# Patient Record
Sex: MALE | Employment: FULL TIME | URBAN - METROPOLITAN AREA
[De-identification: names, ages, dates, MRNs, and addresses within clinical notes are randomized per-mention and may not be internally consistent; named-entity substitution may affect disease eponyms.]

---

## 2017-01-12 ENCOUNTER — HOSPITAL ENCOUNTER (OUTPATIENT)
Dept: LAB | Age: 52
Discharge: HOME | End: 2017-01-12
Payer: COMMERCIAL

## 2017-01-12 ENCOUNTER — OFFICE VISIT (OUTPATIENT)
Dept: FAMILY MEDICINE CLINIC | Age: 52
End: 2017-01-12

## 2017-01-12 VITALS
SYSTOLIC BLOOD PRESSURE: 110 MMHG | HEIGHT: 67 IN | RESPIRATION RATE: 16 BRPM | DIASTOLIC BLOOD PRESSURE: 70 MMHG | OXYGEN SATURATION: 95 % | TEMPERATURE: 98.2 F | WEIGHT: 202.4 LBS | BODY MASS INDEX: 31.77 KG/M2 | HEART RATE: 90 BPM

## 2017-01-12 DIAGNOSIS — E11.9 CONTROLLED TYPE 2 DIABETES MELLITUS WITHOUT COMPLICATION, WITHOUT LONG-TERM CURRENT USE OF INSULIN (HCC): Primary | ICD-10-CM

## 2017-01-12 DIAGNOSIS — Z11.59 NEED FOR HEPATITIS C SCREENING TEST: ICD-10-CM

## 2017-01-12 DIAGNOSIS — I10 ESSENTIAL HYPERTENSION WITH GOAL BLOOD PRESSURE LESS THAN 130/80: ICD-10-CM

## 2017-01-12 DIAGNOSIS — Z13.0 SCREENING, ANEMIA, DEFICIENCY, IRON: ICD-10-CM

## 2017-01-12 DIAGNOSIS — Z13.21 ENCOUNTER FOR VITAMIN DEFICIENCY SCREENING: ICD-10-CM

## 2017-01-12 DIAGNOSIS — E55.9 VITAMIN D DEFICIENCY: ICD-10-CM

## 2017-01-12 DIAGNOSIS — Z23 ENCOUNTER FOR IMMUNIZATION: ICD-10-CM

## 2017-01-12 DIAGNOSIS — Z13.29 SCREENING FOR THYROID DISORDER: ICD-10-CM

## 2017-01-12 DIAGNOSIS — E78.00 HYPERCHOLESTEREMIA: ICD-10-CM

## 2017-01-12 DIAGNOSIS — E11.9 CONTROLLED TYPE 2 DIABETES MELLITUS WITHOUT COMPLICATION, WITHOUT LONG-TERM CURRENT USE OF INSULIN (HCC): ICD-10-CM

## 2017-01-12 DIAGNOSIS — E66.9 OBESITY (BMI 30.0-34.9): ICD-10-CM

## 2017-01-12 LAB
ALT SERPL-CCNC: 50 U/L (ref 16–61)
ANION GAP BLD CALC-SCNC: 9 MMOL/L (ref 3–18)
AST SERPL W P-5'-P-CCNC: 18 U/L (ref 15–37)
BUN SERPL-MCNC: 15 MG/DL (ref 7–18)
BUN/CREAT SERPL: 14 (ref 12–20)
CALCIUM SERPL-MCNC: 9.1 MG/DL (ref 8.5–10.1)
CHLORIDE SERPL-SCNC: 101 MMOL/L (ref 100–108)
CHOLEST SERPL-MCNC: 166 MG/DL
CO2 SERPL-SCNC: 28 MMOL/L (ref 21–32)
CREAT SERPL-MCNC: 1.07 MG/DL (ref 0.6–1.3)
EST. AVERAGE GLUCOSE BLD GHB EST-MCNC: 140 MG/DL
GLUCOSE SERPL-MCNC: 119 MG/DL (ref 74–99)
HBA1C MFR BLD: 6.5 % (ref 4.2–5.6)
HDLC SERPL-MCNC: 40 MG/DL (ref 40–60)
HDLC SERPL: 4.2 {RATIO} (ref 0–5)
LDLC SERPL CALC-MCNC: 96.8 MG/DL (ref 0–100)
LIPID PROFILE,FLP: NORMAL
POTASSIUM SERPL-SCNC: 4.1 MMOL/L (ref 3.5–5.5)
SODIUM SERPL-SCNC: 138 MMOL/L (ref 136–145)
TRIGL SERPL-MCNC: 146 MG/DL (ref ?–150)
VLDLC SERPL CALC-MCNC: 29.2 MG/DL

## 2017-01-12 PROCEDURE — 80061 LIPID PANEL: CPT | Performed by: FAMILY MEDICINE

## 2017-01-12 PROCEDURE — 84450 TRANSFERASE (AST) (SGOT): CPT | Performed by: FAMILY MEDICINE

## 2017-01-12 PROCEDURE — 84460 ALANINE AMINO (ALT) (SGPT): CPT | Performed by: FAMILY MEDICINE

## 2017-01-12 PROCEDURE — 83036 HEMOGLOBIN GLYCOSYLATED A1C: CPT | Performed by: FAMILY MEDICINE

## 2017-01-12 PROCEDURE — 80048 BASIC METABOLIC PNL TOTAL CA: CPT | Performed by: FAMILY MEDICINE

## 2017-01-12 PROCEDURE — 36415 COLL VENOUS BLD VENIPUNCTURE: CPT | Performed by: FAMILY MEDICINE

## 2017-01-12 PROCEDURE — 82306 VITAMIN D 25 HYDROXY: CPT | Performed by: FAMILY MEDICINE

## 2017-01-12 PROCEDURE — 86803 HEPATITIS C AB TEST: CPT | Performed by: FAMILY MEDICINE

## 2017-01-12 RX ORDER — SIMVASTATIN 40 MG/1
TABLET, FILM COATED ORAL
Qty: 90 TAB | Refills: 1 | Status: SHIPPED | OUTPATIENT
Start: 2017-01-12 | End: 2017-07-27 | Stop reason: ALTCHOICE

## 2017-01-12 RX ORDER — METFORMIN HYDROCHLORIDE 500 MG/1
TABLET ORAL
Qty: 90 TAB | Refills: 1 | Status: SHIPPED | OUTPATIENT
Start: 2017-01-12 | End: 2018-07-01 | Stop reason: SDUPTHER

## 2017-01-12 NOTE — PROGRESS NOTES
Nkechi Steele is a 45 y.o. male here for follow up      1. Have you been to the ER, urgent care clinic or hospitalized since your last visit? NO.     2. Have you seen or consulted any other health care providers outside of the [facility] since your last visit (Include any pap smears or colon screening)? NO      Do you have an Advanced Directive? NO    Would you like information on Advanced Directives?  NO

## 2017-01-12 NOTE — PATIENT INSTRUCTIONS
Continue current medications  Further disposition pending lab results if indicated. Return for annual PE and follow up in 6 months, sooner with any problems. Return for fasting lab a few days before the appointment.

## 2017-01-12 NOTE — PROGRESS NOTES
HISTORY OF PRESENT ILLNESS  Kai Kamara is a 45 y.o. male. Diabetes   The history is provided by the patient and medical records. This is a chronic problem. Pertinent negatives include no chest pain, no abdominal pain, no headaches and no shortness of breath. Patient Active Problem List   Diagnosis Code    Sleep apnea G47.30    Obesity (BMI 30.0-34. 9) E66.9    Family history of colon cancer Z80.0    Hypercholesteremia E78.00    Essential hypertension with goal blood pressure less than 130/80 I10    Vitamin D deficiency E55.9    Controlled type 2 diabetes mellitus without complication, without long-term current use of insulin (HCC) E11.9       Current Outpatient Prescriptions:     metFORMIN (GLUCOPHAGE) 500 mg tablet, TAKE 1 TABLET BY MOUTH EVERY DAY WITH SUPPER, Disp: 30 Tab, Rfl: 0    simvastatin (ZOCOR) 40 mg tablet, TAKE 1 TABLET BY MOUTH EVERY NIGHT AT BEDTIME, Disp: 30 Tab, Rfl: 0    ergocalciferol (VITAMIN D2) 50,000 unit capsule, Take 1 Cap by mouth every seven (7) days. , Disp: 12 Cap, Rfl: 3    losartan-hydrochlorothiazide (HYZAAR) 100-12.5 mg per tablet, Take 1 Tab by mouth daily. , Disp: 90 Tab, Rfl: 3    clobetasol 0.05 % lotion, Apply  to affected area two (2) times a day., Disp: , Rfl:     nystatin-triamcinolone (MYCOLOG II) topical cream, Apply  to affected area two (2) times a day., Disp: , Rfl:     Review of Systems   Constitutional: Negative for chills, fever and weight loss. Respiratory: Negative for shortness of breath. Cardiovascular: Negative for chest pain and palpitations. Gastrointestinal: Negative for abdominal pain, diarrhea, nausea and vomiting. Genitourinary: Negative for frequency. Musculoskeletal: Positive for back pain (chronic , Hx herniated disc). Skin: Negative for rash. Neurological: Negative for dizziness, tingling and headaches. Endo/Heme/Allergies: Negative for polydipsia.      Visit Vitals    /70 (BP 1 Location: Left arm, BP Patient Position: Sitting)    Pulse 90    Temp 98.2 °F (36.8 °C) (Oral)    Resp 16    Ht 5' 7\" (1.702 m)    Wt 202 lb 6.4 oz (91.8 kg)    SpO2 95%    BMI 31.7 kg/m2     Physical Exam   Constitutional: He is oriented to person, place, and time. He appears well-developed and well-nourished. HENT:   Head: Normocephalic. Eyes: EOM are normal.   Neck: Neck supple. Cardiovascular: Normal rate, regular rhythm and normal heart sounds. Pulmonary/Chest: Effort normal and breath sounds normal.   Musculoskeletal: He exhibits no edema. Neurological: He is alert and oriented to person, place, and time. Skin: Skin is warm and dry. Psychiatric: He has a normal mood and affect. His behavior is normal.   Nursing note and vitals reviewed. ASSESSMENT and PLAN    ICD-10-CM ICD-9-CM    1. Controlled type 2 diabetes mellitus without complication, without long-term current use of insulin (HCC) E11.9 250.00 HEMOGLOBIN A1C WITH EAG      HEMOGLOBIN A1C WITH EAG      MICROALBUMIN, UR, RAND W/ MICROALBUMIN/CREA RATIO   2. Essential hypertension with goal blood pressure less than 130/80 R97 325.1 METABOLIC PANEL, BASIC      METABOLIC PANEL, COMPREHENSIVE      URINALYSIS W/ RFLX MICROSCOPIC   3. Hypercholesteremia E78.00 272.0 LIPID PANEL      ALT      AST      LIPID PANEL   4. Vitamin D deficiency E55.9 268.9 VITAMIN D, 25 HYDROXY      VITAMIN D, 25 HYDROXY   5. Obesity (BMI 30.0-34. 9) E66.9 278.00    6. Need for hepatitis C screening test Z11.59 V73.89 HEPATITIS C AB   7. Encounter for immunization Z23 V03.89 INFLUENZA VIRUS VAC QUAD,SPLIT,PRESV FREE SYRINGE 3/> YRS IM   8. Screening, anemia, deficiency, iron Z13.0 V78.0 CBC WITH AUTOMATED DIFF   9. Screening for thyroid disorder Z13.29 V77.0 TSH 3RD GENERATION   10. Encounter for vitamin deficiency screening Z13.21 V77.99    Continue current medications  Further disposition pending lab results if indicated.   Return for annual PE and follow up in 6 months, sooner with any problems. Return for fasting lab a few days before the appointment.

## 2017-01-12 NOTE — MR AVS SNAPSHOT
Visit Information Date & Time Provider Department Dept. Phone Encounter #  
 1/12/2017  8:30 AM Memphis Shabbir, 3 [facility] [phone] [id number] Upcoming Health Maintenance Date Due Hepatitis C Screening 1965 Pneumococcal 19-64 Medium Risk (1 of 1 - PPSV23) 10/30/1983 DTaP/Tdap/Td series (1 - Tdap) 10/30/1985 INFLUENZA AGE 9 TO ADULT 8/1/2016 HEMOGLOBIN A1C Q6M 11/17/2016 FOOT EXAM Q1 5/17/2017 MICROALBUMIN Q1 5/17/2017 LIPID PANEL Q1 5/17/2017 EYE EXAM RETINAL OR DILATED Q1 6/28/2017 COLONOSCOPY 3/23/2022 Allergies as of 1/12/2017  Review Complete On: 1/12/2017 By: Di Shea MD  
  
 Severity Noted Reaction Type Reactions Penicillins    Unable to Obtain Current Immunizations  Never Reviewed Name Date Influenza Vaccine (Quad) PF  Incomplete Not reviewed this visit You Were Diagnosed With   
  
 Codes Comments Controlled type 2 diabetes mellitus without complication, without long-term current use of insulin ([facility]    -  Primary ICD-10-CM: E11.9 ICD-9-CM: 250.00 Essential hypertension with goal blood pressure less than 130/80     ICD-10-CM: I10 
ICD-9-CM: 401.9 Hypercholesteremia     ICD-10-CM: E78.00 ICD-9-CM: 272.0 Vitamin D deficiency     ICD-10-CM: E55.9 ICD-9-CM: 268.9 Obesity (BMI 30.0-34.9)     ICD-10-CM: Y74.7 ICD-9-CM: 278.00 Need for hepatitis C screening test     ICD-10-CM: Z11.59 
ICD-9-CM: V73.89 Encounter for immunization     ICD-10-CM: F08 ICD-9-CM: V03.89 Vitals BP Pulse Temp Resp Height(growth percentile) Weight(growth percentile) 110/70 (BP 1 Location: Left arm, BP Patient Position: Sitting) 90 98.2 °F (36.8 °C) (Oral) 16 5' 7\" (1.702 m) 202 lb 6.4 oz (91.8 kg) SpO2 BMI Smoking Status 95% 31.7 kg/m2 Former Smoker BMI and BSA Data Body Mass Index Body Surface Area 31.7 kg/m 2 2.08 m 2 Preferred Pharmacy Pharmacy Name Phone [address] [place] [address], [address] [place] [address] Ave  [facility] [phone] Your Updated Medication List  
  
   
This list is accurate as of: 1/12/17  9:10 AM.  Always use your most recent med list.  
  
  
  
  
 clobetasol 0.05 % lotion Commonly known as:  Michaelene Raiford Apply  to affected area two (2) times a day. ergocalciferol 50,000 unit capsule Commonly known as:  VITAMIN D2 Take 1 Cap by mouth every seven (7) days. losartan-hydroCHLOROthiazide 100-12.5 mg per tablet Commonly known as:  HYZAAR Take 1 Tab by mouth daily. metFORMIN 500 mg tablet Commonly known as:  GLUCOPHAGE  
TAKE 1 TABLET BY MOUTH EVERY DAY WITH SUPPER  
  
 nystatin-triamcinolone topical cream  
Commonly known as:  MYCOLOG II Apply  to affected area two (2) times a day. simvastatin 40 mg tablet Commonly known as:  ZOCOR  
TAKE 1 TABLET BY MOUTH EVERY NIGHT AT BEDTIME Prescriptions Sent to Pharmacy Refills  
 metFORMIN (GLUCOPHAGE) 500 mg tablet 1 Sig: TAKE 1 TABLET BY MOUTH EVERY DAY WITH SUPPER Class: Normal  
 Pharmacy: [facility] [address] [facility] [address] AT [address] & [facility] Ph #: [phone]  
 simvastatin (ZOCOR) 40 mg tablet 1 Sig: TAKE 1 TABLET BY MOUTH EVERY NIGHT AT BEDTIME Class: Normal  
 Pharmacy: [facility] Delia Said, [address] [place] [address] [address] Ph #: [phone] We Performed the Following INFLUENZA VIRUS VAC QUAD,SPLIT,PRESV FREE SYRINGE 3/> YRS IM [id number] CPT(R)] To-Do List   
 01/12/2017 Lab:  ALT   
  
 01/12/2017 Lab:  AST   
  
 01/12/2017 Lab:  HEMOGLOBIN A1C WITH EAG   
  
 01/12/2017 Lab:  HEPATITIS C AB   
  
 01/12/2017 Lab:  LIPID PANEL   
  
 01/12/2017 Lab:  METABOLIC PANEL, BASIC   
  
 01/12/2017 Lab: VITAMIN D, 25 HYDROXY Patient Instructions Continue current medications Further disposition pending lab results if indicated. Return for annual PE and follow up in 6 months, sooner with any problems. Return for fasting lab a few days before the appointment. Introducing hospitals & [facility] SERVICES! Saul Hidalgo introduces [employer] patient portal. Now you can access parts of your medical record, email your doctor's office, and request medication refills online. 1. In your internet browser, go to [email or web address] 2. Click on the First Time User? Click Here link in the Sign In box. You will see the New Member Sign Up page. 3. Enter your [employer] Access Code exactly as it appears below. You will not need to use this code after youve completed the sign-up process. If you do not sign up before the expiration date, you must request a new code. · [employer] Access Code: [email or web address] Expires: 4/12/2017  8:59 AM 
 
4. Enter the last four digits of your Social Security Number (xxxx) and Date of Birth (mm/dd/yyyy) as indicated and click Submit. You will be taken to the next sign-up page. 5. Create a [employer] ID. This will be your [employer] login ID and cannot be changed, so think of one that is secure and easy to remember. 6. Create a [employer] password. You can change your password at any time. 7. Enter your Password Reset Question and Answer. This can be used at a later time if you forget your password. 8. Enter your e-mail address. You will receive e-mail notification when new information is available in [address] 9. Click Sign Up. You can now view and download portions of your medical record. 10. Click the Download Summary menu link to download a portable copy of your medical information. If you have questions, please visit the Frequently Asked Questions section of the [employer] website.  Remember, [employer] is NOT to be used for urgent needs. For medical emergencies, dial 911. Now available from your iPhone and Android! Please provide this summary of care documentation to your next provider. Your primary care clinician is listed as Yoselin Riley. If you have any questions after today's visit, please call [phone].

## 2017-01-13 LAB
25(OH)D3 SERPL-MCNC: 9.2 NG/ML (ref 30–100)
HCV AB SER IA-ACNC: <0.02 INDEX
HCV AB SERPL QL IA: NEGATIVE
HCV COMMENT,HCGAC: NORMAL

## 2017-01-16 DIAGNOSIS — E55.9 VITAMIN D DEFICIENCY: Primary | ICD-10-CM

## 2017-01-16 RX ORDER — ERGOCALCIFEROL 1.25 MG/1
50000 CAPSULE ORAL
Qty: 12 CAP | Refills: 3 | Status: SHIPPED | OUTPATIENT
Start: 2017-01-16 | End: 2017-07-27 | Stop reason: SDUPTHER

## 2017-01-16 NOTE — PROGRESS NOTES
[facility] Otolaryngol[facility] Head/nck Ulysses Loera      Doctor · Scott Mijares    Pt's wife aware of results and recommendations

## 2017-01-16 NOTE — PROGRESS NOTES
Please advise Mr. Aparicio Standing that lab results show diabetes in good control with a HgbA1c of 6.5%. Lipid profile shows the total cholesterol is 166 with normal <200. LDLc (bad cholesterol) is 96.8 with normal <70 for patients with diabetes. HDLc (good cholesterol) is 40 with normal>40. Serum triglycerides level is 146 with normal <150. It would be good to get the LDL cholesterol lower by avoiding dietary starch and sugar and following a program of regular aerobic exercise. If it is not better in 6 months we should consider changing the cholesterol lowering medication (simvastatin). the Vitamin D level was quite low. I recommend he resume Vitamin D2, 50,000 international units weekly, to be continued indefinitely pending future Vitamin D levels. The prescription(s) has been sent to the patient's pharmacy. I also recommend starting OTC Vitamin D3, 2,000 international units daily.

## 2017-07-16 DIAGNOSIS — I10 ESSENTIAL HYPERTENSION WITH GOAL BLOOD PRESSURE LESS THAN 130/80: ICD-10-CM

## 2017-07-17 RX ORDER — LOSARTAN POTASSIUM AND HYDROCHLOROTHIAZIDE 12.5; 1 MG/1; MG/1
TABLET ORAL
Qty: 90 TAB | Refills: 0 | Status: SHIPPED | OUTPATIENT
Start: 2017-07-17 | End: 2018-07-01 | Stop reason: SDUPTHER

## 2017-07-20 ENCOUNTER — HOSPITAL ENCOUNTER (OUTPATIENT)
Dept: LAB | Age: 52
Discharge: HOME | End: 2017-07-20
Payer: COMMERCIAL

## 2017-07-20 DIAGNOSIS — E78.00 HYPERCHOLESTEREMIA: ICD-10-CM

## 2017-07-20 DIAGNOSIS — I10 ESSENTIAL HYPERTENSION WITH GOAL BLOOD PRESSURE LESS THAN 130/80: ICD-10-CM

## 2017-07-20 DIAGNOSIS — Z13.0 SCREENING, ANEMIA, DEFICIENCY, IRON: ICD-10-CM

## 2017-07-20 DIAGNOSIS — Z13.29 SCREENING FOR THYROID DISORDER: ICD-10-CM

## 2017-07-20 DIAGNOSIS — E55.9 VITAMIN D DEFICIENCY: ICD-10-CM

## 2017-07-20 DIAGNOSIS — E11.9 CONTROLLED TYPE 2 DIABETES MELLITUS WITHOUT COMPLICATION, WITHOUT LONG-TERM CURRENT USE OF INSULIN (HCC): ICD-10-CM

## 2017-07-20 LAB
25(OH)D3 SERPL-MCNC: 12.6 NG/ML (ref 30–100)
ALBUMIN SERPL BCP-MCNC: 4.1 G/DL (ref 3.4–5)
ALBUMIN/GLOB SERPL: 1.3 {RATIO} (ref 0.8–1.7)
ALP SERPL-CCNC: 58 U/L (ref 45–117)
ALT SERPL-CCNC: 60 U/L (ref 16–61)
ANION GAP BLD CALC-SCNC: 10 MMOL/L (ref 3–18)
APPEARANCE UR: CLEAR
AST SERPL W P-5'-P-CCNC: 22 U/L (ref 15–37)
BACTERIA URNS QL MICRO: NEGATIVE /HPF
BASOPHILS # BLD AUTO: 0 K/UL (ref 0–0.06)
BASOPHILS # BLD: 1 % (ref 0–2)
BILIRUB SERPL-MCNC: 0.5 MG/DL (ref 0.2–1)
BILIRUB UR QL: NEGATIVE
BUN SERPL-MCNC: 16 MG/DL (ref 7–18)
BUN/CREAT SERPL: 14 (ref 12–20)
CALCIUM SERPL-MCNC: 9.2 MG/DL (ref 8.5–10.1)
CHLORIDE SERPL-SCNC: 103 MMOL/L (ref 100–108)
CHOLEST SERPL-MCNC: 198 MG/DL
CO2 SERPL-SCNC: 27 MMOL/L (ref 21–32)
COLOR UR: YELLOW
CREAT SERPL-MCNC: 1.18 MG/DL (ref 0.6–1.3)
CREAT UR-MCNC: 418 MG/DL (ref 30–125)
DIFFERENTIAL METHOD BLD: ABNORMAL
EOSINOPHIL # BLD: 0.1 K/UL (ref 0–0.4)
EOSINOPHIL NFR BLD: 1 % (ref 0–5)
EPITH CASTS URNS QL MICRO: ABNORMAL /LPF (ref 0–5)
ERYTHROCYTE [DISTWIDTH] IN BLOOD BY AUTOMATED COUNT: 14.7 % (ref 11.6–14.5)
EST. AVERAGE GLUCOSE BLD GHB EST-MCNC: 140 MG/DL
GLOBULIN SER CALC-MCNC: 3.2 G/DL (ref 2–4)
GLUCOSE SERPL-MCNC: 126 MG/DL (ref 74–99)
GLUCOSE UR STRIP.AUTO-MCNC: NEGATIVE MG/DL
HBA1C MFR BLD: 6.5 % (ref 4.2–5.6)
HCT VFR BLD AUTO: 45.5 % (ref 36–48)
HDLC SERPL-MCNC: 40 MG/DL (ref 40–60)
HDLC SERPL: 5 {RATIO} (ref 0–5)
HGB BLD-MCNC: 14.4 G/DL (ref 13–16)
HGB UR QL STRIP: NEGATIVE
KETONES UR QL STRIP.AUTO: NEGATIVE MG/DL
LDLC SERPL CALC-MCNC: 123 MG/DL (ref 0–100)
LEUKOCYTE ESTERASE UR QL STRIP.AUTO: NEGATIVE
LIPID PROFILE,FLP: ABNORMAL
LYMPHOCYTES # BLD AUTO: 46 % (ref 21–52)
LYMPHOCYTES # BLD: 2.8 K/UL (ref 0.9–3.6)
MCH RBC QN AUTO: 27.1 PG (ref 24–34)
MCHC RBC AUTO-ENTMCNC: 31.6 G/DL (ref 31–37)
MCV RBC AUTO: 85.5 FL (ref 74–97)
MICROALBUMIN UR-MCNC: 2.3 MG/DL (ref 0–3)
MICROALBUMIN/CREAT UR-RTO: 6 MG/G (ref 0–30)
MONOCYTES # BLD: 0.5 K/UL (ref 0.05–1.2)
MONOCYTES NFR BLD AUTO: 8 % (ref 3–10)
MUCOUS THREADS URNS QL MICRO: ABNORMAL /LPF
NEUTS SEG # BLD: 2.6 K/UL (ref 1.8–8)
NEUTS SEG NFR BLD AUTO: 44 % (ref 40–73)
NITRITE UR QL STRIP.AUTO: NEGATIVE
PH UR STRIP: 5.5 [PH] (ref 5–8)
PLATELET # BLD AUTO: 236 K/UL (ref 135–420)
PMV BLD AUTO: 11.3 FL (ref 9.2–11.8)
POTASSIUM SERPL-SCNC: 4.3 MMOL/L (ref 3.5–5.5)
PROT SERPL-MCNC: 7.3 G/DL (ref 6.4–8.2)
PROT UR STRIP-MCNC: ABNORMAL MG/DL
RBC # BLD AUTO: 5.32 M/UL (ref 4.7–5.5)
RBC #/AREA URNS HPF: 0 /HPF (ref 0–5)
SODIUM SERPL-SCNC: 140 MMOL/L (ref 136–145)
SP GR UR REFRACTOMETRY: >1.03 (ref 1–1.03)
TRIGL SERPL-MCNC: 175 MG/DL (ref ?–150)
TSH SERPL DL<=0.05 MIU/L-ACNC: 0.94 UIU/ML (ref 0.36–3.74)
UROBILINOGEN UR QL STRIP.AUTO: 0.2 EU/DL (ref 0.2–1)
VLDLC SERPL CALC-MCNC: 35 MG/DL
WBC # BLD AUTO: 6 K/UL (ref 4.6–13.2)
WBC URNS QL MICRO: ABNORMAL /HPF (ref 0–4)

## 2017-07-20 PROCEDURE — 82306 VITAMIN D 25 HYDROXY: CPT | Performed by: FAMILY MEDICINE

## 2017-07-20 PROCEDURE — 80053 COMPREHEN METABOLIC PANEL: CPT | Performed by: FAMILY MEDICINE

## 2017-07-20 PROCEDURE — 80061 LIPID PANEL: CPT | Performed by: FAMILY MEDICINE

## 2017-07-20 PROCEDURE — 36415 COLL VENOUS BLD VENIPUNCTURE: CPT | Performed by: FAMILY MEDICINE

## 2017-07-20 PROCEDURE — 83036 HEMOGLOBIN GLYCOSYLATED A1C: CPT | Performed by: FAMILY MEDICINE

## 2017-07-20 PROCEDURE — 82043 UR ALBUMIN QUANTITATIVE: CPT | Performed by: FAMILY MEDICINE

## 2017-07-20 PROCEDURE — 81001 URINALYSIS AUTO W/SCOPE: CPT | Performed by: FAMILY MEDICINE

## 2017-07-20 PROCEDURE — 84443 ASSAY THYROID STIM HORMONE: CPT | Performed by: FAMILY MEDICINE

## 2017-07-20 PROCEDURE — 85025 COMPLETE CBC W/AUTO DIFF WBC: CPT | Performed by: FAMILY MEDICINE

## 2017-07-27 ENCOUNTER — OFFICE VISIT (OUTPATIENT)
Dept: FAMILY MEDICINE CLINIC | Age: 52
End: 2017-07-27

## 2017-07-27 VITALS
TEMPERATURE: 97.8 F | RESPIRATION RATE: 16 BRPM | HEART RATE: 96 BPM | BODY MASS INDEX: 30.71 KG/M2 | OXYGEN SATURATION: 97 % | HEIGHT: 68 IN | WEIGHT: 202.6 LBS | DIASTOLIC BLOOD PRESSURE: 88 MMHG | SYSTOLIC BLOOD PRESSURE: 124 MMHG

## 2017-07-27 DIAGNOSIS — E66.9 OBESITY (BMI 30.0-34.9): ICD-10-CM

## 2017-07-27 DIAGNOSIS — Z00.00 ROUTINE GENERAL MEDICAL EXAMINATION AT A HEALTH CARE FACILITY: Primary | ICD-10-CM

## 2017-07-27 DIAGNOSIS — E11.9 CONTROLLED TYPE 2 DIABETES MELLITUS WITHOUT COMPLICATION, WITHOUT LONG-TERM CURRENT USE OF INSULIN (HCC): ICD-10-CM

## 2017-07-27 DIAGNOSIS — I10 ESSENTIAL HYPERTENSION: ICD-10-CM

## 2017-07-27 DIAGNOSIS — E78.2 MIXED HYPERLIPIDEMIA: ICD-10-CM

## 2017-07-27 DIAGNOSIS — E55.9 VITAMIN D DEFICIENCY: ICD-10-CM

## 2017-07-27 PROBLEM — D12.6 ADENOMATOUS COLON POLYP: Status: ACTIVE | Noted: 2017-07-27

## 2017-07-27 RX ORDER — ERGOCALCIFEROL 1.25 MG/1
50000 CAPSULE ORAL
Qty: 12 CAP | Refills: 3 | Status: SHIPPED | OUTPATIENT
Start: 2017-07-27 | End: 2019-07-25 | Stop reason: SDUPTHER

## 2017-07-27 RX ORDER — ATORVASTATIN CALCIUM 40 MG/1
40 TABLET, FILM COATED ORAL DAILY
Qty: 90 TAB | Refills: 0 | Status: SHIPPED | OUTPATIENT
Start: 2017-07-27 | End: 2017-09-30 | Stop reason: DRUGHIGH

## 2017-07-27 NOTE — PROGRESS NOTES
HISTORY OF PRESENT ILLNESS  Rocio Fernandes is a 45 y.o. male. Physical   The history is provided by the patient and medical records. Pertinent negatives include no chest pain, no abdominal pain, no headaches and no shortness of breath. Hypertension    The history is provided by the patient and medical records. This is a chronic problem. Pertinent negatives include no chest pain, no palpitations, no blurred vision, no headaches, no dizziness, no shortness of breath, no nausea and no vomiting. Diabetes   The history is provided by the patient and medical records. This is a chronic problem. Pertinent negatives include no chest pain, no abdominal pain, no headaches and no shortness of breath. Cholesterol Problem   The history is provided by the patient and medical records. This is a chronic problem. Pertinent negatives include no chest pain, no abdominal pain, no headaches and no shortness of breath.      Past Medical History:   Diagnosis Date    Diabetes ([address] 2006    Hypercholesterolemia     Hypertension 2006    Sleep apnea      Past Surgical History:   Procedure Laterality Date    COLONOSCOPY N/A 3/31/2017    COLONOSCOPY w hot polypectomy performed by Marya Noyola MD at [address] [place] [address] HX HEMORRHOIDECTOMY  2/2014     Family History   Problem Relation Age of Onset    Cancer Sister      rectal    Cancer Maternal Uncle      colon    Diabetes Maternal Uncle     Cancer Maternal Grandfather      colon    Diabetes Maternal Grandfather     Cancer Paternal Grandfather      unknown type     Heart Disease Neg Hx     Hypertension Neg Hx      History   Smoking Status    Former Smoker   Smokeless Tobacco    Former User    Quit date: 3/17/2015     Comment: quit over a year ago      History   Alcohol Use    Yes     Comment: occasionally      Health Maintenance Review:  Immunizations,  Tetanus - 2014  Pneumococcal - 2016  Immunization History   Administered Date(s) Administered    Influenza Vaccine (Quad) PF 01/12/2017   Colonoscopy: 3/31/2017, tubular adenoma  Diabetic eye exam 7/17/2017    Annual CPE/Wellness Encounter Plan:  Anticipatory guidance and recommendations provided verbally and with printed information. Return for routine follow up in 1 year, sooner with any problems. Problems Encounter:  HPI - See PMH, ROS  Hx hypertension without chest pain, palpitations, SOB, headache, dizziness or edema  Type 2 diabetes without polydipsia, polyuria, blurred vision or peripheral neuropathy symptoms  Family Hx colon CA and pesonal Hx tubular adenoma with most recent colonoscopy 3/2017 with tubular adenoma excised     Patient Active Problem List   Diagnosis Code    Sleep apnea G47.30    Obesity (BMI 30.0-34. 9) E66.9    Family history of colon cancer Z80.0    Mixed hyperlipidemia E78.2    Essential hypertension I10    Vitamin D deficiency E55.9    Controlled type 2 diabetes mellitus without complication, without long-term current use of insulin (HCC) E11.9    Adenomatous colon polyp D12.6     Medications as of conclusion of today's encounter:    Current Outpatient Prescriptions:     ergocalciferol (VITAMIN D2) 50,000 unit capsule, Take 1 Cap by mouth every seven (7) days. , Disp: 12 Cap, Rfl: 3    atorvastatin (LIPITOR) 40 mg tablet, Take 1 Tab by mouth daily. , Disp: 90 Tab, Rfl: 0    losartan-hydroCHLOROthiazide (HYZAAR) 100-12.5 mg per tablet, TAKE 1 TABLET BY MOUTH DAILY, Disp: 90 Tab, Rfl: 0    metFORMIN (GLUCOPHAGE) 500 mg tablet, TAKE 1 TABLET BY MOUTH EVERY DAY WITH SUPPER, Disp: 90 Tab, Rfl: 1    clobetasol 0.05 % lotion, Apply  to affected area two (2) times a day., Disp: , Rfl:      Review of Systems   Constitutional: Negative for chills, fever and weight loss. Episodic hot flashes   HENT: Negative for hearing loss. Eyes: Negative for blurred vision and double vision. Wears corrective lenses   Respiratory: Negative for cough, shortness of breath and wheezing.     Cardiovascular: Negative for chest pain, palpitations and leg swelling. Gastrointestinal: Negative for abdominal pain, constipation, diarrhea, heartburn, nausea and vomiting. Genitourinary: Negative for dysuria, frequency and urgency. Musculoskeletal: Positive for back pain (chronic). Negative for joint pain and myalgias. Skin: Negative for itching and rash. Neurological: Negative for dizziness, tingling, sensory change, focal weakness and headaches. Endo/Heme/Allergies: Negative for environmental allergies. Does not bruise/bleed easily. Psychiatric/Behavioral: Negative for depression. The patient is not nervous/anxious and does not have insomnia. Visit Vitals    /88 (BP 1 Location: Left arm, BP Patient Position: Sitting)    Pulse 96    Temp 97.8 °F (36.6 °C) (Oral)    Resp 16    Ht 5' 8\" (1.727 m)    Wt 202 lb 9.6 oz (91.9 kg)    SpO2 97%    BMI 30.81 kg/m2     Physical Exam   Constitutional: He is oriented to person, place, and time. He appears well-developed and well-nourished. HENT:   Head: Normocephalic. Right Ear: Tympanic membrane and ear canal normal.   Left Ear: Tympanic membrane and ear canal normal.   Mouth/Throat: Oropharynx is clear and moist.   Eyes: Conjunctivae and EOM are normal. Pupils are equal, round, and reactive to light. Neck: Neck supple. Cardiovascular: Normal rate, regular rhythm, normal heart sounds and intact distal pulses. Pulmonary/Chest: Effort normal and breath sounds normal.   Abdominal: Soft. Bowel sounds are normal. There is no tenderness. Musculoskeletal: He exhibits no edema. Neurological: He is alert and oriented to person, place, and time. He has normal reflexes. Rhomberg negative, no focal neurological deficits noted   Skin: Skin is warm and dry. Psychiatric: He has a normal mood and affect. His behavior is normal.   Nursing note and vitals reviewed.          Diabetic foot exam performed by Haydee Soria MD     Measurement  Response Nurse Comment Physician Comment   Monofilament  R - 6/6  L - 6/6     Pulse DP R - present  L - present     Pulse TP R - present  L - present     Structural deformity R - None  L - None     Skin Integrity / Deformity R - None  L - None             Results for Mica Patrick (MRN [id number]) as of 7/27/2017 10:15   Ref. Range 2/20/2014 18:58 5/17/2016 10:50 1/12/2017 09:22 3/31/2017 07:59 7/20/2017 08:44   WBC Latest Ref Range: 4.6 - 13.2 K/uL  5.0   6.0   RBC Latest Ref Range: 4.70 - 5.50 M/uL  4.90   5.32   HGB Latest Ref Range: 13.0 - 16.0 g/dL  13.3   14.4   HCT Latest Ref Range: 36.0 - 48.0 %  41.3   45.5   MCV Latest Ref Range: 74.0 - 97.0 FL  84.3   85.5   MCH Latest Ref Range: 24.0 - 34.0 PG  27.1   27.1   MCHC Latest Ref Range: 31.0 - 37.0 g/dL  32.2   31.6   RDW Latest Ref Range: 11.6 - 14.5 %  14.5   14.7 (H)   PLATELET Latest Ref Range: 135 - 420 K/uL  218   236   MPV Latest Ref Range: 9.2 - 11.8 FL  11.4   11.3   NEUTROPHILS Latest Ref Range: 40 - 73 %  44   44   LYMPHOCYTES Latest Ref Range: 21 - 52 %  48   46   MONOCYTES Latest Ref Range: 3 - 10 %  7   8   EOSINOPHILS Latest Ref Range: 0 - 5 %  1   1   BASOPHILS Latest Ref Range: 0 - 2 %  0   1   DF Latest Units:    AUTOMATED   AUTOMATED   ABS. NEUTROPHILS Latest Ref Range: 1.8 - 8.0 K/UL  2.2   2.6   ABS. LYMPHOCYTES Latest Ref Range: 0.9 - 3.6 K/UL  2.4   2.8   ABS. MONOCYTES Latest Ref Range: 0.05 - 1.2 K/UL  0.3   0.5   ABS. EOSINOPHILS Latest Ref Range: 0.0 - 0.4 K/UL  0.1   0.1   ABS.  BASOPHILS Latest Ref Range: 0.0 - 0.06 K/UL  0.0   0.0   Color Latest Units:    YELLOW   YELLOW   Appearance Latest Units:    CLEAR   CLEAR   Specific gravity Latest Ref Range: 1.005 - 1.030   1.027   >1.030 (H)   pH (UA) Latest Ref Range: 5.0 - 8.0    6.0   5.5   Protein Latest Ref Range: NEG mg/dL  TRACE (A)   TRACE (A)   Glucose Latest Ref Range: NEG mg/dL  NEGATIVE   NEGATIVE   Ketone Latest Ref Range: NEG mg/dL  NEGATIVE   NEGATIVE   Blood Latest Ref Range: NEG NEGATIVE   NEGATIVE   Bilirubin Latest Ref Range: NEG    NEGATIVE   NEGATIVE   Urobilinogen Latest Ref Range: 0.2 - 1.0 EU/dL  0.2   0.2   Nitrites Latest Ref Range: NEG    NEGATIVE   NEGATIVE   Leukocyte Esterase Latest Ref Range: NEG    NEGATIVE   NEGATIVE   Epithelial cells Latest Ref Range: 0 - 5 /lpf  FEW   FEW   Mucus Latest Ref Range: NEG /lpf     3+ (A)   WBC Latest Ref Range: 0 - 4 /hpf  0 to 1   0 to 1   RBC Latest Ref Range: 0 - 5 /hpf  0   0   Bacteria Latest Ref Range: NEG /hpf  NEGATIVE   NEGATIVE   Sodium Latest Ref Range: 136 - 145 mmol/L  140 138  140   Potassium Latest Ref Range: 3.5 - 5.5 mmol/L  4.3 4.1  4.3   Chloride Latest Ref Range: 100 - 108 mmol/L  104 101  103   CO2 Latest Ref Range: 21 - 32 mmol/L  29 28  27   Anion gap Latest Ref Range: 3.0 - 18 mmol/L  7 9  10   Glucose Latest Ref Range: 74 - 99 mg/dL  111 (H) 119 (H)  126 (H)   BUN Latest Ref Range: 7.0 - 18 MG/DL  12 15  16   Creatinine Latest Ref Range: 0.6 - 1.3 MG/DL  1.03 1.07  1.18   BUN/Creatinine ratio Latest Ref Range: 12 - 20    12 14  14   Calcium Latest Ref Range: 8.5 - 10.1 MG/DL  9.1 9.1  9.2   GFR est non-AA Latest Ref Range: >60 ml/min/1.73m2  >60 >60  >60   GFR est AA Latest Ref Range: >60 ml/min/1.73m2  >60 >60  >60   Bilirubin, total Latest Ref Range: 0.2 - 1.0 MG/DL  0.5   0.5   Protein, total Latest Ref Range: 6.4 - 8.2 g/dL  7.1   7.3   Albumin Latest Ref Range: 3.4 - 5.0 g/dL  4.2   4.1   Globulin Latest Ref Range: 2.0 - 4.0 g/dL  2.9   3.2   A-G Ratio Latest Ref Range: 0.8 - 1.7    1.4   1.3   ALT (SGPT) Latest Ref Range: 16 - 61 U/L  46 50  60   AST Latest Ref Range: 15 - 37 U/L  23 18  22   Alk.  phosphatase Latest Ref Range: 45 - 117 U/L  62   58   Triglyceride Latest Ref Range: <150 MG/DL  137 146  175 (H)   Cholesterol, total Latest Ref Range: <200 MG/DL  190 166  198   HDL Cholesterol Latest Ref Range: 40 - 60 MG/DL  40 40  40   CHOL/HDL Ratio Latest Ref Range: 0 - 5.0    4.8 4.2  5.0   LDL, calculated Latest Ref Range: 0 - 100 MG/DL  122.6 (H) 96.8  123 (H)   VLDL, calculated Latest Units: MG/DL  27.4 29.2  35   Hemoglobin A1c, (calculated) Latest Ref Range: 4.2 - 5.6 %  6.6 (H) 6.5 (H)  6.5 (H)   Est. average glucose Latest Units: mg/dL  143 140  140   Creatinine, urine Latest Ref Range: 30 - 125 mg/dL  268.75 (H)   418.00 (H)   Microalbumin,urine random Latest Ref Range: 0 - 3.0 MG/DL  1.90   2.30   Microalbumin/Creat. Ratio Latest Ref Range: 0 - 30 mg/g  7   6   TSH Latest Ref Range: 0.36 - 3.74 uIU/mL  0.77   0.94   Hepatitis C virus Ab Latest Ref Range: <0.80 Index   <0.02     Hep C  virus Ab Interp. Latest Ref Range: NEG     NEGATIVE     Hep C  virus Ab comment Latest Units:     Pend       7/20/2017  8:46 PM - Javi, Lab In [employer]       Component Results      Component Value Flag Ref Range Units Status     Vitamin D 25-Hydroxy 12.6 (L) 30 - 100 ng/mL Final         ASSESSMENT and PLAN    ICD-10-CM ICD-9-CM    1. Routine general medical examination at a health care facility Z00.00 V70.0    2. Controlled type 2 diabetes mellitus without complication, without long-term current use of insulin (HCC) E11.9 250.00 HM DIABETES FOOT EXAM      HEMOGLOBIN A1C WITH EAG   3. Essential hypertension A34 653.4 METABOLIC PANEL, BASIC   4. Mixed hyperlipidemia E78.2 272.2 atorvastatin (LIPITOR) 40 mg tablet      ALT      AST      LIPID PANEL      LIPID PANEL      ALT      AST   5. Vitamin D deficiency E55.9 268.9 ergocalciferol (VITAMIN D2) 50,000 unit capsule   6. Obesity (BMI 30.0-34. 9) E66.9 278.00 VITAMIN D, 25 HYDROXY   Hypertension well controlled  Type 2 Diabetes well controlled  Mixed hyperlipidemia levels not at goal on simvastatin 40 mg daily  Persistent Vitamin D deficiency, patient acknowledges non compliance with treatment  Obesity unimproved  Continue current medications except stop simvastatin and begin atorvastatin 40 mg daily  Resume ergocalciferol 50,000 units weekly and OTC Vitamin D3 2,000 units daily  Return for fasting lab only after the first 2 months on atorvastatin  Return for follow up in 6 months, sooner with any problems. Return for fasting lab a few days before the appointment. I have reviewed/discussed the above normal BMI with the patient. I have recommended the following interventions: monitor weight . Avoid dietary starch and sugar and follow a program of regular aerobic exercise.

## 2017-07-27 NOTE — MR AVS SNAPSHOT
Visit Information Date & Time Provider Department Dept. Phone Encounter #  
 7/27/2017  7:30 AM Everlena Ormond, 3 [facility] [phone] [id number] Upcoming Health Maintenance Date Due Pneumococcal 19-64 Medium Risk (1 of 1 - PPSV23) 10/30/1983 DTaP/Tdap/Td series (1 - Tdap) 10/30/1985 FOOT EXAM Q1 5/17/2017 EYE EXAM RETINAL OR DILATED Q1 6/28/2017 INFLUENZA AGE 9 TO ADULT 8/1/2017 HEMOGLOBIN A1C Q6M 1/20/2018 MICROALBUMIN Q1 7/20/2018 LIPID PANEL Q1 7/20/2018 COLONOSCOPY 3/31/2027 Allergies as of 7/27/2017  Review Complete On: 7/27/2017 By: Sulaiman Márquez LPN Severity Noted Reaction Type Reactions Penicillins    Rash Current Immunizations  Never Reviewed Name Date Influenza Vaccine (Quad) PF 1/12/2017 Not reviewed this visit You Were Diagnosed With   
  
 Codes Comments Routine general medical examination at a health care facility    -  Primary ICD-10-CM: Z00.00 ICD-9-CM: V70.0 Controlled type 2 diabetes mellitus without complication, without long-term current use of insulin ([facility] [address]     ICD-10-CM: E11.9 ICD-9-CM: 250.00 Essential hypertension     ICD-10-CM: I10 
ICD-9-CM: 401.9 Mixed hyperlipidemia     ICD-10-CM: E78.2 ICD-9-CM: 272.2 Vitamin D deficiency     ICD-10-CM: E55.9 ICD-9-CM: 268.9 Obesity (BMI 30.0-34.9)     ICD-10-CM: A10.0 ICD-9-CM: 278.00 Vitals BP Pulse Temp Resp Height(growth percentile) Weight(growth percentile) 124/88 (BP 1 Location: Left arm, BP Patient Position: Sitting) 96 97.8 °F (36.6 °C) (Oral) 16 5' 8\" (1.727 m) 202 lb 9.6 oz (91.9 kg) SpO2 BMI Smoking Status 97% 30.81 kg/m2 Former Smoker BMI and BSA Data Body Mass Index Body Surface Area  
 30.81 kg/m 2 2.1 m 2 Preferred Pharmacy Pharmacy Name Phone  [address], [address]  [facility],[address] [place] [address] [phone] Your Updated Medication List  
  
   
This list is accurate as of: 7/27/17  8:05 AM.  Always use your most recent med list.  
  
  
  
  
 atorvastatin 40 mg tablet Commonly known as:  LIPITOR Take 1 Tab by mouth daily. clobetasol 0.05 % lotion Commonly known as:  Franklin Boga Apply  to affected area two (2) times a day. ergocalciferol 50,000 unit capsule Commonly known as:  VITAMIN D2 Take 1 Cap by mouth every seven (7) days. losartan-hydroCHLOROthiazide 100-12.5 mg per tablet Commonly known as:  HYZAAR  
TAKE 1 TABLET BY MOUTH DAILY  
  
 metFORMIN 500 mg tablet Commonly known as:  GLUCOPHAGE  
TAKE 1 TABLET BY MOUTH EVERY DAY WITH SUPPER Prescriptions Sent to Pharmacy Refills  
 ergocalciferol (VITAMIN D2) 50,000 unit capsule 3 Sig: Take 1 Cap by mouth every seven (7) days. Class: Normal  
 Pharmacy: [facility] Luciano Lin [facility], [address] [place] [address] [address] Ph #: [phone] Route: Oral  
 atorvastatin (LIPITOR) 40 mg tablet 0 Sig: Take 1 Tab by mouth daily. Class: Normal  
 Pharmacy: [facility] Luciano Wong, [address] [place] [address] [address] Ph #: [phone] Route: Oral  
  
Patient Instructions Continue current medications except stop simvastatin and begin atorvastatin 40 mg daily Resume ergocalciferol 50,000 units weekly and OTC Vitamin D3 2,000 units daily Return for fasting lab only after the first 2 months on atorvastatin Return for follow up in 6 months, sooner with any problems. Return for fasting lab a few days before the appointment. Well Visit, Men 48 to 72: Care Instructions Your Care Instructions Physical exams can help you stay healthy. Your doctor has checked your overall health and may have suggested ways to take good care of yourself. He or she also may have recommended tests. At home, you can help prevent illness with healthy eating, regular exercise, and other steps. Follow-up care is a key part of your treatment and safety. Be sure to make and go to all appointments, and call your doctor if you are having problems. It's also a good idea to know your test results and keep a list of the medicines you take. How can you care for yourself at home? · Reach and stay at a healthy weight. This will lower your risk for many problems, such as obesity, diabetes, heart disease, and high blood pressure. · Get at least 30 minutes of exercise on most days of the week. Walking is a good choice. You also may want to do other activities, such as running, swimming, cycling, or playing tennis or team sports. · Do not smoke. Smoking can make health problems worse. If you need help quitting, talk to your doctor about stop-smoking programs and medicines. These can increase your chances of quitting for good. · Protect your skin from too much sun. When you're outdoors from 10 a.m. to 4 p.m., stay in the shade or cover up with clothing and a hat with a wide brim. Wear sunglasses that block UV rays. Even when it's cloudy, put broad-spectrum sunscreen (SPF 30 or higher) on any exposed skin. · See a dentist one or two times a year for checkups and to have your teeth cleaned. · Wear a seat belt in the car. · Limit alcohol to 2 drinks a day. Too much alcohol can cause health problems. Follow your doctor's advice about when to have certain tests. These tests can spot problems early. · Cholesterol. Your doctor will tell you how often to have this done based on your overall health and other things that can increase your risk for heart attack and stroke. · Blood pressure. Have your blood pressure checked during a routine doctor visit. Your doctor will tell you how often to check your blood pressure based on your age, your blood pressure results, and other factors. · Prostate exam. Talk to your doctor about whether you should have a blood test (called a PSA test) for prostate cancer. Experts disagree on whether men should have this test. Some experts recommend that you discuss the benefits and risks of the test with your doctor. · Diabetes. Ask your doctor whether you should have tests for diabetes. · Vision. Some experts recommend that you have yearly exams for glaucoma and other age-related eye problems starting at age 48. · Hearing. Tell your doctor if you notice any change in your hearing. You can have tests to find out how well you hear. · Colon cancer. You should begin tests for colon cancer at age 48. You may have one of several tests. Your doctor will tell you how often to have tests based on your age and risk. Risks include whether you already had a precancerous polyp removed from your colon or whether your parent, brother, sister, or child has had colon cancer. · Heart attack and stroke risk. At least every 4 to 6 years, you should have your risk for heart attack and stroke assessed. Your doctor uses factors such as your age, blood pressure, cholesterol, and whether you smoke or have diabetes to show what your risk for a heart attack or stroke is over the next 10 years. · Abdominal aortic aneurysm. Ask your doctor whether you should have a test to check for an aneurysm. You may need a test if you ever smoked or if your parent, brother, sister, or child has had an aneurysm. When should you call for help? Watch closely for changes in your health, and be sure to contact your doctor if you have any problems or symptoms that concern you. Where can you learn more? Go to http://elin-mark.info/. Enter R193 in the search box to learn more about \"Well Visit, Men 48 to 72: Care Instructions. \" Current as of: July 19, 2016 Content Version: 11.3 © 6830-0674 [employer].  Care instructions adapted under license by [address] (which disclaims liability or warranty for this information). If you have questions about a medical condition or this instruction, always ask your healthcare professional. [address] any warranty or liability for your use of this information. Introducing [facility]! Bret Dahl introduces Recordant patient portal. Now you can access parts of your medical record, email your doctor's office, and request medication refills online. 1. In your internet browser, go to [email or web address] 2. Click on the First Time User? Click Here link in the Sign In box. You will see the New Member Sign Up page. 3. Enter your Recordant Access Code exactly as it appears below. You will not need to use this code after youve completed the sign-up process. If you do not sign up before the expiration date, you must request a new code. · Recordant Access Code: [email or web address] Expires: 10/25/2017  8:05 AM 
 
4. Enter the last four digits of your Social Security Number (xxxx) and Date of Birth (mm/dd/yyyy) as indicated and click Submit. You will be taken to the next sign-up page. 5. Create a Recordant ID. This will be your Recordant login ID and cannot be changed, so think of one that is secure and easy to remember. 6. Create a Recordant password. You can change your password at any time. 7. Enter your Password Reset Question and Answer. This can be used at a later time if you forget your password. 8. Enter your e-mail address. You will receive e-mail notification when new information is available in [address] 9. Click Sign Up. You can now view and download portions of your medical record. 10. Click the Download Summary menu link to download a portable copy of your medical information. If you have questions, please visit the Frequently Asked Questions section of the Recordant website.  Remember, Recordant is NOT to be used for urgent needs. For medical emergencies, dial 911. Now available from your iPhone and Android! Please provide this summary of care documentation to your next provider. Your primary care clinician is listed as Baylee Swanson. If you have any questions after today's visit, please call [phone].

## 2017-07-27 NOTE — PROGRESS NOTES
Natali Aldana is a 45 y.o. male here for physical      1. Have you been to the ER, urgent care clinic or hospitalized since your last visit? NO.     2. Have you seen or consulted any other health care providers outside of the [address] [place] [address] since your last visit (Include any pap smears or colon screening)? NO      Do you have an Advanced Directive? NO    Would you like information on Advanced Directives?  NO

## 2017-07-27 NOTE — PATIENT INSTRUCTIONS
Continue current medications except stop simvastatin and begin atorvastatin 40 mg daily  Resume ergocalciferol 50,000 units weekly and OTC Vitamin D3 2,000 units daily  Return for fasting lab only after the first 2 months on atorvastatin  Return for follow up in 6 months, sooner with any problems. Return for fasting lab a few days before the appointment. Well Visit, Men 48 to 72: Care Instructions  Your Care Instructions  Physical exams can help you stay healthy. Your doctor has checked your overall health and may have suggested ways to take good care of yourself. He or she also may have recommended tests. At home, you can help prevent illness with healthy eating, regular exercise, and other steps. Follow-up care is a key part of your treatment and safety. Be sure to make and go to all appointments, and call your doctor if you are having problems. It's also a good idea to know your test results and keep a list of the medicines you take. How can you care for yourself at home? · Reach and stay at a healthy weight. This will lower your risk for many problems, such as obesity, diabetes, heart disease, and high blood pressure. · Get at least 30 minutes of exercise on most days of the week. Walking is a good choice. You also may want to do other activities, such as running, swimming, cycling, or playing tennis or team sports. · Do not smoke. Smoking can make health problems worse. If you need help quitting, talk to your doctor about stop-smoking programs and medicines. These can increase your chances of quitting for good. · Protect your skin from too much sun. When you're outdoors from 10 a.m. to 4 p.m., stay in the shade or cover up with clothing and a hat with a wide brim. Wear sunglasses that block UV rays. Even when it's cloudy, put broad-spectrum sunscreen (SPF 30 or higher) on any exposed skin. · See a dentist one or two times a year for checkups and to have your teeth cleaned.   · Wear a seat belt in the car. · Limit alcohol to 2 drinks a day. Too much alcohol can cause health problems. Follow your doctor's advice about when to have certain tests. These tests can spot problems early. · Cholesterol. Your doctor will tell you how often to have this done based on your overall health and other things that can increase your risk for heart attack and stroke. · Blood pressure. Have your blood pressure checked during a routine doctor visit. Your doctor will tell you how often to check your blood pressure based on your age, your blood pressure results, and other factors. · Prostate exam. Talk to your doctor about whether you should have a blood test (called a PSA test) for prostate cancer. Experts disagree on whether men should have this test. Some experts recommend that you discuss the benefits and risks of the test with your doctor. · Diabetes. Ask your doctor whether you should have tests for diabetes. · Vision. Some experts recommend that you have yearly exams for glaucoma and other age-related eye problems starting at age 48. · Hearing. Tell your doctor if you notice any change in your hearing. You can have tests to find out how well you hear. · Colon cancer. You should begin tests for colon cancer at age 48. You may have one of several tests. Your doctor will tell you how often to have tests based on your age and risk. Risks include whether you already had a precancerous polyp removed from your colon or whether your parent, brother, sister, or child has had colon cancer. · Heart attack and stroke risk. At least every 4 to 6 years, you should have your risk for heart attack and stroke assessed. Your doctor uses factors such as your age, blood pressure, cholesterol, and whether you smoke or have diabetes to show what your risk for a heart attack or stroke is over the next 10 years. · Abdominal aortic aneurysm. Ask your doctor whether you should have a test to check for an aneurysm.  You may need a test if you ever smoked or if your parent, brother, sister, or child has had an aneurysm. When should you call for help? Watch closely for changes in your health, and be sure to contact your doctor if you have any problems or symptoms that concern you. Where can you learn more? Go to http://elin-mark.info/. Enter A766 in the search box to learn more about \"Well Visit, Men 48 to 72: Care Instructions. \"  Current as of: July 19, 2016  Content Version: 11.3  © 8856-6279 [employer]. Care instructions adapted under license by [employer] (which disclaims liability or warranty for this information). If you have questions about a medical condition or this instruction, always ask your healthcare professional. [address] any warranty or liability for your use of this information.

## 2017-09-29 ENCOUNTER — HOSPITAL ENCOUNTER (OUTPATIENT)
Dept: LAB | Age: 52
Discharge: HOME | End: 2017-09-29
Payer: COMMERCIAL

## 2017-09-29 DIAGNOSIS — E78.2 MIXED HYPERLIPIDEMIA: ICD-10-CM

## 2017-09-29 DIAGNOSIS — I10 ESSENTIAL HYPERTENSION: ICD-10-CM

## 2017-09-29 DIAGNOSIS — E11.9 CONTROLLED TYPE 2 DIABETES MELLITUS WITHOUT COMPLICATION, WITHOUT LONG-TERM CURRENT USE OF INSULIN (HCC): ICD-10-CM

## 2017-09-29 DIAGNOSIS — E66.9 OBESITY (BMI 30.0-34.9): ICD-10-CM

## 2017-09-29 LAB
25(OH)D3 SERPL-MCNC: 44.9 NG/ML (ref 30–100)
ALT SERPL-CCNC: 52 U/L (ref 16–61)
ANION GAP SERPL CALC-SCNC: 7 MMOL/L (ref 3–18)
AST SERPL-CCNC: 21 U/L (ref 15–37)
BUN SERPL-MCNC: 16 MG/DL (ref 7–18)
BUN/CREAT SERPL: 16 (ref 12–20)
CALCIUM SERPL-MCNC: 8.5 MG/DL (ref 8.5–10.1)
CHLORIDE SERPL-SCNC: 106 MMOL/L (ref 100–108)
CHOLEST SERPL-MCNC: 150 MG/DL
CO2 SERPL-SCNC: 26 MMOL/L (ref 21–32)
CREAT SERPL-MCNC: 1.03 MG/DL (ref 0.6–1.3)
EST. AVERAGE GLUCOSE BLD GHB EST-MCNC: 143 MG/DL
GLUCOSE SERPL-MCNC: 119 MG/DL (ref 74–99)
HBA1C MFR BLD: 6.6 % (ref 4.2–5.6)
HDLC SERPL-MCNC: 40 MG/DL (ref 40–60)
HDLC SERPL: 3.8 {RATIO} (ref 0–5)
LDLC SERPL CALC-MCNC: 86.6 MG/DL (ref 0–100)
LIPID PROFILE,FLP: NORMAL
POTASSIUM SERPL-SCNC: 4.2 MMOL/L (ref 3.5–5.5)
SODIUM SERPL-SCNC: 139 MMOL/L (ref 136–145)
TRIGL SERPL-MCNC: 117 MG/DL (ref ?–150)
VLDLC SERPL CALC-MCNC: 23.4 MG/DL

## 2017-09-29 PROCEDURE — 83036 HEMOGLOBIN GLYCOSYLATED A1C: CPT | Performed by: FAMILY MEDICINE

## 2017-09-29 PROCEDURE — 82306 VITAMIN D 25 HYDROXY: CPT | Performed by: FAMILY MEDICINE

## 2017-09-29 PROCEDURE — 84450 TRANSFERASE (AST) (SGOT): CPT | Performed by: FAMILY MEDICINE

## 2017-09-29 PROCEDURE — 84460 ALANINE AMINO (ALT) (SGPT): CPT | Performed by: FAMILY MEDICINE

## 2017-09-29 PROCEDURE — 36415 COLL VENOUS BLD VENIPUNCTURE: CPT | Performed by: FAMILY MEDICINE

## 2017-09-29 PROCEDURE — 80061 LIPID PANEL: CPT | Performed by: FAMILY MEDICINE

## 2017-09-29 PROCEDURE — 80048 BASIC METABOLIC PNL TOTAL CA: CPT | Performed by: FAMILY MEDICINE

## 2017-09-29 NOTE — LETTER
10/4/2017 1:45 PM 
 
Mr. Mana Roblero 
[address] [place] [phone] Dear Mana Roblero: Please find your most recent results below. Please advise mr. Jackson Huertas that his lab results show improvement in his LDL cholesterol which is now 86.5 on atorvastatin 40 mg daily, down from 123 2 months ago. The goal for patients with diabetes is <70. I would like to increase his atorvastatin to 80 mg daily. He can use up any left over 40 mg tablets by taking two at once daily and I will send a prescription for 80 mg tablets to his pharmacy. His HgbA1c increased slightly from 6.5% to 6.6% which is still excellent control of diabetes. His Vitamin D level is now in the normal range with the Vitamin D supplements. Please advise him to continue all of his current medications including the Vitamin D supplements and higher dose of atorvastatin. Please ask him to schedule a follow up appointment in about 4 months and come by the office a few days before the appointment for fasting lab studies Resulted Orders ALT Result Value Ref Range ALT (SGPT) 52 16 - 61 U/L  
AST Result Value Ref Range AST (SGOT) 21 15 - 37 U/L  
LIPID PANEL Result Value Ref Range LIPID PROFILE Cholesterol, total 150 <200 MG/DL Triglyceride 117 <150 MG/DL Comment:  
   The drugs N-acetylcysteine (NAC) and 
Metamiszole have been found to cause falsely 
low results in this chemical assay. Please 
be sure to submit blood samples obtained BEFORE administration of either of these 
drugs to assure correct results. HDL Cholesterol 40 40 - 60 MG/DL  
 LDL, calculated 86.6 0 - 100 MG/DL VLDL, calculated 23.4 MG/DL  
 CHOL/HDL Ratio 3.8 0 - 5.0 HEMOGLOBIN A1C WITH EAG Result Value Ref Range Hemoglobin A1c 6.6 (H) 4.2 - 5.6 % Comment:  
   (NOTE) HbA1C Interpretive Ranges <5.7              Normal 
5.7 - 6.4         Consider Prediabetes >6.5              Consider Diabetes Est. average glucose 143 mg/dL Comment:  
   (NOTE) The eAG should be interpreted with patient characteristics in mind  
since ethnicity, interindividual differences, red cell lifespan,  
variation in rates of glycation, etc. may affect the validity of the  
calculation. METABOLIC PANEL, BASIC Result Value Ref Range Sodium 139 136 - 145 mmol/L Potassium 4.2 3.5 - 5.5 mmol/L Chloride 106 100 - 108 mmol/L  
 CO2 26 21 - 32 mmol/L Anion gap 7 3.0 - 18 mmol/L Glucose 119 (H) 74 - 99 mg/dL BUN 16 7.0 - 18 MG/DL Creatinine 1.03 0.6 - 1.3 MG/DL  
 BUN/Creatinine ratio 16 12 - 20 GFR est AA >60 >60 ml/min/1.73m2 GFR est non-AA >60 >60 ml/min/1.73m2 Comment:  
   (NOTE) Estimated GFR is calculated using the Modification of Diet in Renal  
Disease (MDRD) Study equation, reported for both  Americans [facility]) and non- Americans (GFRNA), and normalized to 1.73m2  
body surface area. The physician must decide which value applies to  
the patient. The MDRD study equation should only be used in  
individuals age 25 or older. It has not been validated for the  
following: pregnant women, patients with serious comorbid conditions,  
or on certain medications, or persons with extremes of body size,  
muscle mass, or nutritional status. Calcium 8.5 8.5 - 10.1 MG/DL  
VITAMIN D, 25 HYDROXY Result Value Ref Range Vitamin D 25-Hydroxy 44.9 30 - 100 ng/mL Comment:  
   (NOTE) Deficiency               <20 ng/mL Insufficiency          20-30 ng/mL Sufficient             ng/mL Possible toxicity       >100 ng/mL The Method used is [facility] currently standardized to a Center of Disease Control and Prevention (CDC) certified reference  
method. Samples containing fluorescein dye can produce falsely  
elevated values when tested with the ADVIA Centaur Vitamin D Assay.   
It is recommended that results in the toxic range, >100 ng/mL, be  
 retested 72 hours post fluorescein exposure. Please call me if you have any questions: [phone] Sincerely, [facility] OUTREACH INSURANCE

## 2017-09-30 DIAGNOSIS — E78.2 MIXED HYPERLIPIDEMIA: ICD-10-CM

## 2017-09-30 DIAGNOSIS — I10 ESSENTIAL HYPERTENSION: ICD-10-CM

## 2017-09-30 DIAGNOSIS — E55.9 VITAMIN D DEFICIENCY: ICD-10-CM

## 2017-09-30 DIAGNOSIS — E11.9 CONTROLLED TYPE 2 DIABETES MELLITUS WITHOUT COMPLICATION, WITHOUT LONG-TERM CURRENT USE OF INSULIN (HCC): Primary | ICD-10-CM

## 2017-09-30 RX ORDER — ATORVASTATIN CALCIUM 80 MG/1
80 TABLET, FILM COATED ORAL DAILY
Qty: 90 TAB | Refills: 2 | Status: SHIPPED | OUTPATIENT
Start: 2017-09-30 | End: 2019-01-14 | Stop reason: SDUPTHER

## 2017-09-30 NOTE — PROGRESS NOTES
Please advise mr. Polly Bucio that his lab results show improvement in his LDL cholesterol which is now 86.5 on atorvastatin 40 mg daily, down from 123 2 months ago. The goal for patients with diabetes is <70. I would like to increase his atorvastatin to 80 mg daily. He can use up any left over 40 mg tablets by taking two at once daily and I will send a prescription for 80 mg tablets to his pharmacy. His HgbA1c increased slightly from 6.5% to 6.6% which is still excellent control of diabetes. His Vitamin D level is now in the normal range with the Vitamin D supplements. Please advise him to continue all of his current medications including the Vitamin D supplements and higher dose of atorvastatin. Please ask him to schedule a follow up appointment in about 4 months and come by the office a few days before the appointment for fasting lab studies.

## 2018-07-01 DIAGNOSIS — I10 ESSENTIAL HYPERTENSION WITH GOAL BLOOD PRESSURE LESS THAN 130/80: ICD-10-CM

## 2018-07-02 DIAGNOSIS — E55.9 VITAMIN D DEFICIENCY: ICD-10-CM

## 2018-07-02 DIAGNOSIS — E78.2 MIXED HYPERLIPIDEMIA: ICD-10-CM

## 2018-07-02 DIAGNOSIS — E11.9 CONTROLLED TYPE 2 DIABETES MELLITUS WITHOUT COMPLICATION, WITHOUT LONG-TERM CURRENT USE OF INSULIN (HCC): Primary | ICD-10-CM

## 2018-07-02 DIAGNOSIS — I10 ESSENTIAL HYPERTENSION: ICD-10-CM

## 2018-07-02 RX ORDER — LOSARTAN POTASSIUM AND HYDROCHLOROTHIAZIDE 12.5; 1 MG/1; MG/1
TABLET ORAL
Qty: 90 TAB | Refills: 0 | Status: SHIPPED | OUTPATIENT
Start: 2018-07-02 | End: 2019-07-29 | Stop reason: SDUPTHER

## 2018-07-26 ENCOUNTER — HOSPITAL ENCOUNTER (OUTPATIENT)
Dept: LAB | Age: 53
Discharge: HOME | End: 2018-07-26
Payer: COMMERCIAL

## 2018-07-26 DIAGNOSIS — E78.2 MIXED HYPERLIPIDEMIA: ICD-10-CM

## 2018-07-26 DIAGNOSIS — I10 ESSENTIAL HYPERTENSION: ICD-10-CM

## 2018-07-26 DIAGNOSIS — E55.9 VITAMIN D DEFICIENCY: ICD-10-CM

## 2018-07-26 DIAGNOSIS — E11.9 CONTROLLED TYPE 2 DIABETES MELLITUS WITHOUT COMPLICATION, WITHOUT LONG-TERM CURRENT USE OF INSULIN (HCC): ICD-10-CM

## 2018-07-26 LAB
ALBUMIN SERPL-MCNC: 4.2 G/DL (ref 3.4–5)
ALBUMIN/GLOB SERPL: 1.3 {RATIO} (ref 0.8–1.7)
ALP SERPL-CCNC: 71 U/L (ref 45–117)
ALT SERPL-CCNC: 43 U/L (ref 16–61)
ANION GAP SERPL CALC-SCNC: 7 MMOL/L (ref 3–18)
AST SERPL-CCNC: 12 U/L (ref 15–37)
BILIRUB SERPL-MCNC: 0.5 MG/DL (ref 0.2–1)
BUN SERPL-MCNC: 15 MG/DL (ref 7–18)
BUN/CREAT SERPL: 13 (ref 12–20)
CALCIUM SERPL-MCNC: 9 MG/DL (ref 8.5–10.1)
CHLORIDE SERPL-SCNC: 103 MMOL/L (ref 100–108)
CHOLEST SERPL-MCNC: 112 MG/DL
CO2 SERPL-SCNC: 29 MMOL/L (ref 21–32)
CREAT SERPL-MCNC: 1.13 MG/DL (ref 0.6–1.3)
EST. AVERAGE GLUCOSE BLD GHB EST-MCNC: 148 MG/DL
GLOBULIN SER CALC-MCNC: 3.3 G/DL (ref 2–4)
GLUCOSE SERPL-MCNC: 138 MG/DL (ref 74–99)
HBA1C MFR BLD: 6.8 % (ref 4.2–5.6)
HDLC SERPL-MCNC: 32 MG/DL (ref 40–60)
HDLC SERPL: 3.5 {RATIO} (ref 0–5)
LDLC SERPL CALC-MCNC: 61 MG/DL (ref 0–100)
LIPID PROFILE,FLP: ABNORMAL
POTASSIUM SERPL-SCNC: 4.1 MMOL/L (ref 3.5–5.5)
PROT SERPL-MCNC: 7.5 G/DL (ref 6.4–8.2)
SODIUM SERPL-SCNC: 139 MMOL/L (ref 136–145)
TRIGL SERPL-MCNC: 95 MG/DL (ref ?–150)
VLDLC SERPL CALC-MCNC: 19 MG/DL

## 2018-07-26 PROCEDURE — 83036 HEMOGLOBIN GLYCOSYLATED A1C: CPT | Performed by: FAMILY MEDICINE

## 2018-07-26 PROCEDURE — 82306 VITAMIN D 25 HYDROXY: CPT | Performed by: FAMILY MEDICINE

## 2018-07-26 PROCEDURE — 80053 COMPREHEN METABOLIC PANEL: CPT | Performed by: FAMILY MEDICINE

## 2018-07-26 PROCEDURE — 82043 UR ALBUMIN QUANTITATIVE: CPT | Performed by: FAMILY MEDICINE

## 2018-07-26 PROCEDURE — 36415 COLL VENOUS BLD VENIPUNCTURE: CPT | Performed by: FAMILY MEDICINE

## 2018-07-26 PROCEDURE — 80061 LIPID PANEL: CPT | Performed by: FAMILY MEDICINE

## 2018-07-27 LAB
25(OH)D3 SERPL-MCNC: 23.7 NG/ML (ref 30–100)
CREAT UR-MCNC: 355.71 MG/DL (ref 30–125)
MICROALBUMIN UR-MCNC: 6.1 MG/DL (ref 0–3)
MICROALBUMIN/CREAT UR-RTO: 17 MG/G (ref 0–30)

## 2018-07-31 ENCOUNTER — OFFICE VISIT (OUTPATIENT)
Dept: FAMILY MEDICINE CLINIC | Age: 53
End: 2018-07-31

## 2018-07-31 ENCOUNTER — HOSPITAL ENCOUNTER (OUTPATIENT)
Dept: LAB | Age: 53
Discharge: HOME | End: 2018-07-31
Payer: COMMERCIAL

## 2018-07-31 VITALS
RESPIRATION RATE: 16 BRPM | HEART RATE: 100 BPM | BODY MASS INDEX: 31.19 KG/M2 | HEIGHT: 68 IN | WEIGHT: 205.8 LBS | SYSTOLIC BLOOD PRESSURE: 132 MMHG | OXYGEN SATURATION: 98 % | TEMPERATURE: 98.7 F | DIASTOLIC BLOOD PRESSURE: 80 MMHG

## 2018-07-31 DIAGNOSIS — E55.9 VITAMIN D DEFICIENCY: ICD-10-CM

## 2018-07-31 DIAGNOSIS — Z00.00 ROUTINE GENERAL MEDICAL EXAMINATION AT A HEALTH CARE FACILITY: Primary | ICD-10-CM

## 2018-07-31 DIAGNOSIS — E78.2 MIXED HYPERLIPIDEMIA: ICD-10-CM

## 2018-07-31 DIAGNOSIS — Z12.5 SCREENING FOR PROSTATE CANCER: ICD-10-CM

## 2018-07-31 DIAGNOSIS — Z23 NEED FOR SHINGLES VACCINE: ICD-10-CM

## 2018-07-31 DIAGNOSIS — I10 ESSENTIAL HYPERTENSION: ICD-10-CM

## 2018-07-31 DIAGNOSIS — E11.9 CONTROLLED TYPE 2 DIABETES MELLITUS WITHOUT COMPLICATION, WITHOUT LONG-TERM CURRENT USE OF INSULIN (HCC): ICD-10-CM

## 2018-07-31 DIAGNOSIS — S63.610A SPRAIN OF RIGHT INDEX FINGER, UNSPECIFIED SITE OF FINGER, INITIAL ENCOUNTER: ICD-10-CM

## 2018-07-31 DIAGNOSIS — E66.9 OBESITY (BMI 30.0-34.9): ICD-10-CM

## 2018-07-31 DIAGNOSIS — Z23 ENCOUNTER FOR IMMUNIZATION: ICD-10-CM

## 2018-07-31 LAB — PSA SERPL-MCNC: 1.1 NG/ML (ref 0–4)

## 2018-07-31 PROCEDURE — 36415 COLL VENOUS BLD VENIPUNCTURE: CPT | Performed by: FAMILY MEDICINE

## 2018-07-31 PROCEDURE — 84153 ASSAY OF PSA TOTAL: CPT | Performed by: FAMILY MEDICINE

## 2018-07-31 NOTE — PROGRESS NOTES
Jacqui Rosario is a 47 y.o. male here for physical  
 
Jacqui Rosario is a 47 y.o. male (: 1965) presenting to address: Chief Complaint Patient presents with  Physical  
  pt here for physical   
 
 
Vitals:  
 18 0737 BP: 132/80 Pulse: 100 Resp: 16 SpO2: 98% Weight: 205 lb 12.8 oz (93.4 kg) Height: 5' 8\" (1.727 m) PainSc:   0 - No pain Hearing/Vision: No exam data present Learning Assessment:  
 
Learning Assessment 2016 PRIMARY LEARNER Patient HIGHEST LEVEL OF EDUCATION - PRIMARY LEARNER  2 YEARS OF COLLEGE  
BARRIERS PRIMARY LEARNER NONE  
CO-LEARNER CAREGIVER No  
PRIMARY LANGUAGE ENGLISH  
LEARNER PREFERENCE PRIMARY READING  
ANSWERED BY patient RELATIONSHIP SELF Depression Screening: PHQ over the last two weeks 2018 Little interest or pleasure in doing things Not at all Feeling down, depressed, irritable, or hopeless Not at all Total Score PHQ 2 0 Fall Risk Assessment:  
No flowsheet data found. Abuse Screening: No flowsheet data found. Coordination of Care Questionaire: 1. Have you been to the ER, urgent care clinic since your last visit? Hospitalized since your last visit? NO 
 
2. Have you seen or consulted any other health care providers outside of the [facility] since your last visit? Include any pap smears or colon screening. NO Advanced Directive: 1. Do you have an Advanced Directive? NO 
 
2. Would you like information on Advanced Directives?  NO

## 2018-07-31 NOTE — PATIENT INSTRUCTIONS
Continue current medications. Avoid dietary starch and sugar and follow a program of regular aerobic exercise. You will be advised of your lab results. Follow finger exercise instructions, Follow up for new symptoms, worsening symptoms or failure to improve. Pramod Monterroso Dr. to send diabetic eye exam note Shing[facility] at pharmacy Return for follow up in 6 months, sooner with any problems. Well Visit, Men 48 to 72: Care Instructions Your Care Instructions Physical exams can help you stay healthy. Your doctor has checked your overall health and may have suggested ways to take good care of yourself. He or she also may have recommended tests. At home, you can help prevent illness with healthy eating, regular exercise, and other steps. Follow-up care is a key part of your treatment and safety. Be sure to make and go to all appointments, and call your doctor if you are having problems. It's also a good idea to know your test results and keep a list of the medicines you take. How can you care for yourself at home? · Reach and stay at a healthy weight. This will lower your risk for many problems, such as obesity, diabetes, heart disease, and high blood pressure. · Get at least 30 minutes of exercise on most days of the week. Walking is a good choice. You also may want to do other activities, such as running, swimming, cycling, or playing tennis or team sports. · Do not smoke. Smoking can make health problems worse. If you need help quitting, talk to your doctor about stop-smoking programs and medicines. These can increase your chances of quitting for good. · Protect your skin from too much sun. When you're outdoors from 10 a.m. to 4 p.m., stay in the shade or cover up with clothing and a hat with a wide brim. Wear sunglasses that block UV rays. Even when it's cloudy, put broad-spectrum sunscreen (SPF 30 or higher) on any exposed skin.  
· See a dentist one or two times a year for checkups and to have your teeth cleaned. · Wear a seat belt in the car. · Limit alcohol to 2 drinks a day. Too much alcohol can cause health problems. Follow your doctor's advice about when to have certain tests. These tests can spot problems early. · Cholesterol. Your doctor will tell you how often to have this done based on your overall health and other things that can increase your risk for heart attack and stroke. · Blood pressure. Have your blood pressure checked during a routine doctor visit. Your doctor will tell you how often to check your blood pressure based on your age, your blood pressure results, and other factors. · Prostate exam. Talk to your doctor about whether you should have a blood test (called a PSA test) for prostate cancer. Experts disagree on whether men should have this test. Some experts recommend that you discuss the benefits and risks of the test with your doctor. · Diabetes. Ask your doctor whether you should have tests for diabetes. · Vision. Some experts recommend that you have yearly exams for glaucoma and other age-related eye problems starting at age 48. · Hearing. Tell your doctor if you notice any change in your hearing. You can have tests to find out how well you hear. · Colon cancer. You should begin tests for colon cancer at age 48. You may have one of several tests. Your doctor will tell you how often to have tests based on your age and risk. Risks include whether you already had a precancerous polyp removed from your colon or whether your parent, brother, sister, or child has had colon cancer. · Heart attack and stroke risk. At least every 4 to 6 years, you should have your risk for heart attack and stroke assessed. Your doctor uses factors such as your age, blood pressure, cholesterol, and whether you smoke or have diabetes to show what your risk for a heart attack or stroke is over the next 10 years. · Abdominal aortic aneurysm.  Ask your doctor whether you should have a test to check for an aneurysm. You may need a test if you ever smoked or if your parent, brother, sister, or child has had an aneurysm. When should you call for help? Watch closely for changes in your health, and be sure to contact your doctor if you have any problems or symptoms that concern you. Where can you learn more? Go to http://elin-mark.info/. Enter V902 in the search box to learn more about \"Well Visit, Men 48 to 72: Care Instructions. \" Current as of: Mary 10, 2017 Content Version: 11.7 © 9289-7999 [employer]. Care instructions adapted under license by [employer] (which disclaims liability or warranty for this information). If you have questions about a medical condition or this instruction, always ask your healthcare professional. [address] any warranty or liability for your use of this information. Starting a Weight Loss Plan: Care Instructions Your Care Instructions If you are thinking about losing weight, it can be hard to know where to start. Your doctor can help you set up a weight loss plan that best meets your needs. You may want to take a class on nutrition or exercise, or join a weight loss support group. If you have questions about how to make changes to your eating or exercise habits, ask your doctor about seeing a registered dietitian or an exercise specialist. 
It can be a big challenge to lose weight. But you do not have to make huge changes at once. Make small changes, and stick with them. When those changes become habit, add a few more changes. If you do not think you are ready to make changes right now, try to pick a date in the future. Make an appointment to see your doctor to discuss whether the time is right for you to start a plan. Follow-up care is a key part of your treatment and safety. Be sure to make and go to all appointments, and call your doctor if you are having problems.  It's also a good idea to know your test results and keep a list of the medicines you take. How can you care for yourself at home? · Set realistic goals. Many people expect to lose much more weight than is likely. A weight loss of 5% to 10% of your body weight may be enough to improve your health. · Get family and friends involved to provide support. Talk to them about why you are trying to lose weight, and ask them to help. They can help by participating in exercise and having meals with you, even if they may be eating something different. · Find what works best for you. If you do not have time or do not like to cook, a program that offers meal replacement bars or shakes may be better for you. Or if you like to prepare meals, finding a plan that includes daily menus and recipes may be best. 
· Ask your doctor about other health professionals who can help you achieve your weight loss goals. ¨ A dietitian can help you make healthy changes in your diet. ¨ An exercise specialist or  can help you develop a safe and effective exercise program. 
¨ A counselor or psychiatrist can help you cope with issues such as depression, anxiety, or family problems that can make it hard to focus on weight loss. · Consider joining a support group for people who are trying to lose weight. Your doctor can suggest groups in your area. Where can you learn more? Go to http://elin-mark.info/. Enter P522 in the search box to learn more about \"Starting a Weight Loss Plan: Care Instructions. \" Current as of: October 9, 2017 Content Version: 11.7 © 8722-3929 [employer]. Care instructions adapted under license by [email or web address] (which disclaims liability or warranty for this information). If you have questions about a medical condition or this instruction, always ask your healthcare professional. [place] [address] any warranty or liability for your use of this information.  
 
  
Learning About Low-Carbohydrate Diets for Weight Loss What is a low-carbohydrate diet? Low-carb diets avoid foods that are high in carbohydrate. These high-carb foods include pasta, bread, rice, cereal, fruits, and starchy vegetables. Instead, these diets usually have you eat foods that are high in fat and protein. Many people lose weight quickly on a low-carb diet. But the early weight loss is water. People on this diet often gain the weight back after they start eating carbs again. Not all diet plans are safe or work well. A lot of the evidence shows that low-carb diets aren't healthy. That's because these diets often don't include healthy foods like fruits and vegetables. Losing weight safely means balancing protein, fat, and carbs with every meal and snack. And low-carb diets don't always provide the vitamins, minerals, and fiber you need. If you have a serious medical condition, talk to your doctor before you try any diet. These conditions include kidney disease, heart disease, type 2 diabetes, high cholesterol, and high blood pressure. If you are pregnant, it may not be safe for your baby if you are on a low-carb diet. How can you lose weight safely? You might have heard that a diet plan helped another person lose weight. But that doesn't mean that it will work for you. It is very hard to stay on a diet that includes lots of big changes in your eating habits. If you want to get to a healthy weight and stay there, making healthy lifestyle changes will often work better than dieting. These steps can help. · Make a plan for change. Work with your doctor to create a plan that is right for you. · See a dietitian. He or she can show you how to make healthy changes in your eating habits. · Manage stress. If you have a lot of stress in your life, it can be hard to focus on making healthy changes to your daily habits. · Track your food and activity.  You are likely to do better at losing weight if you keep track of what you eat and what you do. Follow-up care is a key part of your treatment and safety. Be sure to make and go to all appointments, and call your doctor if you are having problems. It's also a good idea to know your test results and keep a list of the medicines you take. Where can you learn more? Go to http://elin-mark.info/. Enter A121 in the search box to learn more about \"Learning About Low-Carbohydrate Diets for Weight Loss. \" Current as of: May 12, 2017 Content Version: 11.7 © 6930-3581 [employer]. Care instructions adapted under license by [employer] (which disclaims liability or warranty for this information). If you have questions about a medical condition or this instruction, always ask your healthcare professional. [address] any warranty or liability for your use of this information. Finger: Exercises Your Care Instructions Here are some examples of exercises for your fingers. Start each exercise slowly. Ease off the exercise if you start to have pain. Your doctor or your physical or occupational therapist will tell you when you can start these exercises and which ones will work best for you. How to do the exercises Tendon glides 1. In this exercise, the steps follow one another to make a continuous movement. 2. With one hand, point your fingers and thumb straight up. Your wrist should be relaxed, following the line of your fingers and thumb. 3. Curl your fingers so that the top two joints in them are bent, and your fingers wrap down. Your fingertips should touch or be near the base of your fingers. Your fingers will look like a hook. 4. Make a fist by bending your knuckles. Your thumb can gently rest against your index (pointing) finger. 5. Unwind your fingers slightly so that your fingertips can touch the base of your palm. Your thumb can rest against your index finger.  Hold that position for about 6 seconds. 6. Move back to your starting position, with your fingers and thumb pointing up. 7. Repeat the series of motions 8 to 12 times. 8. Switch hands, and repeat steps 1 through 6. Thumb flexion/extension 1. Place your forearm and hand on a table with your thumb pointing up. 2. Bend your thumb downward and across your palm so that your thumb touches the base of your little finger. Hold that position for about 6 seconds. Then straighten your thumb. 3. Repeat 8 to 12 times. 4. Switch hands, and repeat steps 1 through 3. Thumb abduction/adduction 1. With one hand, point your fingers and thumb straight up. Your wrist should be relaxed, following the line of your fingers and thumb. 2. Pull your thumb away from your palm as far as you can. Hold that position for about 6 seconds. Then slowly move your thumb back to the starting position, with your thumb resting against your index (pointing) finger. 3. Repeat 8 to 12 times. 4. Switch hands, and repeat steps 1 through 3. Finger opposition 1. With one hand, point your fingers and thumb straight up. Your wrist should be relaxed, following the line of your fingers and thumb. 2. Touch your thumb to each finger, one finger at a time. This will look like an \"okay\" sign, but try to keep your other fingers straight and pointing upward as much as you can. 3. Repeat 8 to 12 times. 4. Switch hands, and repeat steps 1 through 3. Follow-up care is a key part of your treatment and safety. Be sure to make and go to all appointments, and call your doctor if you are having problems. It's also a good idea to know your test results and keep a list of the medicines you take. Where can you learn more? Go to http://elin-mark.info/. Enter D701 in the search box to learn more about \"Finger: Exercises. \" Current as of: November 29, 2017 Content Version: 11.7 © 0247-1621 [employer].  Care instructions adapted under license by [employer] (which disclaims liability or warranty for this information). If you have questions about a medical condition or this instruction, always ask your healthcare professional. [address] any warranty or liability for your use of this information.

## 2018-07-31 NOTE — MR AVS SNAPSHOT
[address] [place] [address]  [address] 9558 [facility] [address] [phone] Patient: Mamadou Nesbitt MRN: [id number] :1965 Visit Information Date & Time Provider Department Dept. Phone Encounter #  
 2018  7:30 AM Sandro Bar, 3 [facility] [phone] [id number] Upcoming Health Maintenance Date Due Pneumococcal 19-64 Medium Risk (1 of 1 - PPSV23) 10/30/1983 DTaP/Tdap/Td series (1 - Tdap) 10/30/1985 EYE EXAM RETINAL OR DILATED Q1 2018 FOOT EXAM Q1 2018 Influenza Age 5 to Adult 2018 HEMOGLOBIN A1C Q6M 2019 MICROALBUMIN Q1 2019 LIPID PANEL Q1 2019 COLONOSCOPY 3/31/2027 Allergies as of 2018  Review Complete On: 2018 By: Sandro Bar MD  
  
 Severity Noted Reaction Type Reactions Penicillins    Rash Current Immunizations  Never Reviewed Name Date Influenza Vaccine (Quad) PF 2017 Pneumococcal Polysaccharide (PPSV-23)  Incomplete Not reviewed this visit You Were Diagnosed With   
  
 Codes Comments Routine general medical examination at a health care facility    -  Primary ICD-10-CM: Z00.00 ICD-9-CM: V70.0 Controlled type 2 diabetes mellitus without complication, without long-term current use of insulin ([facility]     ICD-10-CM: E11.9 ICD-9-CM: 250.00 Mixed hyperlipidemia     ICD-10-CM: E78.2 ICD-9-CM: 272.2 Essential hypertension     ICD-10-CM: I10 
ICD-9-CM: 401.9 Vitamin D deficiency     ICD-10-CM: E55.9 ICD-9-CM: 268.9 Obesity (BMI 30.0-34.9)     ICD-10-CM: P39.1 ICD-9-CM: 278.00 Encounter for immunization     ICD-10-CM: I93 ICD-9-CM: V03.89 Sprain of right index finger, unspecified site of finger, initial encounter     ICD-10-CM: S63.610A 
ICD-9-CM: 842.10 Need for shingles vaccine     ICD-10-CM: J10 ICD-9-CM: V04.89 Screening for prostate cancer     ICD-10-CM: Z12.5 ICD-9-CM: V76.44 Vitals BP Pulse Temp Resp Height(growth percentile) Weight(growth percentile) 132/80 (BP 1 Location: Left arm, BP Patient Position: Sitting) 100 98.7 °F (37.1 °C) (Oral) 16 5' 8\" (1.727 m) 205 lb 12.8 oz (93.4 kg) SpO2 BMI Smoking Status 98% 31.29 kg/m2 Former Smoker Vitals History BMI and BSA Data Body Mass Index Body Surface Area  
 31.29 kg/m 2 2.12 m 2 Preferred Pharmacy Pharmacy Name Phone 509 [facility], [address]  [facility] [phone] Your Updated Medication List  
  
   
This list is accurate as of 18  8:07 AM.  Always use your most recent med list.  
  
  
  
  
 atorvastatin 80 mg tablet Commonly known as:  LIPITOR Take 1 Tab by mouth daily. ergocalciferol 50,000 unit capsule Commonly known as:  VITAMIN D2 Take 1 Cap by mouth every seven (7) days. losartan-hydroCHLOROthiazide 100-12.5 mg per tablet Commonly known as:  HYZAAR  
TAKE 1 TABLET BY MOUTH DAILY  
  
 metFORMIN 500 mg tablet Commonly known as:  GLUCOPHAGE  
TAKE 1 TABLET BY MOUTH DAILY WITH SUPPER  
  
 varicella-zoster recombinant (PF) 50 mcg/0.5 mL Susr injection Commonly known as:  SHINGRIX  
0.5 mL by IntraMUSCular route once for 1 dose. Prescriptions Sent to Pharmacy Refills  
 varicella-zoster recombinant, PF, (SHINGRIX) 50 mcg/0.5 mL susr injection 0 Si.5 mL by IntraMUSCular route once for 1 dose. Class: Normal  
 Pharmacy: [facility], [address] [place] [address] [address] Ph #: [phone] Route: IntraMUSCular We Performed the Following PNEUMOCOCCAL POLYSACCHARIDE VACCINE, 23-VALENT, ADULT OR IMMUNOSUPPRESSED PT DOSE, [64428 CPT(R)] To-Do List   
 2018 Lab:  PSA SCREENING (SCREENING) Patient Instructions Continue current medications. Avoid dietary starch and sugar and follow a program of regular aerobic exercise. You will be advised of your lab results. Follow finger exercise instructions, Follow up for new symptoms, worsening symptoms or failure to improve. Guillermo Hawley Dr. to send diabetic eye exam note Shingrix at pharmacy Return for follow up in 6 months, sooner with any problems. Well Visit, Men 48 to 72: Care Instructions Your Care Instructions Physical exams can help you stay healthy. Your doctor has checked your overall health and may have suggested ways to take good care of yourself. He or she also may have recommended tests. At home, you can help prevent illness with healthy eating, regular exercise, and other steps. Follow-up care is a key part of your treatment and safety. Be sure to make and go to all appointments, and call your doctor if you are having problems. It's also a good idea to know your test results and keep a list of the medicines you take. How can you care for yourself at home? · Reach and stay at a healthy weight. This will lower your risk for many problems, such as obesity, diabetes, heart disease, and high blood pressure. · Get at least 30 minutes of exercise on most days of the week. Walking is a good choice. You also may want to do other activities, such as running, swimming, cycling, or playing tennis or team sports. · Do not smoke. Smoking can make health problems worse. If you need help quitting, talk to your doctor about stop-smoking programs and medicines. These can increase your chances of quitting for good. · Protect your skin from too much sun. When you're outdoors from 10 a.m. to 4 p.m., stay in the shade or cover up with clothing and a hat with a wide brim. Wear sunglasses that block UV rays. Even when it's cloudy, put broad-spectrum sunscreen (SPF 30 or higher) on any exposed skin.  
· See a dentist one or two times a year for checkups and to have your teeth cleaned. · Wear a seat belt in the car. · Limit alcohol to 2 drinks a day. Too much alcohol can cause health problems. Follow your doctor's advice about when to have certain tests. These tests can spot problems early. · Cholesterol. Your doctor will tell you how often to have this done based on your overall health and other things that can increase your risk for heart attack and stroke. · Blood pressure. Have your blood pressure checked during a routine doctor visit. Your doctor will tell you how often to check your blood pressure based on your age, your blood pressure results, and other factors. · Prostate exam. Talk to your doctor about whether you should have a blood test (called a PSA test) for prostate cancer. Experts disagree on whether men should have this test. Some experts recommend that you discuss the benefits and risks of the test with your doctor. · Diabetes. Ask your doctor whether you should have tests for diabetes. · Vision. Some experts recommend that you have yearly exams for glaucoma and other age-related eye problems starting at age 48. · Hearing. Tell your doctor if you notice any change in your hearing. You can have tests to find out how well you hear. · Colon cancer. You should begin tests for colon cancer at age 48. You may have one of several tests. Your doctor will tell you how often to have tests based on your age and risk. Risks include whether you already had a precancerous polyp removed from your colon or whether your parent, brother, sister, or child has had colon cancer. · Heart attack and stroke risk. At least every 4 to 6 years, you should have your risk for heart attack and stroke assessed. Your doctor uses factors such as your age, blood pressure, cholesterol, and whether you smoke or have diabetes to show what your risk for a heart attack or stroke is over the next 10 years. · Abdominal aortic aneurysm.  Ask your doctor whether you should have a test to check for an aneurysm. You may need a test if you ever smoked or if your parent, brother, sister, or child has had an aneurysm. When should you call for help? Watch closely for changes in your health, and be sure to contact your doctor if you have any problems or symptoms that concern you. Where can you learn more? Go to http://elin-mark.info/. Enter T475 in the search box to learn more about \"Well Visit, Men 48 to 72: Care Instructions. \" Current as of: Mary 10, 2017 Content Version: 11.7 © 0008-2647 Startlocal. Care instructions adapted under license by Wishdates (which disclaims liability or warranty for this information). If you have questions about a medical condition or this instruction, always ask your healthcare professional. [address] any warranty or liability for your use of this information. Starting a Weight Loss Plan: Care Instructions Your Care Instructions If you are thinking about losing weight, it can be hard to know where to start. Your doctor can help you set up a weight loss plan that best meets your needs. You may want to take a class on nutrition or exercise, or join a weight loss support group. If you have questions about how to make changes to your eating or exercise habits, ask your doctor about seeing a registered dietitian or an exercise specialist. 
It can be a big challenge to lose weight. But you do not have to make huge changes at once. Make small changes, and stick with them. When those changes become habit, add a few more changes. If you do not think you are ready to make changes right now, try to pick a date in the future. Make an appointment to see your doctor to discuss whether the time is right for you to start a plan. Follow-up care is a key part of your treatment and safety.  Be sure to make and go to all appointments, and call your doctor if you are having problems. It's also a good idea to know your test results and keep a list of the medicines you take. How can you care for yourself at home? · Set realistic goals. Many people expect to lose much more weight than is likely. A weight loss of 5% to 10% of your body weight may be enough to improve your health. · Get family and friends involved to provide support. Talk to them about why you are trying to lose weight, and ask them to help. They can help by participating in exercise and having meals with you, even if they may be eating something different. · Find what works best for you. If you do not have time or do not like to cook, a program that offers meal replacement bars or shakes may be better for you. Or if you like to prepare meals, finding a plan that includes daily menus and recipes may be best. 
· Ask your doctor about other health professionals who can help you achieve your weight loss goals. ¨ A dietitian can help you make healthy changes in your diet. ¨ An exercise specialist or  can help you develop a safe and effective exercise program. 
¨ A counselor or psychiatrist can help you cope with issues such as depression, anxiety, or family problems that can make it hard to focus on weight loss. · Consider joining a support group for people who are trying to lose weight. Your doctor can suggest groups in your area. Where can you learn more? Go to http://elin-mark.info/. Enter G665 in the search box to learn more about \"Starting a Weight Loss Plan: Care Instructions. \" Current as of: October 9, 2017 Content Version: 11.7 © 3946-9042 Healthwise, Incorporated. Care instructions adapted under license by Elasticsearch (which disclaims liability or warranty for this information).  If you have questions about a medical condition or this instruction, always ask your healthcare professional. [employer] disclaims any warranty or liability for your use of this information. Learning About Low-Carbohydrate Diets for Weight Loss What is a low-carbohydrate diet? Low-carb diets avoid foods that are high in carbohydrate. These high-carb foods include pasta, bread, rice, cereal, fruits, and starchy vegetables. Instead, these diets usually have you eat foods that are high in fat and protein. Many people lose weight quickly on a low-carb diet. But the early weight loss is water. People on this diet often gain the weight back after they start eating carbs again. Not all diet plans are safe or work well. A lot of the evidence shows that low-carb diets aren't healthy. That's because these diets often don't include healthy foods like fruits and vegetables. Losing weight safely means balancing protein, fat, and carbs with every meal and snack. And low-carb diets don't always provide the vitamins, minerals, and fiber you need. If you have a serious medical condition, talk to your doctor before you try any diet. These conditions include kidney disease, heart disease, type 2 diabetes, high cholesterol, and high blood pressure. If you are pregnant, it may not be safe for your baby if you are on a low-carb diet. How can you lose weight safely? You might have heard that a diet plan helped another person lose weight. But that doesn't mean that it will work for you. It is very hard to stay on a diet that includes lots of big changes in your eating habits. If you want to get to a healthy weight and stay there, making healthy lifestyle changes will often work better than dieting. These steps can help. · Make a plan for change. Work with your doctor to create a plan that is right for you. · See a dietitian. He or she can show you how to make healthy changes in your eating habits. · Manage stress. If you have a lot of stress in your life, it can be hard to focus on making healthy changes to your daily habits. · Track your food and activity. You are likely to do better at losing weight if you keep track of what you eat and what you do. Follow-up care is a key part of your treatment and safety. Be sure to make and go to all appointments, and call your doctor if you are having problems. It's also a good idea to know your test results and keep a list of the medicines you take. Where can you learn more? Go to http://elin-mark.info/. Enter A121 in the search box to learn more about \"Learning About Low-Carbohydrate Diets for Weight Loss. \" Current as of: May 12, 2017 Content Version: 11.7 © 2144-6601 ZZNode Science and Technology. Care instructions adapted under license by Serus (which disclaims liability or warranty for this information). If you have questions about a medical condition or this instruction, always ask your healthcare professional. [place] [address] any warranty or liability for your use of this information. Finger: Exercises Your Care Instructions Here are some examples of exercises for your fingers. Start each exercise slowly. Ease off the exercise if you start to have pain. Your doctor or your physical or occupational therapist will tell you when you can start these exercises and which ones will work best for you. How to do the exercises Tendon glides 1. In this exercise, the steps follow one another to make a continuous movement. 2. With one hand, point your fingers and thumb straight up. Your wrist should be relaxed, following the line of your fingers and thumb. 3. Curl your fingers so that the top two joints in them are bent, and your fingers wrap down. Your fingertips should touch or be near the base of your fingers. Your fingers will look like a hook. 4. Make a fist by bending your knuckles. Your thumb can gently rest against your index (pointing) finger. 5.  Unwind your fingers slightly so that your fingertips can touch the base of your palm. Your thumb can rest against your index finger. Hold that position for about 6 seconds. 6. Move back to your starting position, with your fingers and thumb pointing up. 7. Repeat the series of motions 8 to 12 times. 8. Switch hands, and repeat steps 1 through 6. Thumb flexion/extension 1. Place your forearm and hand on a table with your thumb pointing up. 2. Bend your thumb downward and across your palm so that your thumb touches the base of your little finger. Hold that position for about 6 seconds. Then straighten your thumb. 3. Repeat 8 to 12 times. 4. Switch hands, and repeat steps 1 through 3. Thumb abduction/adduction 1. With one hand, point your fingers and thumb straight up. Your wrist should be relaxed, following the line of your fingers and thumb. 2. Pull your thumb away from your palm as far as you can. Hold that position for about 6 seconds. Then slowly move your thumb back to the starting position, with your thumb resting against your index (pointing) finger. 3. Repeat 8 to 12 times. 4. Switch hands, and repeat steps 1 through 3. Finger opposition 1. With one hand, point your fingers and thumb straight up. Your wrist should be relaxed, following the line of your fingers and thumb. 2. Touch your thumb to each finger, one finger at a time. This will look like an \"okay\" sign, but try to keep your other fingers straight and pointing upward as much as you can. 3. Repeat 8 to 12 times. 4. Switch hands, and repeat steps 1 through 3. Follow-up care is a key part of your treatment and safety. Be sure to make and go to all appointments, and call your doctor if you are having problems. It's also a good idea to know your test results and keep a list of the medicines you take. Where can you learn more? Go to http://elin-mark.info/. Enter E577 in the search box to learn more about \"Finger: Exercises. \" Current as of: November 29, 2017 Content Version: 11.7 © 0315-2427 BigML, Incorporated. Care instructions adapted under license by Syndax Pharmaceuticals (which disclaims liability or warranty for this information). If you have questions about a medical condition or this instruction, always ask your healthcare professional. [address] any warranty or liability for your use of this information. Introducing [facility]! Dear Lovette Osgood: 
Thank you for requesting a [facility] account. Our records indicate that you already have an active [employer] [facility] account. You can access your account anytime at [email or web address] Did you know that you can access your hospital and ER discharge instructions at any time in [facility]? You can also review all of your test results from your hospital stay or ER visit. Additional Information If you have questions, please visit the Frequently Asked Questions section of the [employer] website at [email or web address] Remember, [facility] is NOT to be used for urgent needs. For medical emergencies, dial 911. Now available from your iPhone and Android! Please provide this summary of care documentation to your next provider. Your primary care clinician is listed as Anthony West. If you have any questions after today's visit, please call [phone].

## 2018-07-31 NOTE — PROGRESS NOTES
HISTORY OF PRESENT ILLNESS Keven Ayoub is a 47 y.o. male. Physical  
The history is provided by the patient and medical records. Pertinent negatives include no abdominal pain and no shortness of breath. Diabetes The history is provided by the patient and medical records. This is a chronic problem. Pertinent negatives include no abdominal pain and no shortness of breath. Past Medical History:  
Diagnosis Date  Diabetes ([address] 2006  Hypercholesterolemia  Hypertension 2006  Sleep apnea Past Surgical History:  
Procedure Laterality Date  COLONOSCOPY N/A 3/31/2017 COLONOSCOPY w hot polypectomy performed by Marilu Grant MD at [address] [place] [address]  2/2014 Family History Problem Relation Age of Onset  Cancer Sister   
  rectal  
 Cancer Maternal Uncle   
  colon  Diabetes Maternal Uncle  Cancer Maternal Grandfather   
  colon  Diabetes Maternal Grandfather  Cancer Paternal Grandfather   
  unknown type  Heart Disease Neg Hx  Hypertension Neg Hx History Smoking Status  Former Smoker Smokeless Tobacco  
 Former User  Quit date: 3/17/2015 Comment: quit over a year ago History Alcohol Use  Yes Comment: occasionally Review of Systems Constitutional: Negative for chills, fever and weight loss. HENT: Negative for hearing loss. Respiratory: Negative for cough, shortness of breath and wheezing. Gastrointestinal: Negative for abdominal pain, blood in stool, constipation, diarrhea, heartburn, melena, nausea and vomiting. Genitourinary: Negative for dysuria and urgency. Musculoskeletal: Joint pain:    
Skin: Negative for itching and rash. Psychiatric/Behavioral: Negative for depression. The patient is not nervous/anxious and does not have insomnia. Health Maintenance Review: 
Immunizations, Tetanus - a few months ago Influenza - current Pneumococcal - due for pneumovax Colonoscopy: 2-3 years ago, normal 
Diabetic eye exam - scheduled today PSA - Pros and cons of PSA screening discussed, patient elects to have the test. 
Visit Vitals  /80 (BP 1 Location: Left arm, BP Patient Position: Sitting)  Pulse 100  Temp 98.7 °F (37.1 °C) (Oral)  Resp 16  
 Ht 5' 8\" (1.727 m)  Wt 205 lb 12.8 oz (93.4 kg)  SpO2 98%  BMI 31.29 kg/m2 Physical Exam  
Constitutional: He is oriented to person, place, and time. He appears well-developed and well-nourished. HENT:  
Head: Normocephalic. Right Ear: Tympanic membrane and ear canal normal.  
Left Ear: Tympanic membrane and ear canal normal.  
Mouth/Throat: Oropharynx is clear and moist.  
Eyes: Conjunctivae and EOM are normal. Pupils are equal, round, and reactive to light. Neck: Neck supple. Pulmonary/Chest: Effort normal and breath sounds normal.  
Abdominal: Soft. Bowel sounds are normal. There is no tenderness. Neurological: He is alert and oriented to person, place, and time. He has normal reflexes. Skin: Skin is warm and dry. Psychiatric: He has a normal mood and affect. His behavior is normal.  
Nursing note and vitals reviewed. Annual CPE/Wellness Encounter Plan: PPSV-23 today Shingrix ordered  pharmacy Anticipatory guidance and recommendations provided verbally and with printed information. Return for annual physical exam in 1 year, sooner with any problems. Problems Encounter: HPI - Follow up type 2 diabetes, essential hypertension, hyperlipidemia, Vitamin D deficiency, obesity, reports persistent stiffness right index finger after a hyperextension injury 4-5 weeks ago ROS: 
Eyes: Negative for blurred vision and double vision. Due for diabetic eye exam  
Cardiovascular: Negative for chest pain, palpitations and leg swelling. Genitourinary: Negative for dysuria and urgency.   
Musculoskeletal: Positive for back pain (chronic) and joint pain (right index feinger with decreased flexion since hyperextension injury a  month). Negative for myalgias. Skin: Negative for itching and rash. Neurological: Positive for sensory change (right lateral leg, Hx HNP). Negative for dizziness, tingling, focal weakness and headaches. Endo/Heme/Allergies: Positive for environmental allergies (seasonal). Negative for polydipsia (or polyuria). Exam: 
Cardiovascular: Normal rate, regular rhythm, normal heart sounds and intact distal pulses. BP well controlled Musculoskeletal: He exhibits no edema. Right index finger without edema or tenderness, unable to flex fully at MCP, PIP joints Diabetic foot exam performed by Ramses Palma MD  
 
Measurement  Response Nurse Comment Physician Comment Monofilament  R - normal sensation with micro filament L - normal sensation with micro filament Pulse DP R - 2+ (normal) L - 2+ (normal) Pulse TP R - trace L - trace Structural deformity R - None L - None Skin Integrity / Deformity R - None L - None Results for Theresa Chavez (MRN [id number]) as of 7/31/2018 13:02 Ref. Range 7/20/2017 08:44 9/29/2017 08:45 7/26/2018 08:26 Triglyceride Latest Ref Range: <150 MG/ (H) 117 95 Cholesterol, total Latest Ref Range: <200 MG/ 150 112 HDL Cholesterol Latest Ref Range: 40 - 60 MG/DL 40 40 32 (L) CHOL/HDL Ratio Latest Ref Range: 0 - 5.0   5.0 3.8 3.5 LDL, calculated Latest Ref Range: 0 - 100 MG/ (H) 86.6 61 VLDL, calculated Latest Units: MG/DL 35 23.4 19 Hemoglobin A1c, (calculated) Latest Ref Range: 4.2 - 5.6 % 6.5 (H) 6.6 (H) 6.8 (H) Est. average glucose Latest Units: mg/dL 140 143 148 Creatinine, urine Latest Ref Range: 30 - 125 mg/dL 418.00 (H)  355.71 (H) Microalbumin,urine random Latest Ref Range: 0 - 3.0 MG/DL 2.30  6.10 (H) Microalbumin/Creat. Ratio Latest Ref Range: 0 - 30 mg/g 6  17 Results for Theresa Chavez (MRN [id number]) as of 7/31/2018 13:02 Ref.  Range 7/20/2017 08:44 9/29/2017 08:45 7/26/2018 08:26 Vitamin D 25-Hydroxy Latest Ref Range: 30 - 100 ng/mL 12.6 (L) 44.9 23.7 (L) ASSESSMENT and PLAN 
  ICD-10-CM ICD-9-CM 1. Routine general medical examination at a health care facility Z00.00 V70.0 2. Controlled type 2 diabetes mellitus without complication, without long-term current use of insulin (HCC) E11.9 250.00   
3. Mixed hyperlipidemia E78.2 272.2 4. Essential hypertension I10 401.9 5. Vitamin D deficiency E55.9 268.9 6. Obesity (BMI 30.0-34. 9) E66.9 278.00   
7. Encounter for immunization Z23 V03.89 PNEUMOCOCCAL POLYSACCHARIDE VACCINE, 23-VALENT, ADULT OR IMMUNOSUPPRESSED PT DOSE, 8. Sprain of right index finger, unspecified site of finger, initial encounter S63.610A 842.10 9. Need for shingles vaccine Z23 V04.89 varicella-zoster recombinant, PF, (SHINGRIX) 50 mcg/0.5 mL susr injection 10. Screening for prostate cancer Z12.5 V76.44 PSA SCREENING (SCREENING) Continue current medications. And resume Vitamin D as prescribed Avoid dietary starch and sugar and follow a program of regular aerobic exercise. Further disposition pending lab results if indicated. Follow finger exercise instructions, Follow up for new symptoms, worsening symptoms or failure to improve. Glenis [address] Dr. to send diabetic eye exam note Shingrix at pharmacy Return for follow up in 6 months, sooner with any problems.

## 2018-09-27 RX ORDER — METFORMIN HYDROCHLORIDE 500 MG/1
TABLET ORAL
Qty: 90 TAB | Refills: 1 | Status: SHIPPED | OUTPATIENT
Start: 2018-09-27 | End: 2019-08-01 | Stop reason: SDUPTHER

## 2018-11-19 ENCOUNTER — HOSPITAL ENCOUNTER (OUTPATIENT)
Dept: LAB | Age: 53
Discharge: HOME | End: 2018-11-19
Payer: COMMERCIAL

## 2018-11-19 ENCOUNTER — OFFICE VISIT (OUTPATIENT)
Dept: FAMILY MEDICINE CLINIC | Age: 53
End: 2018-11-19

## 2018-11-19 VITALS
TEMPERATURE: 97.9 F | SYSTOLIC BLOOD PRESSURE: 133 MMHG | WEIGHT: 207 LBS | HEART RATE: 86 BPM | RESPIRATION RATE: 16 BRPM | DIASTOLIC BLOOD PRESSURE: 90 MMHG | BODY MASS INDEX: 31.37 KG/M2 | OXYGEN SATURATION: 100 % | HEIGHT: 68 IN

## 2018-11-19 DIAGNOSIS — K40.90 LEFT INGUINAL HERNIA: ICD-10-CM

## 2018-11-19 DIAGNOSIS — Z23 ENCOUNTER FOR IMMUNIZATION: ICD-10-CM

## 2018-11-19 DIAGNOSIS — Z86.19 HISTORY OF HELICOBACTER PYLORI INFECTION: ICD-10-CM

## 2018-11-19 DIAGNOSIS — Z00.00 ROUTINE GENERAL MEDICAL EXAMINATION AT A HEALTH CARE FACILITY: ICD-10-CM

## 2018-11-19 DIAGNOSIS — E11.9 CONTROLLED TYPE 2 DIABETES MELLITUS WITHOUT COMPLICATION, WITHOUT LONG-TERM CURRENT USE OF INSULIN (HCC): Primary | ICD-10-CM

## 2018-11-19 LAB — HBA1C MFR BLD HPLC: 6.6 %

## 2018-11-19 PROCEDURE — 83013 H PYLORI (C-13) BREATH: CPT

## 2018-11-19 PROCEDURE — 36415 COLL VENOUS BLD VENIPUNCTURE: CPT

## 2018-11-19 NOTE — PROGRESS NOTES
Bradly Hernandez is a 46 y.o. male (: 1965) presenting to address: Would like to be checked for H.Pyllori because his wife has tested positive and he has tested positive in the past and would like blood work. Bradly Hernandez is a 46 y.o. male who presents today for the Influenza vaccine. Patient was afebrile and completed the immunization questionnaire and consent before administration. No adverse reactions noted while in office. Chief Complaint   Patient presents with    Diabetes       Vitals:    18 0910   BP: 133/90   Pulse: 86   Resp: 16   Temp: 97.9 °F (36.6 °C)   TempSrc: Oral   SpO2: 100%   Weight: 207 lb (93.9 kg)   Height: 5' 8\" (1.727 m)   PainSc:   0 - No pain       Hearing/Vision:   No exam data present    Learning Assessment:     Learning Assessment 2016   PRIMARY LEARNER Patient   HIGHEST LEVEL OF EDUCATION - PRIMARY LEARNER  2 YEARS OF COLLEGE   BARRIERS PRIMARY LEARNER NONE   CO-LEARNER CAREGIVER No   PRIMARY LANGUAGE ENGLISH   LEARNER PREFERENCE PRIMARY READING   ANSWERED BY patient   RELATIONSHIP SELF     Depression Screening:     PHQ over the last two weeks 2018   Little interest or pleasure in doing things Not at all   Feeling down, depressed, irritable, or hopeless Not at all   Total Score PHQ 2 0     Fall Risk Assessment:   No flowsheet data found. Abuse Screening:   No flowsheet data found. Coordination of Care Questionaire:   1. Have you been to the ER, urgent care clinic since your last visit? Hospitalized since your last visit? NO    2. Have you seen or consulted any other health care providers outside of the [address] [place] [address] since your last visit? Include any pap smears or colon screening. NO    Advanced Directive:   1. Do you have an Advanced Directive? NO    2. Would you like information on Advanced Directives?  NO

## 2018-11-19 NOTE — PATIENT INSTRUCTIONS
Return for annual physical in July, sooner with any problems. Return for fasting lab a few days before the appointment. Report any increase in hernia symptoms. Further disposition pending lab results if indicated. Hernia: Care Instructions  Your Care Instructions    A hernia develops when tissue bulges through a weak spot in the wall of your belly. The groin area and the navel are common areas for a hernia. A hernia can also develop near the area of a surgery you had before. Pressure from lifting, straining, or coughing can tear the weak area, causing the hernia to bulge and be painful. If you cannot push a hernia back into place, the tissue may become trapped outside the belly wall. If the hernia gets twisted and loses its blood supply, it will swell and die. This is called a strangulated hernia. It usually causes a lot of pain. It needs treatment right away. Some hernias need to be repaired to prevent a strangulated hernia. If your hernia causes symptoms or is large, you may need surgery. Follow-up care is a key part of your treatment and safety. Be sure to make and go to all appointments, and call your doctor if you are having problems. It's also a good idea to know your test results and keep a list of the medicines you take. How can you care for yourself at home? · Take care when lifting heavy objects. · Stay at a healthy weight. · Do not smoke. Smoking can cause coughing, which can cause your hernia to bulge. If you need help quitting, talk to your doctor about stop-smoking programs and medicines. These can increase your chances of quitting for good. · Talk with your doctor before wearing a corset or truss for a hernia. These devices are not recommended for treating hernias and sometimes can do more harm than good. There may be certain situations when your doctor thinks a truss would work, but these are rare. When should you call for help?   Call your doctor now or seek immediate medical care if:    · You have new or worse belly pain.     · You are vomiting.     · You cannot pass stools or gas.     · You cannot push the hernia back into place with gentle pressure when you are lying down.     · The area over the hernia turns red or becomes tender.    Watch closely for changes in your health, and be sure to contact your doctor if you have any problems. Where can you learn more? Go to http://elin-mark.info/. Enter C129 in the search box to learn more about \"Hernia: Care Instructions. \"  Current as of: March 28, 2018  Content Version: 11.8  © 0226-5549 [employer]. Care instructions adapted under license by [employer] (which disclaims liability or warranty for this information). If you have questions about a medical condition or this instruction, always ask your healthcare professional. [address] any warranty or liability for your use of this information.

## 2018-11-19 NOTE — PROGRESS NOTES
HISTORY OF PRESENT ILLNESS  Luther Smith is a 46 y.o. male. Diabetes   The history is provided by the patient and medical records. This is a chronic problem. Pertinent negatives include no chest pain, no abdominal pain and no shortness of breath. Patient Active Problem List   Diagnosis Code    Sleep apnea G47.30    Obesity (BMI 30.0-34. 9) E66.9    Family history of colon cancer Z80.0    Mixed hyperlipidemia E78.2    Essential hypertension I10    Vitamin D deficiency E55.9    Controlled type 2 diabetes mellitus without complication, without long-term current use of insulin (HCC) E11.9    Adenomatous colon polyp D12.6       Current Outpatient Medications:     metFORMIN (GLUCOPHAGE) 500 mg tablet, TAKE 1 TABLET BY MOUTH DAILY WITH SUPPER, Disp: 90 Tab, Rfl: 1    losartan-hydroCHLOROthiazide (HYZAAR) 100-12.5 mg per tablet, TAKE 1 TABLET BY MOUTH DAILY, Disp: 90 Tab, Rfl: 0    atorvastatin (LIPITOR) 80 mg tablet, Take 1 Tab by mouth daily. , Disp: 90 Tab, Rfl: 2    ergocalciferol (VITAMIN D2) 50,000 unit capsule, Take 1 Cap by mouth every seven (7) days. , Disp: 12 Cap, Rfl: 3    Allergies   Allergen Reactions    Penicillins Rash         Review of Systems   Respiratory: Negative for shortness of breath. Cardiovascular: Negative for chest pain and palpitations. Gastrointestinal: Negative for abdominal pain, blood in stool, constipation, diarrhea, heartburn, melena, nausea and vomiting. Genitourinary: Negative for dysuria and urgency. ? Left inguinal hernia, doesn't really bother him   Neurological: Negative for tingling. Visit Vitals  /90 (BP 1 Location: Right arm, BP Patient Position: Sitting)   Pulse 86   Temp 97.9 °F (36.6 °C) (Oral)   Resp 16   Ht 5' 8\" (1.727 m)   Wt 207 lb (93.9 kg)   SpO2 100%   BMI 31.47 kg/m²     Physical Exam   Constitutional: He is oriented to person, place, and time. He appears well-developed and well-nourished. HENT:   Head: Normocephalic.    Eyes: EOM are normal.   Neck: Neck supple. Cardiovascular: Normal rate, regular rhythm and normal heart sounds. Pulmonary/Chest: Effort normal and breath sounds normal.   Genitourinary:   Genitourinary Comments: Left inguinal hernia, easily reducible   Musculoskeletal: He exhibits no edema. Neurological: He is alert and oriented to person, place, and time. Skin: Skin is warm and dry. Psychiatric: He has a normal mood and affect. His behavior is normal.   Nursing note and vitals reviewed. ASSESSMENT and PLAN    ICD-10-CM ICD-9-CM    1. Controlled type 2 diabetes mellitus without complication, without long-term current use of insulin (HCC) E11.9 250.00 AMB POC HEMOGLOBIN A1C      HEMOGLOBIN A1C WITH EAG      MICROALBUMIN, UR, RAND W/ MICROALB/CREAT RATIO   2. Left inguinal hernia K40.90 550.90    3. History of Helicobacter pylori infection Z86.19 V12.09 H. PYLORI BREATH TEST   4. Encounter for immunization Z23 V03.89 INFLUENZA VIRUS VAC QUAD,SPLIT,PRESV FREE SYRINGE IM   5. Routine general medical examination at a health care facility Z00.00 V70.0 CBC WITH AUTOMATED DIFF      HEMOGLOBIN A1C WITH EAG      LIPID PANEL      METABOLIC PANEL, COMPREHENSIVE      URINALYSIS W/MICROSCOPIC      TSH 3RD GENERATION      PSA SCREENING (SCREENING)      MICROALBUMIN, UR, RAND W/ MICROALB/CREAT RATIO   Return for annual physical in July, sooner with any problems. Return for fasting lab a few days before the appointment. Report any increase in hernia symptoms. Advised about symptoms of incarceration, aware that surgical repair is indicated in the not too distant future  Further disposition pending lab results if indicated.     Addendum, 6/19/2019 the patient has called back requesting referral for general surgery evaluation of his left inguinal hernia

## 2018-11-21 LAB
H. PYLORI BREATH TEST: NEGATIVE
UREA BREATH TEST QL: NORMAL

## 2019-01-14 DIAGNOSIS — E78.2 MIXED HYPERLIPIDEMIA: ICD-10-CM

## 2019-01-14 RX ORDER — ATORVASTATIN CALCIUM 80 MG/1
TABLET, FILM COATED ORAL
Qty: 90 TAB | Refills: 0 | Status: SHIPPED | OUTPATIENT
Start: 2019-01-14 | End: 2019-07-25 | Stop reason: SDUPTHER

## 2019-07-25 DIAGNOSIS — E78.2 MIXED HYPERLIPIDEMIA: ICD-10-CM

## 2019-07-25 DIAGNOSIS — E55.9 VITAMIN D DEFICIENCY: ICD-10-CM

## 2019-07-25 RX ORDER — ATORVASTATIN CALCIUM 80 MG/1
TABLET, FILM COATED ORAL
Qty: 90 TAB | Refills: 0 | Status: SHIPPED | OUTPATIENT
Start: 2019-07-25 | End: 2019-08-01 | Stop reason: SDUPTHER

## 2019-07-25 RX ORDER — ERGOCALCIFEROL 1.25 MG/1
CAPSULE ORAL
Qty: 12 CAP | Refills: 0 | Status: SHIPPED | OUTPATIENT
Start: 2019-07-25 | End: 2019-08-01 | Stop reason: SDUPTHER

## 2019-07-25 NOTE — TELEPHONE ENCOUNTER
The patient was due for physical exam, follow-up and lab this month, please advised that we cannot provide any refills after this without those things taking place first

## 2019-07-29 DIAGNOSIS — I10 ESSENTIAL HYPERTENSION WITH GOAL BLOOD PRESSURE LESS THAN 130/80: ICD-10-CM

## 2019-07-29 RX ORDER — LOSARTAN POTASSIUM AND HYDROCHLOROTHIAZIDE 12.5; 1 MG/1; MG/1
TABLET ORAL
Qty: 90 TAB | Refills: 0 | Status: SHIPPED | OUTPATIENT
Start: 2019-07-29 | End: 2019-08-01 | Stop reason: SDUPTHER

## 2019-07-31 NOTE — PROGRESS NOTES
HISTORY OF PRESENT ILLNESS  Porsha Davies is a 46 y.o. male. Mr. Martín Balderas presents for an annual physical exam and follow-up of type 2 diabetes, hypertension, hyperlipidemia and vitamin D deficiency. He has not returned for fasting lab as requested. Physical   The history is provided by the patient and medical records. Pertinent negatives include no chest pain, no abdominal pain, no headaches and no shortness of breath. Diabetes   The history is provided by the patient and medical records. This is a chronic problem. Pertinent negatives include no chest pain, no abdominal pain, no headaches and no shortness of breath. Hypertension    The history is provided by the patient and medical records. This is a chronic problem. Pertinent negatives include no chest pain, no palpitations, no blurred vision, no headaches, no dizziness, no shortness of breath, no nausea and no vomiting. Cholesterol Problem   The history is provided by the patient and medical records. This is a chronic problem. Pertinent negatives include no chest pain, no abdominal pain, no headaches and no shortness of breath.      Mr#: [id number]      Past Medical History:   Diagnosis Date    Diabetes ([address] 2006    Hypercholesterolemia     Hypertension 2006    Sleep apnea        Past Surgical History:   Procedure Laterality Date    COLONOSCOPY N/A 3/31/2017    COLONOSCOPY w hot polypectomy performed by Kathryn Alanis MD at [address] [place] [address] HX HEMORRHOIDECTOMY  2/2014       Family History   Problem Relation Age of Onset    Cancer Sister         rectal    Cancer Maternal Uncle         colon    Diabetes Maternal Uncle     Cancer Maternal Grandfather         colon    Diabetes Maternal Grandfather     Cancer Paternal Grandfather         unknown type     Heart Disease Neg Hx     Hypertension Neg Hx        Allergies   Allergen Reactions    Penicillins Rash       Social History     Tobacco Use   Smoking Status Former Smoker   Smokeless Tobacco Former User    Quit date: 3/17/2015   Tobacco Comment    quit over a year ago        Social History     Substance and Sexual Activity   Alcohol Use Yes    Comment: occasionally        Health Maintenance Review:  Immunizations,  Tetanus - ~ 2 years ago  Influenza -11/19/2018  Pneumococcal -PPSV-23 7/31/2018  Colonoscopy: 3/31/2017 normal  ?PSA - Results for Jordy Young (MRN [id number]) as of 7/31/2019 07:23   Ref. Range 7/31/2018 08:20   Prostate Specific Ag Latest Ref Range: 0.0 - 4.0 ng/mL 1.1           Patient Active Problem List   Diagnosis Code    Sleep apnea G47.30    Obesity (BMI 30.0-34. 9) E66.9    Family history of colon cancer Z80.0    Mixed hyperlipidemia E78.2    Essential hypertension I10    Vitamin D deficiency E55.9    Controlled type 2 diabetes mellitus without complication, without long-term current use of insulin (HCC) E11.9    Adenomatous colon polyp D12.6         Current Outpatient Medications:     losartan-hydroCHLOROthiazide (HYZAAR) 100-12.5 mg per tablet, TAKE 1 TABLET BY MOUTH DAILY, Disp: 90 Tab, Rfl: 0    atorvastatin (LIPITOR) 80 mg tablet, TAKE 1 TABLET BY MOUTH DAILY, Disp: 90 Tab, Rfl: 0    ergocalciferol (ERGOCALCIFEROL) 50,000 unit capsule, TAKE 1 CAPSULE BY MOUTH EVERY 7 DAYS, Disp: 12 Cap, Rfl: 0    metFORMIN (GLUCOPHAGE) 500 mg tablet, TAKE 1 TABLET BY MOUTH DAILY WITH SUPPER, Disp: 90 Tab, Rfl: 1         Review of Systems   Constitutional: Negative for chills, fever and weight loss. HENT: Negative for congestion, hearing loss and sore throat. Eyes: Negative for blurred vision and double vision. Due for diabetic eye exam   Respiratory: Negative for cough, shortness of breath and wheezing. Cardiovascular: Negative for chest pain, palpitations and leg swelling. Gastrointestinal: Positive for blood in stool (occasional BRB on tissue). Negative for abdominal pain, constipation, diarrhea, heartburn, melena, nausea and vomiting.    Genitourinary: Negative for dysuria, frequency and urgency. Musculoskeletal: Negative for joint pain and myalgias. Skin: Negative for itching and rash. Neurological: Positive for sensory change (sensation of numbness right leg with prolonged standing-episodic). Negative for dizziness, tingling, focal weakness and headaches. Endo/Heme/Allergies: Negative for environmental allergies and polydipsia. Psychiatric/Behavioral: Negative for depression. The patient is not nervous/anxious and does not have insomnia. Visit Vitals  /68 (BP 1 Location: Left arm, BP Patient Position: Sitting)   Pulse 87   Temp 97.9 °F (36.6 °C) (Oral)   Resp 14   Ht 5' 8\" (1.727 m)   Wt 202 lb (91.6 kg)   SpO2 97%   BMI 30.71 kg/m²       Physical Exam   Constitutional: He is oriented to person, place, and time. He appears well-developed and well-nourished. HENT:   Head: Normocephalic. Right Ear: Tympanic membrane and ear canal normal.   Left Ear: Ear canal normal.   Mouth/Throat: Oropharynx is clear and moist.   Left tympanic membrane obscured by a thin piece of cerumen   Eyes: Pupils are equal, round, and reactive to light. Conjunctivae and EOM are normal.   Neck: Neck supple. Cardiovascular: Normal rate, regular rhythm, normal heart sounds and intact distal pulses. Pulmonary/Chest: Effort normal and breath sounds normal.   Abdominal: Soft. Bowel sounds are normal. There is no tenderness. Musculoskeletal: He exhibits no edema. Neurological: He is alert and oriented to person, place, and time. He has normal reflexes. Skin: Skin is warm and dry. Psychiatric: He has a normal mood and affect. His behavior is normal.   Nursing note and vitals reviewed.          Diabetic foot exam performed by Diana Dhillon MD     Measurement  Response Nurse Comment Physician Comment   Monofilament  R - normal sensation with micro filament  L - normal sensation with micro filament     Pulse DP R - 2+ (normal)  L - 2+ (normal)     Pulse TP R - 2+ (normal)  L - trace     Structural deformity R - None  L - None     Skin Integrity / Deformity R - None  L - None                 ASSESSMENT and PLAN    ICD-10-CM ICD-9-CM    1. Routine general medical examination at a health care facility Z00.00 V70.0    2. Controlled type 2 diabetes mellitus without complication, without long-term current use of insulin (HCC) E11.9 250.00 metFORMIN (GLUCOPHAGE) 500 mg tablet       DIABETES FOOT EXAM   3. Essential hypertension with goal blood pressure less than 130/80 I10 401.9 losartan-hydroCHLOROthiazide (HYZAAR) 100-12.5 mg per tablet   4. Mixed hyperlipidemia E78.2 272.2 atorvastatin (LIPITOR) 80 mg tablet   5. Vitamin D deficiency E55.9 268.9 ergocalciferol (ERGOCALCIFEROL) 50,000 unit capsule   6. Obesity (BMI 30.0-34. 9) E66.9 278.00    Lab today, further disposition pending lab results  Continue current medications  Avoid dietary starch and sugar and follow a program of regular aerobic exercise to work on weight management with progress to be evaluated at subsequent appointments  Schedule a diabetic eye exam, have results sent here.   Return for influenza immunization later this fall  Office follow-up in 6 months, A1c at follow-up

## 2019-08-01 ENCOUNTER — OFFICE VISIT (OUTPATIENT)
Dept: FAMILY MEDICINE CLINIC | Age: 54
End: 2019-08-01

## 2019-08-01 ENCOUNTER — HOSPITAL ENCOUNTER (OUTPATIENT)
Dept: LAB | Age: 54
Discharge: HOME | End: 2019-08-01
Payer: COMMERCIAL

## 2019-08-01 VITALS
DIASTOLIC BLOOD PRESSURE: 68 MMHG | HEIGHT: 68 IN | SYSTOLIC BLOOD PRESSURE: 118 MMHG | WEIGHT: 202 LBS | OXYGEN SATURATION: 97 % | HEART RATE: 87 BPM | TEMPERATURE: 97.9 F | RESPIRATION RATE: 14 BRPM | BODY MASS INDEX: 30.62 KG/M2

## 2019-08-01 DIAGNOSIS — Z00.00 ROUTINE GENERAL MEDICAL EXAMINATION AT A HEALTH CARE FACILITY: ICD-10-CM

## 2019-08-01 DIAGNOSIS — I10 ESSENTIAL HYPERTENSION WITH GOAL BLOOD PRESSURE LESS THAN 130/80: ICD-10-CM

## 2019-08-01 DIAGNOSIS — E11.9 CONTROLLED TYPE 2 DIABETES MELLITUS WITHOUT COMPLICATION, WITHOUT LONG-TERM CURRENT USE OF INSULIN (HCC): ICD-10-CM

## 2019-08-01 DIAGNOSIS — Z00.00 ROUTINE GENERAL MEDICAL EXAMINATION AT A HEALTH CARE FACILITY: Primary | ICD-10-CM

## 2019-08-01 DIAGNOSIS — E66.9 OBESITY (BMI 30.0-34.9): ICD-10-CM

## 2019-08-01 DIAGNOSIS — E55.9 VITAMIN D DEFICIENCY: ICD-10-CM

## 2019-08-01 DIAGNOSIS — E78.2 MIXED HYPERLIPIDEMIA: ICD-10-CM

## 2019-08-01 LAB
ALBUMIN SERPL-MCNC: 3.9 G/DL (ref 3.4–5)
ALBUMIN/GLOB SERPL: 1.3 {RATIO} (ref 0.8–1.7)
ALP SERPL-CCNC: 59 U/L (ref 45–117)
ALT SERPL-CCNC: 43 U/L (ref 16–61)
ANION GAP SERPL CALC-SCNC: 7 MMOL/L (ref 3–18)
APPEARANCE UR: CLEAR
AST SERPL-CCNC: 15 U/L (ref 10–38)
BACTERIA URNS QL MICRO: NEGATIVE /HPF
BASOPHILS # BLD: 0 K/UL (ref 0–0.1)
BASOPHILS NFR BLD: 0 % (ref 0–2)
BILIRUB SERPL-MCNC: 0.7 MG/DL (ref 0.2–1)
BILIRUB UR QL: NEGATIVE
BUN SERPL-MCNC: 14 MG/DL (ref 7–18)
BUN/CREAT SERPL: 12 (ref 12–20)
CALCIUM SERPL-MCNC: 8.6 MG/DL (ref 8.5–10.1)
CHLORIDE SERPL-SCNC: 104 MMOL/L (ref 100–111)
CHOLEST SERPL-MCNC: 112 MG/DL
CO2 SERPL-SCNC: 29 MMOL/L (ref 21–32)
COLOR UR: YELLOW
CREAT SERPL-MCNC: 1.16 MG/DL (ref 0.6–1.3)
CREAT UR-MCNC: 216 MG/DL (ref 30–125)
DIFFERENTIAL METHOD BLD: ABNORMAL
EOSINOPHIL # BLD: 0.1 K/UL (ref 0–0.4)
EOSINOPHIL NFR BLD: 2 % (ref 0–5)
EPITH CASTS URNS QL MICRO: NORMAL /LPF (ref 0–5)
ERYTHROCYTE [DISTWIDTH] IN BLOOD BY AUTOMATED COUNT: 14.3 % (ref 11.6–14.5)
EST. AVERAGE GLUCOSE BLD GHB EST-MCNC: 137 MG/DL
GLOBULIN SER CALC-MCNC: 2.9 G/DL (ref 2–4)
GLUCOSE SERPL-MCNC: 113 MG/DL (ref 74–99)
GLUCOSE UR STRIP.AUTO-MCNC: NEGATIVE MG/DL
HBA1C MFR BLD: 6.4 % (ref 4.2–5.6)
HCT VFR BLD AUTO: 40.9 % (ref 36–48)
HDLC SERPL-MCNC: 37 MG/DL (ref 40–60)
HDLC SERPL: 3 {RATIO} (ref 0–5)
HGB BLD-MCNC: 13.1 G/DL (ref 13–16)
HGB UR QL STRIP: NEGATIVE
KETONES UR QL STRIP.AUTO: NEGATIVE MG/DL
LDLC SERPL CALC-MCNC: 57.2 MG/DL (ref 0–100)
LEUKOCYTE ESTERASE UR QL STRIP.AUTO: NEGATIVE
LIPID PROFILE,FLP: ABNORMAL
LYMPHOCYTES # BLD: 1.8 K/UL (ref 0.9–3.6)
LYMPHOCYTES NFR BLD: 39 % (ref 21–52)
MCH RBC QN AUTO: 26.7 PG (ref 24–34)
MCHC RBC AUTO-ENTMCNC: 32 G/DL (ref 31–37)
MCV RBC AUTO: 83.3 FL (ref 74–97)
MICROALBUMIN UR-MCNC: 1.18 MG/DL (ref 0–3)
MICROALBUMIN/CREAT UR-RTO: 5 MG/G (ref 0–30)
MONOCYTES # BLD: 0.4 K/UL (ref 0.05–1.2)
MONOCYTES NFR BLD: 8 % (ref 3–10)
NEUTS SEG # BLD: 2.4 K/UL (ref 1.8–8)
NEUTS SEG NFR BLD: 51 % (ref 40–73)
NITRITE UR QL STRIP.AUTO: NEGATIVE
PH UR STRIP: 5 [PH] (ref 5–8)
PLATELET # BLD AUTO: 240 K/UL (ref 135–420)
PMV BLD AUTO: 12.4 FL (ref 9.2–11.8)
POTASSIUM SERPL-SCNC: 4 MMOL/L (ref 3.5–5.5)
PROT SERPL-MCNC: 6.8 G/DL (ref 6.4–8.2)
PROT UR STRIP-MCNC: NEGATIVE MG/DL
PSA SERPL-MCNC: 1.1 NG/ML (ref 0–4)
RBC # BLD AUTO: 4.91 M/UL (ref 4.7–5.5)
RBC #/AREA URNS HPF: NORMAL /HPF (ref 0–5)
SODIUM SERPL-SCNC: 140 MMOL/L (ref 136–145)
SP GR UR REFRACTOMETRY: 1.02 (ref 1–1.03)
TRIGL SERPL-MCNC: 89 MG/DL (ref ?–150)
TSH SERPL DL<=0.05 MIU/L-ACNC: 0.84 UIU/ML (ref 0.36–3.74)
UROBILINOGEN UR QL STRIP.AUTO: 0.2 EU/DL (ref 0.2–1)
VLDLC SERPL CALC-MCNC: 17.8 MG/DL
WBC # BLD AUTO: 4.7 K/UL (ref 4.6–13.2)
WBC URNS QL MICRO: NORMAL /HPF (ref 0–4)

## 2019-08-01 PROCEDURE — 85025 COMPLETE CBC W/AUTO DIFF WBC: CPT

## 2019-08-01 PROCEDURE — 80053 COMPREHEN METABOLIC PANEL: CPT

## 2019-08-01 PROCEDURE — 84153 ASSAY OF PSA TOTAL: CPT

## 2019-08-01 PROCEDURE — 80061 LIPID PANEL: CPT

## 2019-08-01 PROCEDURE — 84443 ASSAY THYROID STIM HORMONE: CPT

## 2019-08-01 PROCEDURE — 82043 UR ALBUMIN QUANTITATIVE: CPT

## 2019-08-01 PROCEDURE — 83036 HEMOGLOBIN GLYCOSYLATED A1C: CPT

## 2019-08-01 PROCEDURE — 81001 URINALYSIS AUTO W/SCOPE: CPT

## 2019-08-01 RX ORDER — LOSARTAN POTASSIUM AND HYDROCHLOROTHIAZIDE 12.5; 1 MG/1; MG/1
TABLET ORAL
Qty: 90 TAB | Refills: 3 | Status: SHIPPED | OUTPATIENT
Start: 2019-08-01 | End: 2021-02-05 | Stop reason: SDUPTHER

## 2019-08-01 RX ORDER — ERGOCALCIFEROL 1.25 MG/1
CAPSULE ORAL
Qty: 12 CAP | Refills: 3 | Status: SHIPPED | OUTPATIENT
Start: 2019-08-01 | End: 2021-03-02 | Stop reason: SDUPTHER

## 2019-08-01 RX ORDER — ATORVASTATIN CALCIUM 80 MG/1
TABLET, FILM COATED ORAL
Qty: 90 TAB | Refills: 3 | Status: SHIPPED | OUTPATIENT
Start: 2019-08-01 | End: 2021-02-05 | Stop reason: SDUPTHER

## 2019-08-01 RX ORDER — METFORMIN HYDROCHLORIDE 500 MG/1
TABLET ORAL
Qty: 90 TAB | Refills: 3 | Status: SHIPPED | OUTPATIENT
Start: 2019-08-01 | End: 2021-02-05 | Stop reason: SDUPTHER

## 2019-08-01 NOTE — PROGRESS NOTES
Coleen Cervantes is a 46 y.o. male (: 1965) presenting to address:    Chief Complaint   Patient presents with    Physical       Vitals:    19 0801   BP: 118/68   Pulse: 87   Resp: 14   Temp: 97.9 °F (36.6 °C)   TempSrc: Oral   SpO2: 97%   Weight: 202 lb (91.6 kg)   Height: 5' 8\" (1.727 m)   PainSc:   0 - No pain       Hearing/Vision:   No exam data present    Learning Assessment:     Learning Assessment 2016   PRIMARY LEARNER Patient   HIGHEST LEVEL OF EDUCATION - PRIMARY LEARNER  2 YEARS OF COLLEGE   BARRIERS PRIMARY LEARNER NONE   CO-LEARNER CAREGIVER No   PRIMARY LANGUAGE ENGLISH   LEARNER PREFERENCE PRIMARY READING   ANSWERED BY patient   RELATIONSHIP SELF     Depression Screening:     3 most recent PHQ Screens 2019   Little interest or pleasure in doing things Not at all   Feeling down, depressed, irritable, or hopeless Not at all   Total Score PHQ 2 0     Fall Risk Assessment:     Fall Risk Assessment, last 12 mths 2019   Able to walk? Yes   Fall in past 12 months? No     Abuse Screening:     Abuse Screening Questionnaire 2019   Do you ever feel afraid of your partner? N   Are you in a relationship with someone who physically or mentally threatens you? N   Is it safe for you to go home? Y     Coordination of Care Questionaire:   1. Have you been to the ER, urgent care clinic since your last visit? Hospitalized since your last visit? NO    2. Have you seen or consulted any other health care providers outside of the [address] [place] [address] since your last visit? Include any pap smears or colon screening. YES, general surgeon for hernia    Advanced Directive:   1. Do you have an Advanced Directive? NO    2. Would you like information on Advanced Directives?  NO

## 2019-08-01 NOTE — PATIENT INSTRUCTIONS
Lab today, further disposition pending lab results Continue current medications Avoid dietary starch and sugar and follow a program of regular aerobic exercise to work on weight management with progress to be evaluated at subsequent appointments Schedule a diabetic eye exam, have results sent here. Return for influenza immunization later this fall Office follow-up in 6 months, A1c at follow-up Well Visit, Men 48 to 72: Care Instructions Your Care Instructions Physical exams can help you stay healthy. Your doctor has checked your overall health and may have suggested ways to take good care of yourself. He or she also may have recommended tests. At home, you can help prevent illness with healthy eating, regular exercise, and other steps. Follow-up care is a key part of your treatment and safety. Be sure to make and go to all appointments, and call your doctor if you are having problems. It's also a good idea to know your test results and keep a list of the medicines you take. How can you care for yourself at home? · Reach and stay at a healthy weight. This will lower your risk for many problems, such as obesity, diabetes, heart disease, and high blood pressure. · Get at least 30 minutes of exercise on most days of the week. Walking is a good choice. You also may want to do other activities, such as running, swimming, cycling, or playing tennis or team sports. · Do not smoke. Smoking can make health problems worse. If you need help quitting, talk to your doctor about stop-smoking programs and medicines. These can increase your chances of quitting for good. · Protect your skin from too much sun. When you're outdoors from 10 a.m. to 4 p.m., stay in the shade or cover up with clothing and a hat with a wide brim. Wear sunglasses that block UV rays. Even when it's cloudy, put broad-spectrum sunscreen (SPF 30 or higher) on any exposed skin. · See a dentist one or two times a year for checkups and to have your teeth cleaned. · Wear a seat belt in the car. Follow your doctor's advice about when to have certain tests. These tests can spot problems early. · Cholesterol. Your doctor will tell you how often to have this done based on your overall health and other things that can increase your risk for heart attack and stroke. · Blood pressure. Have your blood pressure checked during a routine doctor visit. Your doctor will tell you how often to check your blood pressure based on your age, your blood pressure results, and other factors. · Prostate exam. Talk to your doctor about whether you should have a blood test (called a PSA test) for prostate cancer. Experts recommend that you discuss the benefits and risks of the test with your doctor before you decide whether to have this test. 
· Diabetes. Ask your doctor whether you should have tests for diabetes. · Vision. Some experts recommend that you have yearly exams for glaucoma and other age-related eye problems starting at age 48. · Hearing. Tell your doctor if you notice any change in your hearing. You can have tests to find out how well you hear. · Colorectal cancer. Your risk for colorectal cancer gets higher as you get older. Some experts say that adults should start regular screening at age 48 and stop at age 76. Others say to start before age 48 or continue after age 76. Talk with your doctor about your risk and when to start and stop screening. · Heart attack and stroke risk. At least every 4 to 6 years, you should have your risk for heart attack and stroke assessed. Your doctor uses factors such as your age, blood pressure, cholesterol, and whether you smoke or have diabetes to show what your risk for a heart attack or stroke is over the next 10 years. · Abdominal aortic aneurysm.  Ask your doctor whether you should have a test to check for an aneurysm. You may need a test if you ever smoked or if your parent, brother, sister, or child has had an aneurysm. When should you call for help? Watch closely for changes in your health, and be sure to contact your doctor if you have any problems or symptoms that concern you. Where can you learn more? Go to http://elin-mark.info/. Enter B158 in the search box to learn more about \"Well Visit, Men 48 to 72: Care Instructions. \" Current as of: December 13, 2018 Content Version: 12.1 © 7461-5394 [email or web address] Care instructions adapted under license by [employer] (which disclaims liability or warranty for this information). If you have questions about a medical condition or this instruction, always ask your healthcare professional. [address] any warranty or liability for your use of this information. Learning About Low-Carbohydrate Diets for Weight Loss What is a low-carbohydrate diet? Low-carb diets avoid foods that are high in carbohydrate. These high-carb foods include pasta, bread, rice, cereal, fruits, and starchy vegetables. Instead, these diets usually have you eat foods that are high in fat and protein. Many people lose weight quickly on a low-carb diet. But the early weight loss is water. People on this diet often gain the weight back after they start eating carbs again. Not all diet plans are safe or work well. A lot of the evidence shows that low-carb diets aren't healthy. That's because these diets often don't include healthy foods like fruits and vegetables. Losing weight safely means balancing protein, fat, and carbs with every meal and snack. And low-carb diets don't always provide the vitamins, minerals, and fiber you need. If you have a serious medical condition, talk to your doctor before you try any diet.  These conditions include kidney disease, heart disease, type 2 diabetes, high cholesterol, and high blood pressure. If you are pregnant, it may not be safe for your baby if you are on a low-carb diet. How can you lose weight safely? You might have heard that a diet plan helped another person lose weight. But that doesn't mean that it will work for you. It is very hard to stay on a diet that includes lots of big changes in your eating habits. If you want to get to a healthy weight and stay there, making healthy lifestyle changes will often work better than dieting. These steps can help. · Make a plan for change. Work with your doctor to create a plan that is right for you. · See a dietitian. He or she can show you how to make healthy changes in your eating habits. · Manage stress. If you have a lot of stress in your life, it can be hard to focus on making healthy changes to your daily habits. · Track your food and activity. You are likely to do better at losing weight if you keep track of what you eat and what you do. Follow-up care is a key part of your treatment and safety. Be sure to make and go to all appointments, and call your doctor if you are having problems. It's also a good idea to know your test results and keep a list of the medicines you take. Where can you learn more? Go to http://elin-mark.info/. Enter A121 in the search box to learn more about \"Learning About Low-Carbohydrate Diets for Weight Loss. \" Current as of: November 7, 2018 Content Version: 12.1 © 2972-2290 [employer]. Care instructions adapted under license by [employer] (which disclaims liability or warranty for this information). If you have questions about a medical condition or this instruction, always ask your healthcare professional. [address] any warranty or liability for your use of this information.

## 2019-08-02 NOTE — PROGRESS NOTES
Called cell to informed however voicemail full, called home number and left message as well as sent [employer] message informing.

## 2019-08-02 NOTE — PROGRESS NOTES
Please advise that lab results are satisfactory on current medications which should be continued and actually show some improvement in control of diabetes. Please remind him to schedule a follow-up appointment in 6 months.

## 2021-02-05 DIAGNOSIS — E55.9 VITAMIN D DEFICIENCY: ICD-10-CM

## 2021-02-05 DIAGNOSIS — E11.9 CONTROLLED TYPE 2 DIABETES MELLITUS WITHOUT COMPLICATION, WITHOUT LONG-TERM CURRENT USE OF INSULIN (HCC): ICD-10-CM

## 2021-02-05 DIAGNOSIS — I10 ESSENTIAL HYPERTENSION: ICD-10-CM

## 2021-02-05 DIAGNOSIS — Z00.00 ROUTINE GENERAL MEDICAL EXAMINATION AT A HEALTH CARE FACILITY: Primary | ICD-10-CM

## 2021-02-05 DIAGNOSIS — E78.2 MIXED HYPERLIPIDEMIA: ICD-10-CM

## 2021-03-01 ENCOUNTER — APPOINTMENT (OUTPATIENT)
Dept: FAMILY MEDICINE CLINIC | Age: 56
End: 2021-03-01

## 2021-03-01 ENCOUNTER — HOSPITAL ENCOUNTER (OUTPATIENT)
Dept: LAB | Age: 56
Discharge: HOME | End: 2021-03-01
Payer: COMMERCIAL

## 2021-03-01 ENCOUNTER — OFFICE VISIT (OUTPATIENT)
Dept: FAMILY MEDICINE CLINIC | Age: 56
End: 2021-03-01
Payer: COMMERCIAL

## 2021-03-01 VITALS
RESPIRATION RATE: 14 BRPM | HEIGHT: 68 IN | SYSTOLIC BLOOD PRESSURE: 136 MMHG | DIASTOLIC BLOOD PRESSURE: 98 MMHG | WEIGHT: 195 LBS | BODY MASS INDEX: 29.55 KG/M2 | HEART RATE: 97 BPM | TEMPERATURE: 97.1 F | OXYGEN SATURATION: 99 %

## 2021-03-01 DIAGNOSIS — I10 ESSENTIAL HYPERTENSION WITH GOAL BLOOD PRESSURE LESS THAN 130/80: ICD-10-CM

## 2021-03-01 DIAGNOSIS — I10 ESSENTIAL HYPERTENSION: ICD-10-CM

## 2021-03-01 DIAGNOSIS — E78.2 MIXED HYPERLIPIDEMIA: ICD-10-CM

## 2021-03-01 DIAGNOSIS — E11.9 CONTROLLED TYPE 2 DIABETES MELLITUS WITHOUT COMPLICATION, WITHOUT LONG-TERM CURRENT USE OF INSULIN (HCC): Primary | ICD-10-CM

## 2021-03-01 DIAGNOSIS — Z00.00 ROUTINE GENERAL MEDICAL EXAMINATION AT A HEALTH CARE FACILITY: ICD-10-CM

## 2021-03-01 DIAGNOSIS — Z80.0 FAMILY HISTORY OF COLON CANCER: ICD-10-CM

## 2021-03-01 DIAGNOSIS — E11.9 CONTROLLED TYPE 2 DIABETES MELLITUS WITHOUT COMPLICATION, WITHOUT LONG-TERM CURRENT USE OF INSULIN (HCC): ICD-10-CM

## 2021-03-01 DIAGNOSIS — E55.9 VITAMIN D DEFICIENCY: ICD-10-CM

## 2021-03-01 DIAGNOSIS — E66.9 OBESITY (BMI 30.0-34.9): ICD-10-CM

## 2021-03-01 DIAGNOSIS — D12.6 ADENOMATOUS POLYP OF COLON, UNSPECIFIED PART OF COLON: ICD-10-CM

## 2021-03-01 LAB
25(OH)D3 SERPL-MCNC: 13.5 NG/ML (ref 30–100)
ALBUMIN SERPL-MCNC: 4.1 G/DL (ref 3.4–5)
ALBUMIN/GLOB SERPL: 1.3 {RATIO} (ref 0.8–1.7)
ALP SERPL-CCNC: 74 U/L (ref 45–117)
ALT SERPL-CCNC: 30 U/L (ref 16–61)
ANION GAP SERPL CALC-SCNC: 5 MMOL/L (ref 3–18)
APPEARANCE UR: CLEAR
AST SERPL-CCNC: 13 U/L (ref 10–38)
BASOPHILS # BLD: 0 K/UL (ref 0–0.1)
BASOPHILS NFR BLD: 1 % (ref 0–2)
BILIRUB SERPL-MCNC: 1 MG/DL (ref 0.2–1)
BILIRUB UR QL: NEGATIVE
BUN SERPL-MCNC: 13 MG/DL (ref 7–18)
BUN/CREAT SERPL: 14 (ref 12–20)
CALCIUM SERPL-MCNC: 9 MG/DL (ref 8.5–10.1)
CHLORIDE SERPL-SCNC: 102 MMOL/L (ref 100–111)
CHOLEST SERPL-MCNC: 186 MG/DL
CO2 SERPL-SCNC: 32 MMOL/L (ref 21–32)
COLOR UR: YELLOW
CREAT SERPL-MCNC: 0.92 MG/DL (ref 0.6–1.3)
CREAT UR-MCNC: 100 MG/DL (ref 30–125)
DIFFERENTIAL METHOD BLD: ABNORMAL
EOSINOPHIL # BLD: 0.1 K/UL (ref 0–0.4)
EOSINOPHIL NFR BLD: 1 % (ref 0–5)
ERYTHROCYTE [DISTWIDTH] IN BLOOD BY AUTOMATED COUNT: 13.9 % (ref 11.6–14.5)
EST. AVERAGE GLUCOSE BLD GHB EST-MCNC: 128 MG/DL
GLOBULIN SER CALC-MCNC: 3.1 G/DL (ref 2–4)
GLUCOSE SERPL-MCNC: 90 MG/DL (ref 74–99)
GLUCOSE UR STRIP.AUTO-MCNC: NEGATIVE MG/DL
HBA1C MFR BLD: 6.1 % (ref 4.2–5.6)
HCT VFR BLD AUTO: 40.1 % (ref 36–48)
HDLC SERPL-MCNC: 44 MG/DL (ref 40–60)
HDLC SERPL: 4.2 {RATIO} (ref 0–5)
HGB BLD-MCNC: 13.2 G/DL (ref 13–16)
HGB UR QL STRIP: NEGATIVE
KETONES UR QL STRIP.AUTO: NEGATIVE MG/DL
LDLC SERPL CALC-MCNC: 121.8 MG/DL (ref 0–100)
LEUKOCYTE ESTERASE UR QL STRIP.AUTO: NEGATIVE
LIPID PROFILE,FLP: ABNORMAL
LYMPHOCYTES # BLD: 1.8 K/UL (ref 0.9–3.6)
LYMPHOCYTES NFR BLD: 42 % (ref 21–52)
MCH RBC QN AUTO: 26.8 PG (ref 24–34)
MCHC RBC AUTO-ENTMCNC: 32.9 G/DL (ref 31–37)
MCV RBC AUTO: 81.3 FL (ref 74–97)
MICROALBUMIN UR-MCNC: 0.75 MG/DL (ref 0–3)
MICROALBUMIN/CREAT UR-RTO: 8 MG/G (ref 0–30)
MONOCYTES # BLD: 0.4 K/UL (ref 0.05–1.2)
MONOCYTES NFR BLD: 9 % (ref 3–10)
NEUTS SEG # BLD: 2.1 K/UL (ref 1.8–8)
NEUTS SEG NFR BLD: 47 % (ref 40–73)
NITRITE UR QL STRIP.AUTO: NEGATIVE
PH UR STRIP: 7 [PH] (ref 5–8)
PLATELET # BLD AUTO: 219 K/UL (ref 135–420)
PMV BLD AUTO: 11.8 FL (ref 9.2–11.8)
POTASSIUM SERPL-SCNC: 4 MMOL/L (ref 3.5–5.5)
PROT SERPL-MCNC: 7.2 G/DL (ref 6.4–8.2)
PROT UR STRIP-MCNC: NEGATIVE MG/DL
PSA SERPL-MCNC: 1.3 NG/ML (ref 0–4)
RBC # BLD AUTO: 4.93 M/UL (ref 4.7–5.5)
SODIUM SERPL-SCNC: 139 MMOL/L (ref 136–145)
SP GR UR REFRACTOMETRY: 1.01 (ref 1–1.03)
TRIGL SERPL-MCNC: 101 MG/DL (ref ?–150)
TSH SERPL DL<=0.05 MIU/L-ACNC: 0.86 UIU/ML (ref 0.36–3.74)
UROBILINOGEN UR QL STRIP.AUTO: 0.2 EU/DL (ref 0.2–1)
VLDLC SERPL CALC-MCNC: 20.2 MG/DL
WBC # BLD AUTO: 4.4 K/UL (ref 4.6–13.2)

## 2021-03-01 PROCEDURE — 80053 COMPREHEN METABOLIC PANEL: CPT

## 2021-03-01 PROCEDURE — 81003 URINALYSIS AUTO W/O SCOPE: CPT

## 2021-03-01 PROCEDURE — 80061 LIPID PANEL: CPT

## 2021-03-01 PROCEDURE — 83036 HEMOGLOBIN GLYCOSYLATED A1C: CPT

## 2021-03-01 PROCEDURE — 84443 ASSAY THYROID STIM HORMONE: CPT

## 2021-03-01 PROCEDURE — 84153 ASSAY OF PSA TOTAL: CPT

## 2021-03-01 PROCEDURE — 82043 UR ALBUMIN QUANTITATIVE: CPT

## 2021-03-01 PROCEDURE — 85025 COMPLETE CBC W/AUTO DIFF WBC: CPT

## 2021-03-01 PROCEDURE — 99396 PREV VISIT EST AGE 40-64: CPT | Performed by: FAMILY MEDICINE

## 2021-03-01 PROCEDURE — 36415 COLL VENOUS BLD VENIPUNCTURE: CPT

## 2021-03-01 PROCEDURE — 82306 VITAMIN D 25 HYDROXY: CPT

## 2021-03-01 NOTE — PROGRESS NOTES
HISTORY OF PRESENT ILLNESS  Suki Lamb is a 63 y.o. male. He presents for assessment, preventative care and follow-up with a history of type 2 diabetes, hypertension, hyperlipidemia, vitamin D deficiency and obesity with a family history of colon cancer and a personal history of tubular adenomas of the colon. Today he reports that he ran out of medication for about 6 months and has just recently refilled his prescriptions and has been taking them for about a week. Mr#: [id number]      Past Medical History:   Diagnosis Date    Diabetes ([address] 2006    Hypercholesterolemia     Hypertension 2006    Sleep apnea        Past Surgical History:   Procedure Laterality Date    COLONOSCOPY N/A 3/31/2017    COLONOSCOPY w hot polypectomy performed by Kaity Sun MD at [address] [place] [address] HX HEMORRHOIDECTOMY  2/2014       Family History   Problem Relation Age of Onset    Cancer Sister         rectal    Cancer Maternal Uncle         colon    Diabetes Maternal Uncle     Cancer Maternal Grandfather         colon    Diabetes Maternal Grandfather     Cancer Paternal Grandfather         unknown type     Heart Disease Neg Hx     Hypertension Neg Hx        Allergies   Allergen Reactions    Penicillins Rash       Social History     Tobacco Use   Smoking Status Former Smoker   Smokeless Tobacco Former User    Quit date: 3/17/2015   Tobacco Comment    quit over a year ago        Social History     Substance and Sexual Activity   Alcohol Use Yes    Comment: occasionally            Patient Active Problem List   Diagnosis Code    Sleep apnea G47.30    Obesity (BMI 30.0-34. 9) E66.9    Family history of colon cancer Z80.0    Mixed hyperlipidemia E78.2    Essential hypertension I10    Vitamin D deficiency E55.9    Controlled type 2 diabetes mellitus without complication, without long-term current use of insulin (HCC) E11.9    Adenomatous colon polyp D12.6         Current Outpatient Medications:     metFORMIN (GLUCOPHAGE) 500 mg tablet, TAKE 1 TABLET BY MOUTH DAILY WITH SUPPER, Disp: 90 Tab, Rfl: 0    atorvastatin (LIPITOR) 80 mg tablet, TAKE 1 TABLET BY MOUTH DAILY, Disp: 90 Tab, Rfl: 0    losartan-hydroCHLOROthiazide (HYZAAR) 100-12.5 mg per tablet, TAKE 1 TABLET BY MOUTH DAILY, Disp: 90 Tab, Rfl: 0    ergocalciferol (ERGOCALCIFEROL) 50,000 unit capsule, TAKE 1 CAPSULE BY MOUTH EVERY 7 DAYS, Disp: 12 Cap, Rfl: 3       Review of Systems   Constitutional: Negative for chills, fever and weight loss. HENT: Negative for congestion, ear pain, hearing loss and sore throat. Eyes: Negative for blurred vision and double vision. Respiratory: Negative for cough, shortness of breath and wheezing. Cardiovascular: Negative for chest pain, palpitations and leg swelling. Gastrointestinal: Negative for abdominal pain, blood in stool, constipation, diarrhea, heartburn, melena, nausea and vomiting. Genitourinary: Negative for dysuria, frequency and urgency. Musculoskeletal: Positive for back pain. Negative for joint pain and myalgias. Skin: Negative for itching and rash. Neurological: Positive for sensory change (numbness left leg). Negative for dizziness, tingling, focal weakness and headaches. Endo/Heme/Allergies: Negative for environmental allergies. Psychiatric/Behavioral: Negative for depression. The patient is not nervous/anxious and does not have insomnia. Visit Vitals  BP (!) 142/88 (BP 1 Location: Left upper arm, BP Patient Position: Sitting, BP Cuff Size: Adult)   Pulse 97   Temp 97.1 °F (36.2 °C) (Temporal)   Resp 14   Ht 5' 8\" (1.727 m)   Wt 195 lb (88.5 kg)   SpO2 99%   BMI 29.65 kg/m²       Physical Exam  Vitals signs and nursing note reviewed. Constitutional:       General: He is not in acute distress. Appearance: Normal appearance. He is obese. He is not ill-appearing. HENT:      Head: Normocephalic.       Right Ear: Tympanic membrane, ear canal and external ear normal.      Left Ear: Tympanic membrane, ear canal and external ear normal.   Eyes:      Extraocular Movements: Extraocular movements intact. Conjunctiva/sclera: Conjunctivae normal.      Pupils: Pupils are equal, round, and reactive to light. Neck:      Musculoskeletal: Neck supple. Vascular: No carotid bruit. Cardiovascular:      Rate and Rhythm: Normal rate and regular rhythm. Heart sounds: Normal heart sounds. Pulmonary:      Effort: Pulmonary effort is normal.      Breath sounds: Normal breath sounds. Abdominal:      Palpations: Abdomen is soft. Tenderness: There is no abdominal tenderness. Musculoskeletal:         General: No deformity. Right lower leg: No edema. Left lower leg: No edema. Skin:     General: Skin is warm and dry. Neurological:      General: No focal deficit present. Mental Status: He is alert and oriented to person, place, and time. Psychiatric:         Mood and Affect: Mood normal.         Behavior: Behavior normal.            Diabetic foot exam performed by Dioni Poe MD     Measurement  Response Nurse Comment Physician Comment   Monofilament  R - normal sensation with micro filament  L - normal sensation with micro filament     Pulse DP R - absent  L - 1+ (weak)     Pulse TP R - absent  L - absent     Structural deformity R - None  L - None     Skin Integrity / Deformity R - None  L - None                       ASSESSMENT and PLAN    ICD-10-CM ICD-9-CM    1. Controlled type 2 diabetes mellitus without complication, without long-term current use of insulin (HCC)  E11.9 250.00  DIABETES FOOT EXAM   2. Essential hypertension with goal blood pressure less than 130/80  I10 401.9    3. Mixed hyperlipidemia  E78.2 272.2    4. Vitamin D deficiency  E55.9 268.9    5. Obesity (BMI 30.0-34. 9)  E66.9 278.00    6. Family history of colon cancer  Z80.0 V16.0    7.  Adenomatous polyp of colon, unspecified part of colon  D12.6 211.3    Health maintenance:      COVID-19 immunization-added to COVID-19 immunization appointment last  Tdap immunization-deferred  Shingrix immunization-deferred    Lab studies ordered, further disposition pending lab results if indicated  Continue current medications pending lab results, take all medications as prescribed  Avoid dietary salt, starch and sugar and follow program of regular aerobic exercise  Will determine follow-up interval based on lab results    Erwin Moralez MD      PLEASE NOTE:   This document has been produced using voice recognition software. Unrecognized errors in transcription may be present.

## 2021-03-01 NOTE — PROGRESS NOTES
Monica Cronin presents today for   Chief Complaint   Patient presents with    Follow-up       Is someone accompanying this pt? no    Is the patient using any DME equipment during OV? no    Depression Screening:  3 most recent PHQ Screens 3/1/2021   Nataly Brunson 35 Not Done Patient Decline   Little interest or pleasure in doing things Not at all   Feeling down, depressed, irritable, or hopeless Not at all   Total Score PHQ 2 0       Learning Assessment:  Learning Assessment 5/17/2016   PRIMARY LEARNER Patient   HIGHEST LEVEL OF EDUCATION - PRIMARY LEARNER  [facility] PRIMARY LEARNER NONE   CO-LEARNER CAREGIVER No   PRIMARY LANGUAGE ENGLISH   LEARNER PREFERENCE PRIMARY READING   ANSWERED BY patient   RELATIONSHIP SELF       Fall Risk  Fall Risk Assessment, last 12 mths 3/1/2021   Able to walk? Yes   Fall in past 12 months? 0   Do you feel unsteady? 0   Are you worried about falling 0       Travel Screening:    Travel Screening     Question   Response    In the last month, have you been in contact with someone who was confirmed or suspected to have Coronavirus / COVID-19? No / Unsure    Have you had a COVID-19 viral test in the last 14 days? No    Do you have any of the following new or worsening symptoms? None of these    Have you traveled internationally in the last month? No      Travel History   Travel since 02/01/21     No documented travel since 02/01/21          Health Maintenance reviewed and discussed and ordered per Provider. Health Maintenance Due   Topic Date Due    COVID-19 Vaccine (1 of 2) 10/30/1980    DTaP/Tdap/Td series (1 - Tdap) 10/30/1985    Shingrix Vaccine Age 50> (1 of 2) 10/30/2014    Foot Exam Q1  08/01/2020    A1C test (Diabetic or Prediabetic)  08/01/2020    MICROALBUMIN Q1  08/01/2020    Lipid Screen  08/01/2020   . Coordination of Care:  1. Have you been to the ER, urgent care clinic since your last visit? Hospitalized since your last visit? No     2.  Have you seen or consulted any other health care providers outside of the [address] [place] [address] since your last visit? Include any pap smears or colon screening.  no

## 2021-03-01 NOTE — PATIENT INSTRUCTIONS
Lab studies ordered, further disposition pending lab results if indicated Continue current medications pending lab results, take all medications as prescribed Avoid dietary salt, starch and sugar and follow program of regular aerobic exercise Will determine follow-up interval based on lab results

## 2021-03-02 DIAGNOSIS — E55.9 VITAMIN D DEFICIENCY: ICD-10-CM

## 2021-03-02 DIAGNOSIS — E55.9 VITAMIN D DEFICIENCY: Primary | ICD-10-CM

## 2021-03-02 DIAGNOSIS — E11.9 CONTROLLED TYPE 2 DIABETES MELLITUS WITHOUT COMPLICATION, WITHOUT LONG-TERM CURRENT USE OF INSULIN (HCC): Primary | ICD-10-CM

## 2021-03-02 DIAGNOSIS — E78.2 MIXED HYPERLIPIDEMIA: ICD-10-CM

## 2021-03-02 RX ORDER — ERGOCALCIFEROL 1.25 MG/1
CAPSULE ORAL
Qty: 12 CAP | Refills: 3 | Status: SHIPPED | OUTPATIENT
Start: 2021-03-02 | End: 2021-09-07 | Stop reason: SDUPTHER

## 2021-03-02 NOTE — PROGRESS NOTES
Please advise that lab results indicate it would be reasonable for him to continue his current medications but take them every day as prescribed. His vitamin D though is very low and he should resume ergocalciferol 50,000 international units weekly. A prescription has been sent to his pharmacy. Please assist him with scheduling a follow-up office visit in 3 months.

## 2021-03-02 NOTE — PROGRESS NOTES
Labs prior to the appointment would be a good idea, please schedule him a lab appointment and the lab orders will be entered

## 2021-03-02 NOTE — PROGRESS NOTES
Informed pt of lab results and treatment plan; pt scheduled 3 month f/u; wants to know if he needs to complete labs before that appt?

## 2021-05-07 DIAGNOSIS — E78.2 MIXED HYPERLIPIDEMIA: ICD-10-CM

## 2021-05-07 DIAGNOSIS — I10 ESSENTIAL HYPERTENSION WITH GOAL BLOOD PRESSURE LESS THAN 130/80: ICD-10-CM

## 2021-05-07 DIAGNOSIS — E11.9 CONTROLLED TYPE 2 DIABETES MELLITUS WITHOUT COMPLICATION, WITHOUT LONG-TERM CURRENT USE OF INSULIN (HCC): ICD-10-CM

## 2021-05-08 RX ORDER — METFORMIN HYDROCHLORIDE 500 MG/1
TABLET ORAL
Qty: 90 TAB | Refills: 0 | Status: SHIPPED | OUTPATIENT
Start: 2021-05-08 | End: 2021-09-07 | Stop reason: SDUPTHER

## 2021-05-08 RX ORDER — ATORVASTATIN CALCIUM 80 MG/1
TABLET, FILM COATED ORAL
Qty: 90 TAB | Refills: 0 | Status: SHIPPED | OUTPATIENT
Start: 2021-05-08 | End: 2021-09-07 | Stop reason: SDUPTHER

## 2021-05-08 RX ORDER — LOSARTAN POTASSIUM AND HYDROCHLOROTHIAZIDE 12.5; 1 MG/1; MG/1
TABLET ORAL
Qty: 90 TAB | Refills: 0 | Status: SHIPPED | OUTPATIENT
Start: 2021-05-08 | End: 2021-09-07 | Stop reason: SDUPTHER

## 2021-05-28 ENCOUNTER — APPOINTMENT (OUTPATIENT)
Dept: FAMILY MEDICINE CLINIC | Age: 56
End: 2021-05-28

## 2021-05-28 DIAGNOSIS — E78.2 MIXED HYPERLIPIDEMIA: ICD-10-CM

## 2021-05-28 DIAGNOSIS — E11.9 CONTROLLED TYPE 2 DIABETES MELLITUS WITHOUT COMPLICATION, WITHOUT LONG-TERM CURRENT USE OF INSULIN (HCC): ICD-10-CM

## 2021-05-28 DIAGNOSIS — E55.9 VITAMIN D DEFICIENCY: ICD-10-CM

## 2021-05-29 LAB
25(OH)D3+25(OH)D2 SERPL-MCNC: 42.9 NG/ML (ref 30–100)
CHOLEST SERPL-MCNC: 144 MG/DL (ref 100–199)
EST. AVERAGE GLUCOSE BLD GHB EST-MCNC: 143 MG/DL
HBA1C MFR BLD: 6.6 % (ref 4.8–5.6)
HDLC SERPL-MCNC: 38 MG/DL
IMP & REVIEW OF LAB RESULTS: NORMAL
LDLC SERPL CALC-MCNC: 87 MG/DL (ref 0–99)
TRIGL SERPL-MCNC: 105 MG/DL (ref 0–149)
VLDLC SERPL CALC-MCNC: 19 MG/DL (ref 5–40)

## 2021-06-02 ENCOUNTER — OFFICE VISIT (OUTPATIENT)
Dept: FAMILY MEDICINE CLINIC | Age: 56
End: 2021-06-02
Payer: COMMERCIAL

## 2021-06-02 VITALS
BODY MASS INDEX: 29.07 KG/M2 | WEIGHT: 191.8 LBS | HEART RATE: 103 BPM | HEIGHT: 68 IN | SYSTOLIC BLOOD PRESSURE: 138 MMHG | DIASTOLIC BLOOD PRESSURE: 90 MMHG | TEMPERATURE: 97.2 F | RESPIRATION RATE: 15 BRPM | OXYGEN SATURATION: 98 %

## 2021-06-02 DIAGNOSIS — I10 ESSENTIAL HYPERTENSION WITH GOAL BLOOD PRESSURE LESS THAN 130/80: ICD-10-CM

## 2021-06-02 DIAGNOSIS — Z23 ENCOUNTER FOR IMMUNIZATION: ICD-10-CM

## 2021-06-02 DIAGNOSIS — D12.6 ADENOMATOUS POLYP OF COLON, UNSPECIFIED PART OF COLON: ICD-10-CM

## 2021-06-02 DIAGNOSIS — E11.9 CONTROLLED TYPE 2 DIABETES MELLITUS WITHOUT COMPLICATION, WITHOUT LONG-TERM CURRENT USE OF INSULIN (HCC): Primary | ICD-10-CM

## 2021-06-02 DIAGNOSIS — E78.2 MIXED HYPERLIPIDEMIA: ICD-10-CM

## 2021-06-02 DIAGNOSIS — E55.9 VITAMIN D DEFICIENCY: ICD-10-CM

## 2021-06-02 DIAGNOSIS — Z80.0 FAMILY HISTORY OF COLON CANCER: ICD-10-CM

## 2021-06-02 PROCEDURE — 99214 OFFICE O/P EST MOD 30 MIN: CPT | Performed by: FAMILY MEDICINE

## 2021-06-02 PROCEDURE — 90750 HZV VACC RECOMBINANT IM: CPT | Performed by: FAMILY MEDICINE

## 2021-06-02 PROCEDURE — 90471 IMMUNIZATION ADMIN: CPT | Performed by: FAMILY MEDICINE

## 2021-06-02 NOTE — PROGRESS NOTES
Desiderio Apley presents today for   Chief Complaint   Patient presents with    Follow-up       Is someone accompanying this pt? no    Is the patient using any DME equipment during OV? no    Depression Screening:  3 most recent PHQ Screens 6/2/2021   PHQ Not Done Patient Decline   Little interest or pleasure in doing things Not at all   Feeling down, depressed, irritable, or hopeless Not at all   Total Score PHQ 2 0       Learning Assessment:  Learning Assessment 5/17/2016   PRIMARY LEARNER Patient   HIGHEST LEVEL OF EDUCATION - PRIMARY LEARNER  2 YEARS [address] CAREGIVER No   PRIMARY LANGUAGE ENGLISH   LEARNER PREFERENCE PRIMARY READING   ANSWERED BY patient   RELATIONSHIP SELF       Travel Screening:    Travel Screening     Question   Response    In the last month, have you been in contact with someone who was confirmed or suspected to have Sudhakar Dials / COVID-19? No / Unsure    Have you had a COVID-19 viral test in the last 14 days? No    Do you have any of the following new or worsening symptoms? None of these    Have you traveled internationally or domestically in the last month? No      Travel History   Travel since 05/02/21     No documented travel since 05/02/21          Health Maintenance reviewed and discussed and ordered per Provider. Health Maintenance Due   Topic Date Due    COVID-19 Vaccine (1) Never done    DTaP/Tdap/Td series (1 - Tdap) Never done    Shingrix Vaccine Age 50> (1 of 2) Never done   . Coordination of Care:  1. Have you been to the ER, urgent care clinic since your last visit? Hospitalized since your last visit? no    2. Have you seen or consulted any other health care providers outside of the [address] [place] [address] since your last visit? Include any pap smears or colon screening. no     Immunization Shingrix administered 6/2/2021 by Gonzales amaya by Garrison Romero LPN. Patient tolerated procedure well. No reactions noted.

## 2021-06-02 NOTE — PATIENT INSTRUCTIONS
Continue current medications Avoid dietary salt, starch and sugar and follow program of regular aerobic exercise Return for an office evaluation with hemoglobin A1c, Shingrix #2 in 4 months, sooner with any problems

## 2021-06-02 NOTE — PROGRESS NOTES
HISTORY OF PRESENT ILLNESS  Ronel Cooper is a 63 y.o. male. Mr. Arianne Hodgson was seen for his annual physical exam and follow-up 3 months ago at which time he reported he had been out of all of his medications for a few months and had just resumed taking them a week prior to his appointment. Lab results were obtained, he was asked to resume taking his medication on a regular basis and has return for follow-up with repeat lab results. Today he reports that his father recently passed away and that he has \"not been himself\" and has again missed some medication. Mr#: [id number]      Past Medical History:   Diagnosis Date    Diabetes ([address] 2006    Hypercholesterolemia     Hypertension 2006    Sleep apnea        Past Surgical History:   Procedure Laterality Date    COLONOSCOPY N/A 3/31/2017    COLONOSCOPY w hot polypectomy performed by Cailin Zuniga MD at [address] [place] [address] HX HEMORRHOIDECTOMY  2/2014       Family History   Problem Relation Age of Onset    Cancer Sister         rectal    Cancer Maternal Uncle         colon    Diabetes Maternal Uncle     Cancer Maternal Grandfather         colon    Diabetes Maternal Grandfather     Cancer Paternal Grandfather         unknown type     Heart Disease Neg Hx     Hypertension Neg Hx        Allergies   Allergen Reactions    Penicillins Rash       Social History     Tobacco Use   Smoking Status Former Smoker   Smokeless Tobacco Former User    Quit date: 3/17/2015   Tobacco Comment    quit over a year ago        Social History     Substance and Sexual Activity   Alcohol Use Yes    Comment: occasionally            Patient Active Problem List   Diagnosis Code    Sleep apnea G47.30    Obesity (BMI 30.0-34. 9) E66.9    Family history of colon cancer Z80.0    Mixed hyperlipidemia E78.2    Essential hypertension I10    Vitamin D deficiency E55.9    Controlled type 2 diabetes mellitus without complication, without long-term current use of insulin (HCC) E11.9    Adenomatous colon polyp D12.6         Current Outpatient Medications:     losartan-hydroCHLOROthiazide (HYZAAR) 100-12.5 mg per tablet, TAKE 1 TABLET BY MOUTH DAILY, Disp: 90 Tab, Rfl: 0    metFORMIN (GLUCOPHAGE) 500 mg tablet, TAKE 1 TABLET BY MOUTH DAILY WITH SUPPER, Disp: 90 Tab, Rfl: 0    atorvastatin (LIPITOR) 80 mg tablet, TAKE 1 TABLET BY MOUTH DAILY, Disp: 90 Tab, Rfl: 0    ergocalciferol (ERGOCALCIFEROL) 1,250 mcg (50,000 unit) capsule, TAKE 1 CAPSULE BY MOUTH EVERY 7 DAYS, Disp: 12 Cap, Rfl: 3       Review of Systems   Constitutional: Negative for fever and weight loss. Respiratory: Negative for shortness of breath. Cardiovascular: Negative for chest pain and palpitations. Gastrointestinal: Negative for abdominal pain. Genitourinary: Negative for frequency. Neurological: Negative for tingling. Endo/Heme/Allergies: Negative for polydipsia. Psychiatric/Behavioral: Positive for depression ( Associated with the recent death of his father). Negative for suicidal ideas. Visit Vitals  BP (!) 138/90 (BP 1 Location: Left upper arm, BP Patient Position: Sitting, BP Cuff Size: Adult)   Pulse (!) 103   Temp 97.2 °F (36.2 °C) (Temporal)   Resp 15   Ht 5' 8\" (1.727 m)   Wt 191 lb 12.8 oz (87 kg)   SpO2 98%   BMI 29.16 kg/m²       Physical Exam  Vitals and nursing note reviewed. Constitutional:       General: He is not in acute distress. Appearance: Normal appearance. He is well-developed. He is not ill-appearing. HENT:      Head: Normocephalic. Eyes:      Extraocular Movements: Extraocular movements intact. Cardiovascular:      Rate and Rhythm: Normal rate. Neurological:      Mental Status: He is alert and oriented to person, place, and time. Psychiatric:         Behavior: Behavior normal.       Results for Soraida Quiñonez (MRN [id number]) as of 6/2/2021 06:29   Ref.  Range 3/1/2021 15:31 5/28/2021 00:00   Triglyceride Latest Ref Range: 0 - 149 mg/dL 101 105   Cholesterol, total Latest Ref Range: 100 - 199 mg/dL 186 144   HDL Cholesterol Latest Ref Range: >39 mg/dL 44 38 (L)   CHOL/HDL Ratio Latest Ref Range: 0 - 5.0   4.2    VLDL, calculated Latest Ref Range: 5 - 40 mg/dL 20.2 19   LDL, calculated Latest Ref Range: 0 - 99 mg/dL 121.8 (H) 87   Hemoglobin A1c, (calculated) Latest Ref Range: 4.8 - 5.6 % 6.1 (H) 6.6 (H)   Est. average glucose Latest Units: mg/dL 128    Estimated average glucose Latest Units: mg/dL  143   Creatinine, urine Latest Ref Range: 30 - 125 mg/dL 100.00    Microalbumin,urine random Latest Ref Range: 0 - 3.0 MG/DL 0.75    Microalbumin/Creat. Ratio Latest Ref Range: 0 - 30 mg/g 8    TSH Latest Ref Range: 0.36 - 3.74 uIU/mL 0.86    Glucose Latest Ref Range: 74 - 99 mg/dL 90    Prostate Specific Ag Latest Ref Range: 0.0 - 4.0 ng/mL 1.3    Vitamin D 25-Hydroxy Latest Ref Range: 30 - 100 ng/mL 13.5 (L)    VITAMIN D, 25-HYDROXY Latest Ref Range: 30.0 - 100.0 ng/mL  42.9       ASSESSMENT and PLAN    ICD-10-CM ICD-9-CM    1. Controlled type 2 diabetes mellitus without complication, without long-term current use of insulin (HCC)  E11.9 250.00    2. Essential hypertension with goal blood pressure less than 130/80  I10 401.9    3. Mixed hyperlipidemia  E78.2 272.2    4. Vitamin D deficiency  E55.9 268.9    5. Adenomatous polyp of colon, unspecified part of colon  D12.6 211.3    6. Family history of colon cancer  Z80.0 V16.0    7.  Encounter for immunization  Z23 V03.89 ZOSTER VACC RECOMBINANT ADJUVANTED   Assessment:  Type 2 diabetes now in satisfactory control  Suboptimal control of hypertension  LDL cholesterol near goal  Vitamin D levels normal on current medication    Health maintenance:  COVID-19 immunization  Tdap immunization-reports current within 6 months with injury  Shingrix immunization-#1 today    Plan:  Continue current medications, try hard to take all medications on a daily basis  Avoid dietary salt, starch and sugar and follow program of regular aerobic exercise  Return for an office evaluation with hemoglobin A1c, Shingrix #2 in 4 months, sooner with any problems    Mark Eden MD      PLEASE NOTE:   This document has been produced using voice recognition software. Unrecognized errors in transcription may be present.

## 2021-09-07 DIAGNOSIS — E55.9 VITAMIN D DEFICIENCY: ICD-10-CM

## 2021-09-07 DIAGNOSIS — E78.2 MIXED HYPERLIPIDEMIA: ICD-10-CM

## 2021-09-07 DIAGNOSIS — I10 ESSENTIAL HYPERTENSION WITH GOAL BLOOD PRESSURE LESS THAN 130/80: ICD-10-CM

## 2021-09-07 DIAGNOSIS — E11.9 CONTROLLED TYPE 2 DIABETES MELLITUS WITHOUT COMPLICATION, WITHOUT LONG-TERM CURRENT USE OF INSULIN (HCC): ICD-10-CM

## 2021-09-07 RX ORDER — METFORMIN HYDROCHLORIDE 500 MG/1
TABLET ORAL
Qty: 90 TABLET | Refills: 0 | Status: SHIPPED | OUTPATIENT
Start: 2021-09-07 | End: 2022-09-02

## 2021-09-07 RX ORDER — LOSARTAN POTASSIUM AND HYDROCHLOROTHIAZIDE 12.5; 1 MG/1; MG/1
1 TABLET ORAL DAILY
Qty: 90 TABLET | Refills: 0 | Status: SHIPPED | OUTPATIENT
Start: 2021-09-07 | End: 2022-06-21

## 2021-09-07 RX ORDER — ATORVASTATIN CALCIUM 80 MG/1
80 TABLET, FILM COATED ORAL DAILY
Qty: 90 TABLET | Refills: 0 | Status: SHIPPED | OUTPATIENT
Start: 2021-09-07 | End: 2022-09-02

## 2021-09-07 RX ORDER — ERGOCALCIFEROL 1.25 MG/1
CAPSULE ORAL
Qty: 12 CAPSULE | Refills: 3 | Status: SHIPPED | OUTPATIENT
Start: 2021-09-07

## 2021-09-07 NOTE — TELEPHONE ENCOUNTER
Tl Wood called for their medication refill.     Last Office visit:  6/2/2021    Last Filled: 5/8/2021; Qty 90 w/ 0 refills     Follow up visit:    Future Appointments   Date Time Provider Michael Bailon   10/6/2021  9:00 AM Denise Hernandez MD [facility]

## 2021-10-05 NOTE — PROGRESS NOTES
HISTORY OF PRESENT ILLNESS  Filipe Stiles is a 63 y.o. male. He presents for follow-up with history of type 2 diabetes, hypertension, hyperlipidemia, vitamin D deficiency, tubular adenomas of the colon and a family history of carcinoma the colon    Mr#: [id number]      Past Medical History:   Diagnosis Date    Diabetes ([address] 2006    Hypercholesterolemia     Hypertension 2006    Sleep apnea        Past Surgical History:   Procedure Laterality Date    COLONOSCOPY N/A 3/31/2017    COLONOSCOPY w hot polypectomy performed by Manan Vallejo MD at [address] [place] [address] HX HEMORRHOIDECTOMY  2/2014       Family History   Problem Relation Age of Onset    Cancer Sister         rectal    Cancer Maternal Uncle         colon    Diabetes Maternal Uncle     Cancer Maternal Grandfather         colon    Diabetes Maternal Grandfather     Cancer Paternal Grandfather         unknown type     Heart Disease Neg Hx     Hypertension Neg Hx        Allergies   Allergen Reactions    Penicillins Rash       Social History     Tobacco Use   Smoking Status Former Smoker   Smokeless Tobacco Former User    Quit date: 3/17/2015   Tobacco Comment    quit over a year ago        Social History     Substance and Sexual Activity   Alcohol Use Yes    Comment: occasionally            Patient Active Problem List   Diagnosis Code    Sleep apnea G47.30    Obesity (BMI 30.0-34. 9) E66.9    Family history of colon cancer Z80.0    Mixed hyperlipidemia E78.2    Essential hypertension I10    Vitamin D deficiency E55.9    Controlled type 2 diabetes mellitus without complication, without long-term current use of insulin (HCC) E11.9    Adenomatous colon polyp D12.6         Current Outpatient Medications:     atorvastatin (LIPITOR) 80 mg tablet, Take 1 Tablet by mouth daily. , Disp: 90 Tablet, Rfl: 0    losartan-hydroCHLOROthiazide (HYZAAR) 100-12.5 mg per tablet, Take 1 Tablet by mouth daily. , Disp: 90 Tablet, Rfl: 0    metFORMIN (GLUCOPHAGE) 500 mg tablet, TAKE 1 TABLET BY MOUTH DAILY WITH SUPPER, Disp: 90 Tablet, Rfl: 0    ergocalciferol (ERGOCALCIFEROL) 1,250 mcg (50,000 unit) capsule, TAKE 1 CAPSULE BY MOUTH EVERY 7 DAYS, Disp: 12 Capsule, Rfl: 3       Review of Systems   Constitutional: Negative for fever and weight loss. Eyes: Negative for blurred vision. Respiratory: Negative for shortness of breath. Cardiovascular: Negative for chest pain and palpitations. Gastrointestinal: Negative for blood in stool. Genitourinary: Negative for frequency. Neurological: Negative for dizziness, tingling and headaches. Endo/Heme/Allergies: Negative for polydipsia. Psychiatric/Behavioral:        He reports occasional episodes when he seems to add an S to the ends of words and perhaps has slurred speech. He thinks this is most likely to occur if he is fatigued and also if he is speaking in front of a group of people. Visit Vitals  /82   Pulse 95   Temp 97.3 °F (36.3 °C) (Temporal)   Resp 16   Ht 5' 8\" (1.727 m)   Wt 189 lb (85.7 kg)   SpO2 98%   BMI 28.74 kg/m²       Physical Exam  Vitals and nursing note reviewed. Constitutional:       General: He is not in acute distress. Appearance: Normal appearance. He is well-developed. He is not ill-appearing. HENT:      Head: Normocephalic. Eyes:      Extraocular Movements: Extraocular movements intact. Cardiovascular:      Rate and Rhythm: Normal rate. Pulmonary:      Effort: Pulmonary effort is normal.   Neurological:      Mental Status: He is alert and oriented to person, place, and time. Psychiatric:         Mood and Affect: Mood normal.         Behavior: Behavior normal.         Thought Content: Thought content normal.       Results for Adarsh Gray (MRN [id number]) as of 10/6/2021 09:16   Ref.  Range 3/1/2021 15:31 5/28/2021 00:00 10/6/2021 09:14   Triglyceride Latest Ref Range: 0 - 149 mg/dL 101 105    Cholesterol, total Latest Ref Range: 100 - 199 mg/dL 186 144    HDL Cholesterol Latest Ref Range: >39 mg/dL 44 38 (L)    CHOL/HDL Ratio Latest Ref Range: 0 - 5.0   4.2     VLDL, calculated Latest Ref Range: 5 - 40 mg/dL 20.2 19    LDL, calculated Latest Ref Range: 0 - 99 mg/dL 121.8 (H) 87    Hemoglobin A1c, (calculated) Latest Ref Range: 4.8 - 5.6 % 6.1 (H) 6.6 (H)    Hemoglobin A1c (POC) Latest Units: %   6.2   Est. average glucose Latest Units: mg/dL 128     Estimated average glucose Latest Units: mg/dL  143    Creatinine, urine Latest Ref Range: 30 - 125 mg/dL 100.00     Microalbumin,urine random Latest Ref Range: 0 - 3.0 MG/DL 0.75       ASSESSMENT and PLAN    ICD-10-CM ICD-9-CM    1. Controlled type 2 diabetes mellitus without complication, without long-term current use of insulin (HCC)  E11.9 250.00 AMB POC HEMOGLOBIN A1C      HEMOGLOBIN A1C WITH EAG      MICROALBUMIN, UR, RAND W/ MICROALB/CREAT RATIO   2. Essential hypertension with goal blood pressure less than 130/80  I10 401.9 URINALYSIS W/ RFLX MICROSCOPIC      METABOLIC PANEL, COMPREHENSIVE   3. Mixed hyperlipidemia  E78.2 272.2 TSH 3RD GENERATION      LIPID PANEL   4. Vitamin D deficiency  E55.9 268.9 VITAMIN D, 25 HYDROXY   5. Adenomatous polyp of colon, unspecified part of colon  D12.6 211.3    6. Family history of colon cancer  Z80.0 V16.0    7. Needs flu shot  Z23 V04.81 INFLUENZA VIRUS VAC QUAD,SPLIT,PRESV FREE SYRINGE IM   8. Encounter for immunization  Z23 V03.89 ZOSTER VACC RECOMBINANT ADJUVANTED   9.  Routine health maintenance  Z00.00 V70.0 CBC WITH AUTOMATED DIFF      HEMOGLOBIN A1C WITH EAG      URINALYSIS W/ RFLX MICROSCOPIC      TSH 3RD GENERATION      MICROALBUMIN, UR, RAND W/ MICROALB/CREAT RATIO      METABOLIC PANEL, COMPREHENSIVE      LIPID PANEL      PSA SCREENING (SCREENING)      VITAMIN D, 25 HYDROXY   Assessment:  Hypertension fairly well controlled  Diabetes adequately controlled  Hyperlipidemia and vitamin D deficiency adequately treated with follow-up plan due in 6 months  History of tubular adenoma the colon and family history of colon cancer with colonoscopy surveillance up-to-date    Health maintenance recommendations:  Influenza immunization  Shingrix immunization    Plan:  Write down the circumstances if speech changes occur in the future  Continue current medications  Avoid dietary salt starch and sugar and follow program of regular aerobic exercise  Return for lab appointment followed by annual physical exam appointment in 6 months or sooner with any problems    Peggy Whittaker MD      PLEASE NOTE:   This document has been produced using voice recognition software. Unrecognized errors in transcription may be present.

## 2021-10-06 ENCOUNTER — OFFICE VISIT (OUTPATIENT)
Dept: FAMILY MEDICINE CLINIC | Age: 56
End: 2021-10-06
Payer: COMMERCIAL

## 2021-10-06 VITALS
TEMPERATURE: 97.3 F | SYSTOLIC BLOOD PRESSURE: 120 MMHG | HEIGHT: 68 IN | OXYGEN SATURATION: 98 % | RESPIRATION RATE: 16 BRPM | BODY MASS INDEX: 28.64 KG/M2 | HEART RATE: 95 BPM | WEIGHT: 189 LBS | DIASTOLIC BLOOD PRESSURE: 82 MMHG

## 2021-10-06 DIAGNOSIS — Z80.0 FAMILY HISTORY OF COLON CANCER: ICD-10-CM

## 2021-10-06 DIAGNOSIS — Z00.00 ROUTINE HEALTH MAINTENANCE: ICD-10-CM

## 2021-10-06 DIAGNOSIS — E78.2 MIXED HYPERLIPIDEMIA: ICD-10-CM

## 2021-10-06 DIAGNOSIS — Z23 NEEDS FLU SHOT: ICD-10-CM

## 2021-10-06 DIAGNOSIS — E11.9 CONTROLLED TYPE 2 DIABETES MELLITUS WITHOUT COMPLICATION, WITHOUT LONG-TERM CURRENT USE OF INSULIN (HCC): Primary | ICD-10-CM

## 2021-10-06 DIAGNOSIS — D12.6 ADENOMATOUS POLYP OF COLON, UNSPECIFIED PART OF COLON: ICD-10-CM

## 2021-10-06 DIAGNOSIS — Z23 ENCOUNTER FOR IMMUNIZATION: ICD-10-CM

## 2021-10-06 DIAGNOSIS — E55.9 VITAMIN D DEFICIENCY: ICD-10-CM

## 2021-10-06 DIAGNOSIS — I10 ESSENTIAL HYPERTENSION WITH GOAL BLOOD PRESSURE LESS THAN 130/80: ICD-10-CM

## 2021-10-06 LAB — HBA1C MFR BLD HPLC: 6.2 %

## 2021-10-06 PROCEDURE — 90471 IMMUNIZATION ADMIN: CPT | Performed by: FAMILY MEDICINE

## 2021-10-06 PROCEDURE — 90472 IMMUNIZATION ADMIN EACH ADD: CPT | Performed by: FAMILY MEDICINE

## 2021-10-06 PROCEDURE — 90686 IIV4 VACC NO PRSV 0.5 ML IM: CPT | Performed by: FAMILY MEDICINE

## 2021-10-06 PROCEDURE — 83036 HEMOGLOBIN GLYCOSYLATED A1C: CPT | Performed by: FAMILY MEDICINE

## 2021-10-06 PROCEDURE — 99214 OFFICE O/P EST MOD 30 MIN: CPT | Performed by: FAMILY MEDICINE

## 2021-10-06 PROCEDURE — 90750 HZV VACC RECOMBINANT IM: CPT | Performed by: FAMILY MEDICINE

## 2021-10-06 NOTE — PROGRESS NOTES
Umesh Miller is a 63 y.o. male (: 1965) presenting to address:    Chief Complaint   Patient presents with    Diabetes       Vitals:    10/06/21 0910   BP: 120/82   Pulse: 95   Resp: 16   Temp: 97.3 °F (36.3 °C)   TempSrc: Temporal   SpO2: 98%   Weight: 189 lb (85.7 kg)   Height: 5' 8\" (1.727 m)   PainSc:   0 - No pain       Hearing/Vision:   No exam data present    Learning Assessment:     Learning Assessment 2016   PRIMARY LEARNER Patient   HIGHEST LEVEL OF EDUCATION - PRIMARY LEARNER  2 YEARS OF COLLEGE   BARRIERS PRIMARY LEARNER NONE   CO-LEARNER CAREGIVER No   PRIMARY LANGUAGE ENGLISH   LEARNER PREFERENCE PRIMARY READING   ANSWERED BY patient   RELATIONSHIP SELF     Depression Screening:     3 most recent PHQ Screens 10/6/2021   PHQ Not Done -   Little interest or pleasure in doing things Not at all   Feeling down, depressed, irritable, or hopeless Not at all   Total Score PHQ 2 0     Fall Risk Assessment:     Fall Risk Assessment, last 12 mths 3/1/2021   Able to walk? Yes   Fall in past 12 months? 0   Do you feel unsteady? 0   Are you worried about falling 0     Abuse Screening:     Abuse Screening Questionnaire 2019   Do you ever feel afraid of your partner? N   Are you in a relationship with someone who physically or mentally threatens you? N   Is it safe for you to go home? Y     Coordination of Care Questionaire:   1. Have you been to the ER, urgent care clinic since your last visit? Hospitalized since your last visit? NO    2. Have you seen or consulted any other health care providers outside of the [address] [place] [address] since your last visit? Include any pap smears or colon screening. NO    Advanced Directive:   1. Do you have an Advanced Directive? NO    2. Would you like information on Advanced Directives? NO    Flu shot Immunization/s administered 10/6/2021 by Matias Patterson LPN with guardian's consent. Patient tolerated procedure well. No reactions noted.

## 2021-10-06 NOTE — PATIENT INSTRUCTIONS
Write down the circumstances if speech changes occur in the future  Continue current medications  Avoid dietary salt starch and sugar and follow program of regular aerobic exercise  Return for lab appointment followed by annual physical exam appointment in 6 months or sooner with any problems

## 2022-03-19 PROBLEM — D12.6 ADENOMATOUS COLON POLYP: Status: ACTIVE | Noted: 2017-07-27

## 2022-03-19 PROBLEM — E11.9 CONTROLLED TYPE 2 DIABETES MELLITUS WITHOUT COMPLICATION, WITHOUT LONG-TERM CURRENT USE OF INSULIN (HCC): Status: ACTIVE | Noted: 2017-01-12

## 2022-04-04 ENCOUNTER — APPOINTMENT (OUTPATIENT)
Dept: FAMILY MEDICINE CLINIC | Age: 57
End: 2022-04-04

## 2022-04-04 ENCOUNTER — HOSPITAL ENCOUNTER (OUTPATIENT)
Dept: LAB | Age: 57
Discharge: HOME | End: 2022-04-04
Payer: COMMERCIAL

## 2022-04-04 DIAGNOSIS — E78.2 MIXED HYPERLIPIDEMIA: ICD-10-CM

## 2022-04-04 DIAGNOSIS — I10 ESSENTIAL HYPERTENSION WITH GOAL BLOOD PRESSURE LESS THAN 130/80: ICD-10-CM

## 2022-04-04 DIAGNOSIS — E55.9 VITAMIN D DEFICIENCY: ICD-10-CM

## 2022-04-04 DIAGNOSIS — E11.9 CONTROLLED TYPE 2 DIABETES MELLITUS WITHOUT COMPLICATION, WITHOUT LONG-TERM CURRENT USE OF INSULIN (HCC): ICD-10-CM

## 2022-04-04 DIAGNOSIS — Z00.00 ROUTINE HEALTH MAINTENANCE: ICD-10-CM

## 2022-04-04 LAB
25(OH)D3 SERPL-MCNC: 51.1 NG/ML (ref 30–100)
ALBUMIN SERPL-MCNC: 4.1 G/DL (ref 3.4–5)
ALBUMIN/GLOB SERPL: 1.5 {RATIO} (ref 0.8–1.7)
ALP SERPL-CCNC: 60 U/L (ref 45–117)
ALT SERPL-CCNC: 44 U/L (ref 16–61)
ANION GAP SERPL CALC-SCNC: 8 MMOL/L (ref 3–18)
APPEARANCE UR: CLEAR
AST SERPL-CCNC: 18 U/L (ref 10–38)
BACTERIA URNS QL MICRO: ABNORMAL /HPF
BASOPHILS # BLD: 0 K/UL (ref 0–0.1)
BASOPHILS NFR BLD: 1 % (ref 0–2)
BILIRUB SERPL-MCNC: 1.1 MG/DL (ref 0.2–1)
BILIRUB UR QL: NEGATIVE
BUN SERPL-MCNC: 11 MG/DL (ref 7–18)
BUN/CREAT SERPL: 10 (ref 12–20)
CALCIUM SERPL-MCNC: 9.1 MG/DL (ref 8.5–10.1)
CHLORIDE SERPL-SCNC: 103 MMOL/L (ref 100–111)
CHOLEST SERPL-MCNC: 115 MG/DL
CO2 SERPL-SCNC: 28 MMOL/L (ref 21–32)
COLOR UR: ABNORMAL
CREAT SERPL-MCNC: 1.1 MG/DL (ref 0.6–1.3)
CREAT UR-MCNC: 594 MG/DL (ref 30–125)
DIFFERENTIAL METHOD BLD: ABNORMAL
EOSINOPHIL # BLD: 0.1 K/UL (ref 0–0.4)
EOSINOPHIL NFR BLD: 2 % (ref 0–5)
EPITH CASTS URNS QL MICRO: ABNORMAL /LPF (ref 0–5)
ERYTHROCYTE [DISTWIDTH] IN BLOOD BY AUTOMATED COUNT: 13.4 % (ref 11.6–14.5)
EST. AVERAGE GLUCOSE BLD GHB EST-MCNC: 134 MG/DL
GLOBULIN SER CALC-MCNC: 2.8 G/DL (ref 2–4)
GLUCOSE SERPL-MCNC: 128 MG/DL (ref 74–99)
GLUCOSE UR STRIP.AUTO-MCNC: NEGATIVE MG/DL
HBA1C MFR BLD: 6.3 % (ref 4.2–5.6)
HCT VFR BLD AUTO: 41.2 % (ref 36–48)
HDLC SERPL-MCNC: 41 MG/DL (ref 40–60)
HDLC SERPL: 2.8 {RATIO} (ref 0–5)
HGB BLD-MCNC: 13.5 G/DL (ref 13–16)
HGB UR QL STRIP: NEGATIVE
IMM GRANULOCYTES # BLD AUTO: 0 K/UL (ref 0–0.04)
IMM GRANULOCYTES NFR BLD AUTO: 0 % (ref 0–0.5)
KETONES UR QL STRIP.AUTO: ABNORMAL MG/DL
LDLC SERPL CALC-MCNC: 63.2 MG/DL (ref 0–100)
LEUKOCYTE ESTERASE UR QL STRIP.AUTO: NEGATIVE
LIPID PROFILE,FLP: NORMAL
LYMPHOCYTES # BLD: 2.2 K/UL (ref 0.9–3.6)
LYMPHOCYTES NFR BLD: 49 % (ref 21–52)
MCH RBC QN AUTO: 26.9 PG (ref 24–34)
MCHC RBC AUTO-ENTMCNC: 32.8 G/DL (ref 31–37)
MCV RBC AUTO: 82.2 FL (ref 78–100)
MICROALBUMIN UR-MCNC: 4.51 MG/DL (ref 0–3)
MICROALBUMIN/CREAT UR-RTO: 8 MG/G (ref 0–30)
MONOCYTES # BLD: 0.3 K/UL (ref 0.05–1.2)
MONOCYTES NFR BLD: 7 % (ref 3–10)
MUCOUS THREADS URNS QL MICRO: ABNORMAL /LPF
NEUTS SEG # BLD: 1.9 K/UL (ref 1.8–8)
NEUTS SEG NFR BLD: 42 % (ref 40–73)
NITRITE UR QL STRIP.AUTO: NEGATIVE
NRBC # BLD: 0 K/UL (ref 0–0.01)
NRBC BLD-RTO: 0 PER 100 WBC
PH UR STRIP: 5.5 [PH] (ref 5–8)
PLATELET # BLD AUTO: 233 K/UL (ref 135–420)
PMV BLD AUTO: 12.2 FL (ref 9.2–11.8)
POTASSIUM SERPL-SCNC: 3.7 MMOL/L (ref 3.5–5.5)
PROT SERPL-MCNC: 6.9 G/DL (ref 6.4–8.2)
PROT UR STRIP-MCNC: 30 MG/DL
PSA SERPL-MCNC: 1 NG/ML (ref 0–4)
RBC # BLD AUTO: 5.01 M/UL (ref 4.35–5.65)
RBC #/AREA URNS HPF: NEGATIVE /HPF (ref 0–5)
SODIUM SERPL-SCNC: 139 MMOL/L (ref 136–145)
SP GR UR REFRACTOMETRY: >1.03 (ref 1–1.03)
TRIGL SERPL-MCNC: 54 MG/DL (ref ?–150)
TSH SERPL DL<=0.05 MIU/L-ACNC: 1.34 UIU/ML (ref 0.36–3.74)
UROBILINOGEN UR QL STRIP.AUTO: 1 EU/DL (ref 0.2–1)
VLDLC SERPL CALC-MCNC: 10.8 MG/DL
WBC # BLD AUTO: 4.5 K/UL (ref 4.6–13.2)
WBC URNS QL MICRO: ABNORMAL /HPF (ref 0–4)

## 2022-04-04 PROCEDURE — 83036 HEMOGLOBIN GLYCOSYLATED A1C: CPT

## 2022-04-04 PROCEDURE — 36415 COLL VENOUS BLD VENIPUNCTURE: CPT

## 2022-04-04 PROCEDURE — 84443 ASSAY THYROID STIM HORMONE: CPT

## 2022-04-04 PROCEDURE — 82306 VITAMIN D 25 HYDROXY: CPT

## 2022-04-04 PROCEDURE — 80061 LIPID PANEL: CPT

## 2022-04-04 PROCEDURE — 84153 ASSAY OF PSA TOTAL: CPT

## 2022-04-04 PROCEDURE — 80053 COMPREHEN METABOLIC PANEL: CPT

## 2022-04-04 PROCEDURE — 85025 COMPLETE CBC W/AUTO DIFF WBC: CPT

## 2022-04-04 PROCEDURE — 81001 URINALYSIS AUTO W/SCOPE: CPT

## 2022-04-04 PROCEDURE — 82043 UR ALBUMIN QUANTITATIVE: CPT

## 2022-04-05 NOTE — PROGRESS NOTES
HISTORY OF PRESENT ILLNESS  Chelsea Decker is a 61 y.o. male. He presents for health assessment, preventative care and follow-up with a history of type 2 diabetes, hypertension, hyperlipidemia, vitamin D deficiency, adenomatous polyp of the colon and a family history of carcinoma the colon. Mr#: [id number]      Past Medical History:   Diagnosis Date    Diabetes ([address] 2006    Hypercholesterolemia     Hypertension 2006    Sleep apnea        Past Surgical History:   Procedure Laterality Date    COLONOSCOPY N/A 3/31/2017    COLONOSCOPY w hot polypectomy performed by Chelsie Ramon MD at [address] [place] [address] HX HEMORRHOIDECTOMY  2/2014       Family History   Problem Relation Age of Onset    Cancer Sister         rectal    Cancer Maternal Uncle         colon    Diabetes Maternal Uncle     Cancer Maternal Grandfather         colon    Diabetes Maternal Grandfather     Cancer Paternal Grandfather         unknown type     Heart Disease Neg Hx     Hypertension Neg Hx        Allergies   Allergen Reactions    Penicillins Rash       Social History     Tobacco Use   Smoking Status Former Smoker   Smokeless Tobacco Former User    Quit date: 3/17/2015   Tobacco Comment    quit over a year ago        Social History     Substance and Sexual Activity   Alcohol Use Yes    Comment: occasionally            Patient Active Problem List   Diagnosis Code    Sleep apnea G47.30    Obesity (BMI 30.0-34. 9) E66.9    Family history of colon cancer Z80.0    Mixed hyperlipidemia E78.2    Essential hypertension I10    Vitamin D deficiency E55.9    Controlled type 2 diabetes mellitus without complication, without long-term current use of insulin (HCC) E11.9    Adenomatous colon polyp D12.6         Current Outpatient Medications:     atorvastatin (LIPITOR) 80 mg tablet, Take 1 Tablet by mouth daily. , Disp: 90 Tablet, Rfl: 0    losartan-hydroCHLOROthiazide (HYZAAR) 100-12.5 mg per tablet, Take 1 Tablet by mouth daily. , Disp: 90 Tablet, Rfl: 0    metFORMIN (GLUCOPHAGE) 500 mg tablet, TAKE 1 TABLET BY MOUTH DAILY WITH SUPPER, Disp: 90 Tablet, Rfl: 0    ergocalciferol (ERGOCALCIFEROL) 1,250 mcg (50,000 unit) capsule, TAKE 1 CAPSULE BY MOUTH EVERY 7 DAYS, Disp: 12 Capsule, Rfl: 3       Review of Systems   Constitutional: Negative for chills, fever and weight loss. HENT: Negative for congestion, ear pain, hearing loss and sore throat. Eyes: Negative for blurred vision and double vision. Respiratory: Negative for cough, shortness of breath and wheezing. Cardiovascular: Negative for chest pain, palpitations and leg swelling. Gastrointestinal: Positive for blood in stool (occ. BRB). Negative for abdominal pain, constipation, diarrhea, heartburn, melena, nausea and vomiting. Genitourinary: Negative for dysuria, frequency and urgency. Musculoskeletal: Positive for joint pain. Negative for myalgias. Skin: Negative for itching and rash. Skin lesion back   Neurological: Negative for dizziness, tingling, sensory change, focal weakness and headaches. Endo/Heme/Allergies: Negative for environmental allergies. Psychiatric/Behavioral: Negative for depression. The patient is not nervous/anxious and does not have insomnia. Visit Vitals  /80   Pulse 97   Temp 97.6 °F (36.4 °C) (Temporal)   Resp 16   Ht 5' 8\" (1.727 m)   Wt 184 lb (83.5 kg)   SpO2 100%   BMI 27.98 kg/m²         Physical Exam  Vitals and nursing note reviewed. Constitutional:       General: He is not in acute distress. Appearance: Normal appearance. He is not ill-appearing. HENT:      Head: Normocephalic. Right Ear: Tympanic membrane, ear canal and external ear normal.      Left Ear: Tympanic membrane, ear canal and external ear normal.   Eyes:      Extraocular Movements: Extraocular movements intact. Conjunctiva/sclera: Conjunctivae normal.      Pupils: Pupils are equal, round, and reactive to light.    Neck:      Vascular: No carotid bruit. Cardiovascular:      Rate and Rhythm: Normal rate and regular rhythm. Heart sounds: Normal heart sounds. Pulmonary:      Effort: Pulmonary effort is normal.      Breath sounds: Normal breath sounds. Abdominal:      Palpations: Abdomen is soft. Tenderness: There is no abdominal tenderness. Musculoskeletal:         General: No deformity. Cervical back: Neck supple. Right lower leg: No edema. Left lower leg: No edema. Skin:     General: Skin is warm and dry. Neurological:      General: No focal deficit present. Mental Status: He is alert and oriented to person, place, and time. Psychiatric:         Mood and Affect: Mood normal.         Behavior: Behavior normal.         Thought Content: Thought content normal.            Diabetic foot exam performed by Chula Dooley MD     Measurement  Response Nurse Comment Physician Comment   Monofilament  R - normal sensation with micro filament  L - normal sensation with micro filament     Pulse DP R - 2+ (normal)  L - 2+ (normal)     Pulse TP R - absent  L - absent     Structural deformity R - None  L - None     Skin Integrity / Deformity R - None  L - None                                   ASSESSMENT and PLAN    ICD-10-CM ICD-9-CM    1. Routine general medical examination at a health care facility  Z00.00 V70.0    2. Controlled type 2 diabetes mellitus without complication, without long-term current use of insulin (HCC)  E11.9 250.00    3. Essential hypertension with goal blood pressure less than 130/80  I10 401.9    4. Mixed hyperlipidemia  E78.2 272.2    5. Vitamin D deficiency  E55.9 268.9    6. Adenomatous polyp of colon, unspecified part of colon  D12.6 211.3 REFERRAL TO GASTROENTEROLOGY   7.  Family history of colon cancer  Z80.0 V16.0 REFERRAL TO GASTROENTEROLOGY   Assessment:  Type 2 diabetes well controlled  Hypertension controlled  Satisfactory lipid and vitamin D levels  History adenomatous polyp of the colon and family history of colon cancer due for colonoscopy    Health maintenance recommendations:  COVID-19 booster due  Tdap immunization reported as current by the patient  Colonoscopy due    Plan:  Recommend drinking more water  Referral for colonoscopy  Continue current medications  Avoid dietary salt, starch and sugar and follow program of regular aerobic exercise  Return for follow-up with in office hemoglobin A1c in 6 months or sooner with any problems    Rio Back MD      PLEASE NOTE:   This document has been produced using voice recognition software. Unrecognized errors in transcription may be present.

## 2022-04-06 ENCOUNTER — OFFICE VISIT (OUTPATIENT)
Dept: FAMILY MEDICINE CLINIC | Age: 57
End: 2022-04-06
Payer: COMMERCIAL

## 2022-04-06 VITALS
TEMPERATURE: 97.6 F | DIASTOLIC BLOOD PRESSURE: 80 MMHG | BODY MASS INDEX: 27.89 KG/M2 | RESPIRATION RATE: 16 BRPM | WEIGHT: 184 LBS | OXYGEN SATURATION: 100 % | SYSTOLIC BLOOD PRESSURE: 126 MMHG | HEIGHT: 68 IN | HEART RATE: 97 BPM

## 2022-04-06 DIAGNOSIS — Z00.00 ROUTINE GENERAL MEDICAL EXAMINATION AT A HEALTH CARE FACILITY: Primary | ICD-10-CM

## 2022-04-06 DIAGNOSIS — D12.6 ADENOMATOUS POLYP OF COLON, UNSPECIFIED PART OF COLON: ICD-10-CM

## 2022-04-06 DIAGNOSIS — E78.2 MIXED HYPERLIPIDEMIA: ICD-10-CM

## 2022-04-06 DIAGNOSIS — I10 ESSENTIAL HYPERTENSION WITH GOAL BLOOD PRESSURE LESS THAN 130/80: ICD-10-CM

## 2022-04-06 DIAGNOSIS — Z80.0 FAMILY HISTORY OF COLON CANCER: ICD-10-CM

## 2022-04-06 DIAGNOSIS — E55.9 VITAMIN D DEFICIENCY: ICD-10-CM

## 2022-04-06 DIAGNOSIS — E11.9 CONTROLLED TYPE 2 DIABETES MELLITUS WITHOUT COMPLICATION, WITHOUT LONG-TERM CURRENT USE OF INSULIN (HCC): ICD-10-CM

## 2022-04-06 PROCEDURE — 99396 PREV VISIT EST AGE 40-64: CPT | Performed by: FAMILY MEDICINE

## 2022-04-06 NOTE — PROGRESS NOTES
Brittany Blake is a 61 y.o. male (: 1965) presenting to address:    No chief complaint on file. Vitals:    22 0941   BP: 126/80   Pulse: 97   Resp: 16   Temp: 97.6 °F (36.4 °C)   TempSrc: Temporal   SpO2: 100%   Weight: 184 lb (83.5 kg)   Height: 5' 8\" (1.727 m)   PainSc:   0 - No pain       Hearing/Vision:   No exam data present    Learning Assessment:     Learning Assessment 2016   PRIMARY LEARNER Patient   HIGHEST LEVEL OF EDUCATION - PRIMARY LEARNER  2 YEARS OF COLLEGE   BARRIERS PRIMARY LEARNER NONE   CO-LEARNER CAREGIVER No   PRIMARY LANGUAGE ENGLISH   LEARNER PREFERENCE PRIMARY READING   ANSWERED BY patient   RELATIONSHIP SELF     Depression Screening:     3 most recent PHQ Screens 2022   PHQ Not Done -   Little interest or pleasure in doing things Not at all   Feeling down, depressed, irritable, or hopeless Not at all   Total Score PHQ 2 0     Fall Risk Assessment:     Fall Risk Assessment, last 12 mths 3/1/2021   Able to walk? Yes   Fall in past 12 months? 0   Do you feel unsteady? 0   Are you worried about falling 0     Abuse Screening:     Abuse Screening Questionnaire 2019   Do you ever feel afraid of your partner? N   Are you in a relationship with someone who physically or mentally threatens you? N   Is it safe for you to go home? Y     ADL Assessment:   No flowsheet data found. Coordination of Care Questionaire:   1. \"Have you been to the ER, urgent care clinic since your last visit? Hospitalized since your last visit? \" No    2. \"Have you seen or consulted any other health care providers outside of the [address] [place] [address] since your last visit? \" No     3. For patients aged 39-70: Has the patient had a colonoscopy? Yes - Care Gap present. Most recent result on file     2  Advanced Directive:   1. Do you have an Advanced Directive? NO    2. Would you like information on Advanced Directives?  NO

## 2022-04-06 NOTE — PATIENT INSTRUCTIONS
Assessment:  Type 2 diabetes well controlled  Hypertension controlled  Satisfactory lipid and vitamin D levels  History adenomatous polyp of the colon and family history of colon cancer due for colonoscopy    Health maintenance recommendations:  COVID-19 booster due  Tdap immunization reported as current by the patient  Colonoscopy due    Plan:  Recommend drinking more water  Referral for colonoscopy  Continue current medications  Avoid dietary salt, starch and sugar and follow program of regular aerobic exercise  Return for follow-up with in office hemoglobin A1c in 6 months or sooner with any problems

## 2022-06-21 DIAGNOSIS — I10 ESSENTIAL HYPERTENSION WITH GOAL BLOOD PRESSURE LESS THAN 130/80: ICD-10-CM

## 2022-06-21 RX ORDER — LOSARTAN POTASSIUM AND HYDROCHLOROTHIAZIDE 12.5; 1 MG/1; MG/1
1 TABLET ORAL DAILY
Qty: 90 TABLET | Refills: 1 | Status: SHIPPED | OUTPATIENT
Start: 2022-06-21

## 2022-10-24 ENCOUNTER — TELEPHONE (OUTPATIENT)
Dept: FAMILY MEDICINE CLINIC | Age: 57
End: 2022-10-24

## 2022-10-24 NOTE — TELEPHONE ENCOUNTER
----- Message from Yamileth Land sent at 10/21/2022  2:55 PM EDT -----  Subject: Message to Provider    QUESTIONS  Information for Provider? Patient would like to schedule his lab appt   before his appt on 11/8   ---------------------------------------------------------------------------  --------------  [id number] [employer]  [phone]; OK to leave message on voicemail  ---------------------------------------------------------------------------  --------------  SCRIPT ANSWERS  Relationship to Patient?  Self

## 2022-10-31 ENCOUNTER — TELEPHONE (OUTPATIENT)
Dept: FAMILY MEDICINE CLINIC | Age: 57
End: 2022-10-31

## 2022-10-31 NOTE — TELEPHONE ENCOUNTER
Pt called returning a call to Bianka from last week. Please look at 10/24/2022 encounter. I went ahead and scheduled him a lab appt but I do not see any orders placed for him. Please advise.      Future Appointments   Date Time Provider Michael Adamaris   11/3/2022  7:40 AM [facility] AMB   11/8/2022  2:00 PM Gil Perera MD [facility] AMB

## 2022-11-01 NOTE — TELEPHONE ENCOUNTER
The patient only needs an in office hemoglobin A1c at this appointment, he will need additional lab prior to his annual physical exam in April

## 2022-11-07 NOTE — PROGRESS NOTES
HISTORY OF PRESENT ILLNESS  Moises Young is a 61 y.o. male.   He returns for follow-up of type 2 diabetes with a history of hypertension, hyperlipidemia and vitamin D deficiency, most recently evaluated at his physical exam and April 2022 resulting in the following assessment and plan:  Assessment:  Type 2 diabetes well controlled  Hypertension controlled  Satisfactory lipid and vitamin D levels  History adenomatous polyp of the colon and family history of colon cancer due for colonoscopy     Health maintenance recommendations:  COVID-19 booster due  Tdap immunization reported as current by the patient  Colonoscopy due     Plan:  Recommend drinking more water  Referral for colonoscopy  Continue current medications  Avoid dietary salt, starch and sugar and follow program of regular aerobic exercise  Return for follow-up with in office hemoglobin A1c in 6 months or sooner with any problems    Mr#: [id number]      Past Medical History:   Diagnosis Date    Diabetes ([address] 2006    Hypercholesterolemia     Hypertension 2006    Sleep apnea        Past Surgical History:   Procedure Laterality Date    COLONOSCOPY N/A 3/31/2017    COLONOSCOPY w hot polypectomy performed by Kathy Dahl MD at [facility] ENDOSCOPY    HX COLONOSCOPY  04/26/2022    Tubular adenoma, 5-year follow-up, Dr. Nestor Persaud  2/2014       Family History   Problem Relation Age of Onset    Cancer Sister         rectal    Cancer Maternal Uncle         colon    Diabetes Maternal Uncle     Cancer Maternal Grandfather         colon    Diabetes Maternal Grandfather     Cancer Paternal Grandfather         unknown type     Heart Disease Neg Hx     Hypertension Neg Hx        Allergies   Allergen Reactions    Penicillins Rash       Social History     Tobacco Use   Smoking Status Former   Smokeless Tobacco Former    Quit date: 3/17/2015   Tobacco Comments    quit over a year ago        Social History     Substance and Sexual Activity   Alcohol Use Yes    Comment: occasionally            Patient Active Problem List   Diagnosis Code    Sleep apnea G47.30    Obesity (BMI 30.0-34. 9) E66.9    Family history of colon cancer Z80.0    Mixed hyperlipidemia E78.2    Primary hypertension I10    Vitamin D deficiency E55.9    Controlled type 2 diabetes mellitus without complication, without long-term current use of insulin (HCC) E11.9    Adenomatous colon polyp D12.6         Current Outpatient Medications:     atorvastatin (LIPITOR) 80 mg tablet, TAKE 1 TABLET BY MOUTH DAILY, Disp: 90 Tablet, Rfl: 3    metFORMIN (GLUCOPHAGE) 500 mg tablet, TAKE 1 TABLET BY MOUTH DAILY WITH SUPPER, Disp: 90 Tablet, Rfl: 3    losartan-hydroCHLOROthiazide (HYZAAR) 100-12.5 mg per tablet, TAKE 1 TABLET BY MOUTH DAILY, Disp: 90 Tablet, Rfl: 1    ergocalciferol (ERGOCALCIFEROL) 1,250 mcg (50,000 unit) capsule, TAKE 1 CAPSULE BY MOUTH EVERY 7 DAYS, Disp: 12 Capsule, Rfl: 3       Review of Systems   Constitutional:  Negative for fever and weight loss. Eyes:  Negative for blurred vision. Respiratory:  Negative for shortness of breath. Cardiovascular:  Negative for chest pain, palpitations and leg swelling. Genitourinary:  Negative for frequency. Neurological:  Positive for tingling (a little better). Negative for dizziness and headaches. Visit Vitals  /70 (BP 1 Location: Left upper arm, BP Patient Position: Sitting, BP Cuff Size: Adult)   Pulse (!) 104   Temp 98 °F (36.7 °C) (Temporal)   Resp 16   Ht 5' 8\" (1.727 m)   Wt 187 lb 3.2 oz (84.9 kg)   SpO2 98%   BMI 28.46 kg/m²       Physical Exam  Vitals and nursing note reviewed. Constitutional:       General: He is not in acute distress. Appearance: Normal appearance. He is well-developed. He is not ill-appearing. HENT:      Head: Normocephalic. Eyes:      Extraocular Movements: Extraocular movements intact. Cardiovascular:      Rate and Rhythm: Normal rate.    Pulmonary:      Effort: Pulmonary effort is normal. Neurological:      Mental Status: He is alert and oriented to person, place, and time. Psychiatric:         Mood and Affect: Mood normal.         Behavior: Behavior normal.       ASSESSMENT and PLAN    ICD-10-CM ICD-9-CM    1. Controlled type 2 diabetes mellitus without complication, without long-term current use of insulin (HCC)  E11.9 250.00 AMB POC HEMOGLOBIN A1C      2. Primary hypertension  I10 401.9       3. Mixed hyperlipidemia  E78.2 272.2       4. Vitamin D deficiency  E55.9 268.9       5. Needs flu shot  Z23 V04.81 INFLUENZA, FLUARIX, FLULAVAL, FLUZONE (AGE 6 MO+), AFLURIA(AGE 3Y+) IM, PF, 0.5 ML      Assessment:  Type 2 diabetes well controlled with hemoglobin A1c of 6.4%  Hypertension well controlled  Satisfactory lipid levels and vitamin D level at annual check 6 months ago    Health maintenance recommendations:  COVID-19 immunization with bivalent vaccine  Influenza immunization-today    Plan:  Continue current medications  Continue to avoid dietary salt, starch and sugar and follow program of regular aerobic exercise  Return for lab appointment followed by annual physical exam appointment after 4/6/2023, return sooner with any problems  Please always arrive at least 15 minutes before your scheduled appointment time  Celena Delgado MD      PLEASE NOTE:   This document has been produced using voice recognition software. Unrecognized errors in transcription may be present.

## 2022-11-08 ENCOUNTER — OFFICE VISIT (OUTPATIENT)
Dept: FAMILY MEDICINE CLINIC | Age: 57
End: 2022-11-08
Payer: COMMERCIAL

## 2022-11-08 VITALS
SYSTOLIC BLOOD PRESSURE: 132 MMHG | OXYGEN SATURATION: 98 % | TEMPERATURE: 98 F | BODY MASS INDEX: 28.37 KG/M2 | WEIGHT: 187.2 LBS | HEART RATE: 104 BPM | RESPIRATION RATE: 16 BRPM | HEIGHT: 68 IN | DIASTOLIC BLOOD PRESSURE: 70 MMHG

## 2022-11-08 DIAGNOSIS — E11.9 CONTROLLED TYPE 2 DIABETES MELLITUS WITHOUT COMPLICATION, WITHOUT LONG-TERM CURRENT USE OF INSULIN (HCC): Primary | ICD-10-CM

## 2022-11-08 DIAGNOSIS — I10 PRIMARY HYPERTENSION: ICD-10-CM

## 2022-11-08 DIAGNOSIS — E55.9 VITAMIN D DEFICIENCY: ICD-10-CM

## 2022-11-08 DIAGNOSIS — Z23 NEEDS FLU SHOT: ICD-10-CM

## 2022-11-08 DIAGNOSIS — E78.2 MIXED HYPERLIPIDEMIA: ICD-10-CM

## 2022-11-08 LAB — HBA1C MFR BLD HPLC: 6.4 %

## 2022-11-08 PROCEDURE — 3044F HG A1C LEVEL LT 7.0%: CPT | Performed by: FAMILY MEDICINE

## 2022-11-08 PROCEDURE — 3078F DIAST BP <80 MM HG: CPT | Performed by: FAMILY MEDICINE

## 2022-11-08 PROCEDURE — 3074F SYST BP LT 130 MM HG: CPT | Performed by: FAMILY MEDICINE

## 2022-11-08 PROCEDURE — 99213 OFFICE O/P EST LOW 20 MIN: CPT | Performed by: FAMILY MEDICINE

## 2022-11-08 PROCEDURE — 90471 IMMUNIZATION ADMIN: CPT | Performed by: FAMILY MEDICINE

## 2022-11-08 PROCEDURE — 83036 HEMOGLOBIN GLYCOSYLATED A1C: CPT | Performed by: FAMILY MEDICINE

## 2022-11-08 PROCEDURE — 90686 IIV4 VACC NO PRSV 0.5 ML IM: CPT | Performed by: FAMILY MEDICINE

## 2022-11-08 NOTE — PROGRESS NOTES
Tal Koch is a 61 y.o. male (: 1965) presenting to address:    Chief Complaint   Patient presents with    Diabetes     Follow up       Vitals:    22 1355   BP: 132/70   Pulse: (!) 104   Resp: 16   Temp: 98 °F (36.7 °C)   TempSrc: Temporal   SpO2: 98%   Weight: 187 lb 3.2 oz (84.9 kg)   Height: 5' 8\" (1.727 m)   PainSc:   0 - No pain       Hearing/Vision:   No results found. Learning Assessment:     Learning Assessment 2016   PRIMARY LEARNER Patient   HIGHEST LEVEL OF EDUCATION - PRIMARY LEARNER  2 YEARS OF COLLEGE   BARRIERS PRIMARY LEARNER NONE   CO-LEARNER CAREGIVER No   PRIMARY LANGUAGE ENGLISH   LEARNER PREFERENCE PRIMARY READING   ANSWERED BY patient   RELATIONSHIP SELF     Depression Screening:     3 most recent PHQ Screens 2022   PHQ Not Done -   Little interest or pleasure in doing things Not at all   Feeling down, depressed, irritable, or hopeless Not at all   Total Score PHQ 2 0     Fall Risk Assessment:     Fall Risk Assessment, last 12 mths 2022   Able to walk? Yes   Fall in past 12 months? 0   Do you feel unsteady? 0   Are you worried about falling 0     Abuse Screening:     Abuse Screening Questionnaire 2022   Do you ever feel afraid of your partner? N   Are you in a relationship with someone who physically or mentally threatens you? N   Is it safe for you to go home? Y     ADL Assessment:   No flowsheet data found. Coordination of Care Questionaire:   1. \"Have you been to the ER, urgent care clinic since your last visit? Hospitalized since your last visit? \" No    2. \"Have you seen or consulted any other health care providers outside of the [address] [place] [address] since your last visit? \" Yes Where: [facility]      3. For patients aged 39-70: Has the patient had a colonoscopy? Yes - no Care Gap present     Advanced Directive:   1. Do you have an Advanced Directive? NO    2. Would you like information on Advanced Directives?  NO

## 2022-11-08 NOTE — PROGRESS NOTES
Flu shot Immunization/s administered 11/8/2022 by Kareem Manzo LPN with guardian's consent. Patient tolerated procedure well. No reactions noted.

## 2022-11-08 NOTE — PATIENT INSTRUCTIONS
Assessment:  Type 2 diabetes well controlled with hemoglobin A1c of 6.4%  Hypertension well controlled  Satisfactory lipid levels and vitamin D level at annual check 6 months ago    Health maintenance recommendations:  COVID-19 immunization with bivalent vaccine  Influenza immunization-today    Plan:  Continue current medications  Continue to avoid dietary salt, starch and sugar and follow program of regular aerobic exercise  Return for lab appointment followed by annual physical exam appointment after 4/6/2023, return sooner with any problems  Please always arrive at least 15 minutes before your scheduled appointment time

## 2022-11-28 DIAGNOSIS — E55.9 VITAMIN D DEFICIENCY: ICD-10-CM

## 2022-11-28 RX ORDER — ERGOCALCIFEROL 1.25 MG/1
CAPSULE ORAL
Qty: 12 CAPSULE | Refills: 3 | Status: SHIPPED | OUTPATIENT
Start: 2022-11-28

## 2023-01-03 ENCOUNTER — OFFICE VISIT (OUTPATIENT)
Dept: FAMILY MEDICINE CLINIC | Age: 58
End: 2023-01-03
Payer: COMMERCIAL

## 2023-01-03 VITALS
HEART RATE: 89 BPM | RESPIRATION RATE: 16 BRPM | TEMPERATURE: 97.1 F | HEIGHT: 68 IN | OXYGEN SATURATION: 98 % | DIASTOLIC BLOOD PRESSURE: 78 MMHG | WEIGHT: 189 LBS | BODY MASS INDEX: 28.64 KG/M2 | SYSTOLIC BLOOD PRESSURE: 122 MMHG

## 2023-01-03 DIAGNOSIS — I10 ESSENTIAL HYPERTENSION WITH GOAL BLOOD PRESSURE LESS THAN 130/80: ICD-10-CM

## 2023-01-03 DIAGNOSIS — M54.16 RIGHT LUMBAR RADICULOPATHY: Primary | ICD-10-CM

## 2023-01-03 DIAGNOSIS — E11.9 CONTROLLED TYPE 2 DIABETES MELLITUS WITHOUT COMPLICATION, WITHOUT LONG-TERM CURRENT USE OF INSULIN (HCC): ICD-10-CM

## 2023-01-03 PROCEDURE — 99213 OFFICE O/P EST LOW 20 MIN: CPT | Performed by: FAMILY MEDICINE

## 2023-01-03 PROCEDURE — 3078F DIAST BP <80 MM HG: CPT | Performed by: FAMILY MEDICINE

## 2023-01-03 PROCEDURE — 3074F SYST BP LT 130 MM HG: CPT | Performed by: FAMILY MEDICINE

## 2023-01-03 RX ORDER — DEXAMETHASONE 1 MG/1
TABLET ORAL
Qty: 46 TABLET | Refills: 0 | Status: SHIPPED | OUTPATIENT
Start: 2023-01-03

## 2023-01-03 RX ORDER — LOSARTAN POTASSIUM AND HYDROCHLOROTHIAZIDE 12.5; 1 MG/1; MG/1
1 TABLET ORAL DAILY
Qty: 90 TABLET | Refills: 1 | Status: SHIPPED | OUTPATIENT
Start: 2023-01-03

## 2023-01-03 NOTE — PATIENT INSTRUCTIONS
Assessment:  Right-sided low back pain with with radiculopathy  Type 2 diabetes well controlled as of 11/2022 at which time his hemoglobin A1c was 6.4%    Plan:  Begin dexamethasone 1 mg, 6 tablets daily in AM with food x 4 days, 4 tablets daily in AM with food x 4 days, 2 tablets daily in AM with food x 2 days, 1 tablet daily in AM with food x 2 days, then stop  Expect temporary increase in blood sugar levels  Rest in appropriate positions and apply moist heat  Return for follow-up in 2 weeks or sooner with new or worsening symptoms

## 2023-01-03 NOTE — LETTER
NOTIFICATION RETURN TO WORK / SCHOOL    1/3/2023 11:02 AM    Mr. Sander Pierre  [place] [address]      To Whom It May Concern:    Sander Pierre is currently under the care of [address] [place] [address]. He will return to work/school on: ***    If there are questions or concerns please have the patient contact our office.         Sincerely,      Miroslava Arizmendi MD

## 2023-01-03 NOTE — PROGRESS NOTES
HISTORY OF PRESENT ILLNESS  Betsy Munoz is a 61 y.o. male. He reports a 4-day history of right lumbar pain with radiation of the pain into the right lower extremity. He indicates that this has been happening intermittently for years. He notes that he has been given steroid injections in the past but what he describes does not sound like these were epidural steroid injections. He has not had any evaluation or injection in over 2 years. It does not sound as though he has ever had a lumbar MR scan. He has experienced occasional numbness in the right lower extremity. He does not really endorse weakness. He has no change in bowel or bladder control. Mr#: [id number]      Past Medical History:   Diagnosis Date    Diabetes ([facility] [address] 2006    Hypercholesterolemia     Hypertension 2006    Sleep apnea        Past Surgical History:   Procedure Laterality Date    COLONOSCOPY N/A 3/31/2017    COLONOSCOPY w hot polypectomy performed by Chris Larios MD at [facility] ENDOSCOPY    HX COLONOSCOPY  04/26/2022    Tubular adenoma, 5-year follow-up, Dr. Ivy Herron  2/2014       Family History   Problem Relation Age of Onset    Cancer Sister         rectal    Cancer Maternal Uncle         colon    Diabetes Maternal Uncle     Cancer Maternal Grandfather         colon    Diabetes Maternal Grandfather     Cancer Paternal Grandfather         unknown type     Heart Disease Neg Hx     Hypertension Neg Hx        Allergies   Allergen Reactions    Penicillins Rash       Social History     Tobacco Use   Smoking Status Former   Smokeless Tobacco Former    Quit date: 3/17/2015   Tobacco Comments    quit over a year ago        Social History     Substance and Sexual Activity   Alcohol Use Yes    Comment: occasionally            Patient Active Problem List   Diagnosis Code    Sleep apnea G47.30    Obesity (BMI 30.0-34. 9) E66.9    Family history of colon cancer Z80.0    Mixed hyperlipidemia E78.2    Primary hypertension I10 Vitamin D deficiency E55.9    Controlled type 2 diabetes mellitus without complication, without long-term current use of insulin (HCC) E11.9    Adenomatous colon polyp D12.6         Current Outpatient Medications:     ergocalciferol (ERGOCALCIFEROL) 1,250 mcg (50,000 unit) capsule, TAKE 1 CAPSULE BY MOUTH EVERY 7 DAYS, Disp: 12 Capsule, Rfl: 3    atorvastatin (LIPITOR) 80 mg tablet, TAKE 1 TABLET BY MOUTH DAILY, Disp: 90 Tablet, Rfl: 3    metFORMIN (GLUCOPHAGE) 500 mg tablet, TAKE 1 TABLET BY MOUTH DAILY WITH SUPPER, Disp: 90 Tablet, Rfl: 3    losartan-hydroCHLOROthiazide (HYZAAR) 100-12.5 mg per tablet, TAKE 1 TABLET BY MOUTH DAILY, Disp: 90 Tablet, Rfl: 1       Review of Systems   Constitutional:  Negative for fever. Cardiovascular:  Negative for chest pain and palpitations. Musculoskeletal:  Positive for back pain (Right lumbar pain which radiates into the right lower extremity). Neurological:  Negative for tingling and focal weakness. Visit Vitals  /78   Pulse 89   Temp 97.1 °F (36.2 °C) (Temporal)   Resp 16   Ht 5' 8\" (1.727 m)   Wt 189 lb (85.7 kg)   SpO2 98%   BMI 28.74 kg/m²       Physical Exam  Vitals and nursing note reviewed. Constitutional:       General: He is not in acute distress. Appearance: Normal appearance. He is well-developed. He is not ill-appearing. HENT:      Head: Normocephalic. Eyes:      Extraocular Movements: Extraocular movements intact. Cardiovascular:      Rate and Rhythm: Normal rate. Pulmonary:      Effort: Pulmonary effort is normal.   Musculoskeletal:      Right lower leg: No edema. Left lower leg: No edema. Comments: Back exam reveals no tenderness to palpation, a positive straight leg raise at about 40 degrees on the right, negative on the left , LE muscle strength grossly intact and symmetrical, patellar DTRs are symmetrical       Skin:     General: Skin is warm and dry.    Neurological:      Mental Status: He is alert and oriented to person, place, and time. Gait: Gait normal.      Deep Tendon Reflexes: Reflexes normal.   Psychiatric:         Mood and Affect: Mood normal.         Behavior: Behavior normal.       ASSESSMENT and PLAN    ICD-10-CM ICD-9-CM    1. Right lumbar radiculopathy  M54.16 724.4 dexAMETHasone (DECADRON) 1 mg tablet      2. Controlled type 2 diabetes mellitus without complication, without long-term current use of insulin (HCC)  E11.9 250.00       3. Essential hypertension with goal blood pressure less than 130/80  I10 401.9 losartan-hydroCHLOROthiazide (HYZAAR) 100-12.5 mg per tablet      Assessment:  Right-sided low back pain with with radiculopathy  Type 2 diabetes well controlled as of 11/2022 at which time his hemoglobin A1c was 6.4%  Hypertension well controlled    Plan:  Begin dexamethasone 1 mg, 6 tablets daily in AM with food x 4 days, 4 tablets daily in AM with food x 4 days, 2 tablets daily in AM with food x 2 days, 1 tablet daily in AM with food x 2 days, then stop  Rest in appropriate positions and apply moist heat  Return for follow-up in 2 weeks or sooner with new or worsening symptoms    Continue other current medications  Return for preoperative evaluation on 2/1/2023 as scheduled, return sooner with any problems  Please always arrive at least 15 minutes before your scheduled appointment time  Ayush Watts MD      PLEASE NOTE:   This document has been produced using voice recognition software. Unrecognized errors in transcription may be present.

## 2023-01-03 NOTE — PROGRESS NOTES
Umesh Miller is a 61 y.o. male (: 1965) presenting to address:    Chief Complaint   Patient presents with    Back Pain     Low back pain started 4 days . Vitals:    23 1033   BP: 122/78   Pulse: 89   Resp: 16   Temp: 97.1 °F (36.2 °C)   TempSrc: Temporal   SpO2: 98%   Weight: 189 lb (85.7 kg)   Height: 5' 8\" (1.727 m)   PainSc:   7   PainLoc: Back       Hearing/Vision:   No results found. Learning Assessment:     Learning Assessment 2016   PRIMARY LEARNER Patient   HIGHEST LEVEL OF EDUCATION - PRIMARY LEARNER  2 YEARS OF COLLEGE   BARRIERS PRIMARY LEARNER NONE   CO-LEARNER CAREGIVER No   PRIMARY LANGUAGE ENGLISH   LEARNER PREFERENCE PRIMARY READING   ANSWERED BY patient   RELATIONSHIP SELF     Depression Screening:     3 most recent PHQ Screens 1/3/2023   PHQ Not Done -   Little interest or pleasure in doing things Not at all   Feeling down, depressed, irritable, or hopeless Not at all   Total Score PHQ 2 0     Fall Risk Assessment:     Fall Risk Assessment, last 12 mths 2022   Able to walk? Yes   Fall in past 12 months? 0   Do you feel unsteady? 0   Are you worried about falling 0     Abuse Screening:     Abuse Screening Questionnaire 2022   Do you ever feel afraid of your partner? N   Are you in a relationship with someone who physically or mentally threatens you? N   Is it safe for you to go home? Y     ADL Assessment:   No flowsheet data found. Coordination of Care Questionaire:   1. \"Have you been to the ER, urgent care clinic since your last visit? Hospitalized since your last visit? \" No    2. \"Have you seen or consulted any other health care providers outside of the [address] [place] [address] since your last visit? \" No     3. For patients aged 39-70: Has the patient had a colonoscopy? Yes - no Care Gap present       Advanced Directive:   1. Do you have an Advanced Directive? NO    2. Would you like information on Advanced Directives?  NO

## 2023-01-16 NOTE — PROGRESS NOTES
HISTORY OF PRESENT ILLNESS  Collette Montero is a 61 y.o. male. He returns for follow-up after evaluation 2 weeks ago with symptoms and exam findings consistent with right lumbar radiculopathy resulting in the following assessment and plan:  Assessment:  Right-sided low back pain with with radiculopathy  Type 2 diabetes well controlled as of 11/2022 at which time his hemoglobin A1c was 6.4%  Hypertension well controlled    Plan:  Begin dexamethasone 1 mg, 6 tablets daily in AM with food x 4 days, 4 tablets daily in AM with food x 4 days, 2 tablets daily in AM with food x 2 days, 1 tablet daily in AM with food x 2 days, then stop  Rest in appropriate positions and apply moist heat  Return for follow-up in 2 weeks or sooner with new or worsening symptoms    Continue other current medications    Today he reports improvement in his right-sided radicular symptoms since his recent flare but he continues to have radiation of pain from the right lumbar area all the way to the foot. He has returned to work.     Mr#: [id number]      Past Medical History:   Diagnosis Date    Diabetes ([address] 2006    Hypercholesterolemia     Hypertension 2006    Sleep apnea        Past Surgical History:   Procedure Laterality Date    COLONOSCOPY N/A 3/31/2017    COLONOSCOPY w hot polypectomy performed by Deejay Tipton MD at [facility] ENDOSCOPY    HX COLONOSCOPY  04/26/2022    Tubular adenoma, 5-year follow-up, Dr. Adriana Del Rosario  2/2014       Family History   Problem Relation Age of Onset    Cancer Sister         rectal    Cancer Maternal Uncle         colon    Diabetes Maternal Uncle     Cancer Maternal Grandfather         colon    Diabetes Maternal Grandfather     Cancer Paternal Grandfather         unknown type     Heart Disease Neg Hx     Hypertension Neg Hx        Allergies   Allergen Reactions    Penicillins Rash       Social History     Tobacco Use   Smoking Status Former   Smokeless Tobacco Former    Quit date: 3/17/2015 Tobacco Comments    quit over a year ago        Social History     Substance and Sexual Activity   Alcohol Use Yes    Comment: occasionally            Patient Active Problem List   Diagnosis Code    Sleep apnea G47.30    Obesity (BMI 30.0-34. 9) E66.9    Family history of colon cancer Z80.0    Mixed hyperlipidemia E78.2    Primary hypertension I10    Vitamin D deficiency E55.9    Controlled type 2 diabetes mellitus without complication, without long-term current use of insulin (HCC) E11.9    Adenomatous colon polyp D12.6         Current Outpatient Medications:     losartan-hydroCHLOROthiazide (HYZAAR) 100-12.5 mg per tablet, Take 1 Tablet by mouth daily. , Disp: 90 Tablet, Rfl: 1    ergocalciferol (ERGOCALCIFEROL) 1,250 mcg (50,000 unit) capsule, TAKE 1 CAPSULE BY MOUTH EVERY 7 DAYS, Disp: 12 Capsule, Rfl: 3    atorvastatin (LIPITOR) 80 mg tablet, TAKE 1 TABLET BY MOUTH DAILY, Disp: 90 Tablet, Rfl: 3    metFORMIN (GLUCOPHAGE) 500 mg tablet, TAKE 1 TABLET BY MOUTH DAILY WITH SUPPER, Disp: 90 Tablet, Rfl: 3       Review of Systems   Constitutional:  Negative for fever and weight loss. Cardiovascular:  Negative for chest pain and palpitations. Musculoskeletal:  Positive for back pain (Improved following the dexamethasone taper but not resolved). Neurological:  Positive for focal weakness (Right leg feels weak after long periods of time on his feet). Negative for sensory change. Visit Vitals  /80   Pulse 96   Temp 98 °F (36.7 °C) (Temporal)   Resp 16   Ht 5' 8\" (1.727 m)   Wt 190 lb (86.2 kg)   SpO2 98%   BMI 28.89 kg/m²       Physical Exam  Vitals and nursing note reviewed. Constitutional:       General: He is not in acute distress. Appearance: Normal appearance. He is well-developed. He is not ill-appearing. HENT:      Head: Normocephalic. Eyes:      Extraocular Movements: Extraocular movements intact. Cardiovascular:      Rate and Rhythm: Normal rate.    Pulmonary:      Effort: Pulmonary effort is normal.   Musculoskeletal:      Comments: Back exam reveals central lower lumbar tenderness to palpation, positive straight leg raise at 30 degrees on the right and negative RAISA bilaterally, LE muscle strength grossly intact and symmetrical       Neurological:      Mental Status: He is alert and oriented to person, place, and time. Gait: Gait normal.      Deep Tendon Reflexes: Reflexes normal.   Psychiatric:         Mood and Affect: Mood normal.         Behavior: Behavior normal.         Thought Content: Thought content normal.       ASSESSMENT and PLAN    ICD-10-CM ICD-9-CM    1. Right lumbar radiculopathy  M54.16 724.4 MRI LUMB SPINE WO CONT      REFERRAL TO PHYSICAL THERAPY      Assessment:  Right lumbar pain with radiculopathy symptoms improved since recent flare but still present    Health maintenance recommendations:  COVID-19 immunization with bivalent vaccine anytime 2 weeks after last dose of dexamethasone    Plan:  MR scan  Physical therapy referral  Return for preoperative evaluation on 2/1/2023 as scheduled, return sooner with any problems  Please always arrive at least 15 minutes before your scheduled appointment time  Shelley Guidry MD      PLEASE NOTE:   This document has been produced using voice recognition software. Unrecognized errors in transcription may be present.

## 2023-01-17 ENCOUNTER — OFFICE VISIT (OUTPATIENT)
Dept: FAMILY MEDICINE CLINIC | Age: 58
End: 2023-01-17
Payer: COMMERCIAL

## 2023-01-17 VITALS
HEIGHT: 68 IN | SYSTOLIC BLOOD PRESSURE: 120 MMHG | OXYGEN SATURATION: 98 % | TEMPERATURE: 98 F | DIASTOLIC BLOOD PRESSURE: 80 MMHG | HEART RATE: 96 BPM | BODY MASS INDEX: 28.79 KG/M2 | RESPIRATION RATE: 16 BRPM | WEIGHT: 190 LBS

## 2023-01-17 DIAGNOSIS — M54.16 RIGHT LUMBAR RADICULOPATHY: Primary | ICD-10-CM

## 2023-01-17 PROCEDURE — 3079F DIAST BP 80-89 MM HG: CPT | Performed by: FAMILY MEDICINE

## 2023-01-17 PROCEDURE — 99213 OFFICE O/P EST LOW 20 MIN: CPT | Performed by: FAMILY MEDICINE

## 2023-01-17 PROCEDURE — 3074F SYST BP LT 130 MM HG: CPT | Performed by: FAMILY MEDICINE

## 2023-01-17 NOTE — PROGRESS NOTES
Vidal Primrose is a 61 y.o. male (: 1965) presenting to address:    Chief Complaint   Patient presents with    Back Pain       Vitals:    23 1134   BP: 120/80   Pulse: 96   Resp: 16   Temp: 98 °F (36.7 °C)   TempSrc: Temporal   SpO2: 98%   Weight: 190 lb (86.2 kg)   Height: 5' 8\" (1.727 m)   PainSc:   5   PainLoc: Back       Hearing/Vision:   No results found. Learning Assessment:     Learning Assessment 2016   PRIMARY LEARNER Patient   HIGHEST LEVEL OF EDUCATION - PRIMARY LEARNER  2 YEARS OF COLLEGE   BARRIERS PRIMARY LEARNER NONE   CO-LEARNER CAREGIVER No   PRIMARY LANGUAGE ENGLISH   LEARNER PREFERENCE PRIMARY READING   ANSWERED BY patient   RELATIONSHIP SELF     Depression Screening:     3 most recent PHQ Screens 2023   PHQ Not Done -   Little interest or pleasure in doing things Not at all   Feeling down, depressed, irritable, or hopeless Not at all   Total Score PHQ 2 0     Fall Risk Assessment:     Fall Risk Assessment, last 12 mths 2022   Able to walk? Yes   Fall in past 12 months? 0   Do you feel unsteady? 0   Are you worried about falling 0     Abuse Screening:     Abuse Screening Questionnaire 2022   Do you ever feel afraid of your partner? N   Are you in a relationship with someone who physically or mentally threatens you? N   Is it safe for you to go home? Y     ADL Assessment:   No flowsheet data found. Coordination of Care Questionaire:   1. \"Have you been to the ER, urgent care clinic since your last visit? Hospitalized since your last visit? \" No    2. \"Have you seen or consulted any other health care providers outside of the [address] [place] [address] since your last visit? \" No     3. For patients aged 39-70: Has the patient had a colonoscopy? Yes - no Care Gap present       Advanced Directive:   1. Do you have an Advanced Directive? NO    2. Would you like information on Advanced Directives?  NO

## 2023-01-17 NOTE — PATIENT INSTRUCTIONS
Assessment:  Right lumbar pain with radiculopathy symptoms improved since recent flare but still present    Health maintenance recommendations:  COVID-19 immunization with bivalent vaccine anytime 2 weeks after last dose of dexamethasone    Plan:  MR scan  Physical therapy referral  Return for preoperative evaluation on 2/1/2023 as scheduled, return sooner with any problems

## 2023-01-23 ENCOUNTER — TELEPHONE (OUTPATIENT)
Dept: FAMILY MEDICINE CLINIC | Age: 58
End: 2023-01-23

## 2023-01-23 NOTE — TELEPHONE ENCOUNTER
----- Message from Jeanna Leary sent at 1/23/2023  9:41 AM EST -----  Subject: Referral Request    Reason for referral request? Patient requesting his referral for an MRI be   sent to [facility][note 3], wants to get it done there. Provider patient wants to be referred to(if known):     Provider Phone Number(if known):     Additional Information for Provider?   ---------------------------------------------------------------------------  --------------  [address]    [phone]; OK to leave message on voicemail  ---------------------------------------------------------------------------  --------------

## 2023-01-23 NOTE — TELEPHONE ENCOUNTER
Left message to notify patient his order was faxed to Corey Richter and he should call [phone] to schedule .

## 2023-01-26 ENCOUNTER — HOSPITAL ENCOUNTER (OUTPATIENT)
Dept: PHYSICAL THERAPY | Age: 58
Discharge: HOME | End: 2023-01-26
Payer: COMMERCIAL

## 2023-01-26 PROCEDURE — 97530 THERAPEUTIC ACTIVITIES: CPT

## 2023-01-26 PROCEDURE — 97161 PT EVAL LOW COMPLEX 20 MIN: CPT

## 2023-01-26 NOTE — PROGRESS NOTES
[address] [place] [address] PHYSICAL THERAPY AT [address] [place] [address]  [address] Tess[address], [address] ,[address], [address]  Phone: [phone]  Fax: [phone] / [address] [address] PHYSICAL THERAPY SERVICES  Patient Name: Demetra Dennis : 1965   Medical   Diagnosis: Other low back pain [M54.59] Treatment Diagnosis: LBP, R LE radiculopathy    Onset Date: 2023      Referral Source: Donna Ureña MD Start of [facility]): 2023   Prior Hospitalization: See medical history Provider #: [id number]   Prior Level of Function: Manageable sx with ADls/work activites   Comorbidities: None    Medications: Verified on Patient Summary List   The Plan of Care and following information is based on the information from the initial evaluation.   ==================================================================================  Assessment / key information:  Patient is a pleasant 61 y.o. male who presents to [facility] with LBP & R LE radiculopathy. Patient reports chronic episodic h/o LBP & R LE pain with worsening of sx this past 2023 after a road trip to [place]. He reports current c/o constant LBP & intermittent c/o R LE pain/tingling & numbness that radiates into his foot. His sx worsen when attempting to find position of comfort to sleep at night with reports of disrupted sleep. He reports additional c/o pain with prolonged sitting & ambulation. He is scheduled for an MRI on 23. He had received chiropractor treatments for 8-10 years 2xs/week until ~4 years ago with good reduction of sx, although his sx have never fully resolved. He also received an steroid injection ~ 4-5 years ago with good reduction of sx.   Average reported pain level at 4/10, 8/10 at worst & 210 at best.  Upon objective evaluation, patient demonstrates l/s AROM as follows flex 75%, ext 50% with ERP, L SGIS was pain-free, R SGIS reproduced mild LBP, no affect on R LE.  [facility] for lower quarter screen. SLR was mildly (+) on R for lower pain & prone knee flexion test was (+) on R for both LBP & R LE pain. LAUREN positioning increased LBP, unable to tolerate REIL. RFIL had no affect on sx today. Use of l/s roll had NE on sx today. Patient can benefit PT interventions to improve posture, decrease pain & centralize R LE sx to facilitate return to unlimited ADLs, work activities & overall functional status.   ==================================================================================  Eval Complexity: History LOW Complexity : Zero comorbidities / personal factors that will impact the outcome / POC;  Examination ; Presentation LOW Complexity : Stable, uncomplicated ;  Decision Making MEDIUM Complexity : FOTO score of 26-74; Overall Complexity LOW   Problem List: pain affecting function, decrease ROM, decrease strength, impaired gait/ balance, decrease ADL/ functional abilitiies, decrease activity tolerance, decrease flexibility/ joint mobility, and decrease transfer abilities   Treatment Plan may include any combination of the following: Therapeutic exercise, Neuromuscular reeducation, Manual therapy, Therapeutic activity, Self care/home management, Electric stim unattended , Vasopneumatic device, Aquatic therapy, Gait training, Ultrasound, Electric stim attended, Needle insertion w/o injection (1 or 2 muscles), and Needle insertion w/o injection (3+ muscles)  Patient / Family readiness to learn indicated by: asking questions, trying to perform skills and interest  Persons(s) to be included in education: patient (P)  Barriers to Learning/Limitations: None  Measures taken:    Patient Goal (s): \"Pain relief\"   Patient self reported health status: good  Rehabilitation Potential: good  Short Term Goals: To be accomplished in  2  weeks:  1) Establish HEP to prevent further disability.     2) Patient will report decreased c/o average pain to < or = 2-3/10 to facilitate prolonged sitting at desk with manageable sx in lower back. 3) Improve FOTO score from 42 points to > or = 50 points indicating improved tolerance with ADLs in regards to lower back. 4) Centralize R LE sx to level of posterior thigh to facilitate periods of prolonged driving without peripheralization of sx. Long Term Goals: To be accomplished in  4  weeks:  1) Improve FOTO score from 50 points to > or = 61 points indicating improved tolerance with ADLs in regards to lower back. 2) Patient will demonstrate (-) neural tension with SLR & prone knee flexion on R LE to facilitate driving activities with manageable sx. 3) Centralize R LE sx to level of l/s to facilitate return to pain-free work activities without peripheralization of sx. 4) Patient to report no c/o weakness in R LE when negotiating stairs or curbs. Frequency / Duration:   Patient to be seen  2-3  times per week for 4  weeks:  Patient / Caregiver education and instruction: self care, activity modification, brace/ splint application and exercises    Therapist Signature: JOHN Silver, cert MDT Date: 4/99/8649   Certification Period: NA Time: 4:24 PM   =================================================================================  I certify that the above Physical Therapy Services are being furnished while the patient is under my care. I agree with the treatment plan and certify that this therapy is necessary.     Physician Signature:                                                             Date:                                     Time:                                                                       Emiliano Allen MD

## 2023-01-27 NOTE — PROGRESS NOTES
PHYSICAL THERAPY - DAILY TREATMENT NOTE    Patient Name: Korin Cochran        Date: 2023  : 1965   YES Patient  Verified  Visit #:     Insurance: Payor: Amor Fernandes / Plan: [id number] [employer] / Product Type: PPO /      In time: 4:20 P Out time: 5:00 P   Total Treatment Time: 40     BCBS/Medicare Time Tracking (below)   Total Timed Codes (min):  40 1:1 Treatment Time:  40     TREATMENT AREA =  Other low back pain [M54.59]  SUBJECTIVE  Pain Level (on 0 to 10 scale):  3  / 10   Medication Changes/New allergies or changes in medical history, any new surgeries or procedures? NO    If yes, update Summary List   Subjective Functional Status/Changes:  []  No changes reported     See POC            Modalities Rationale:     Patient deferred     min [] Estim, type/location:                                      []  att     []  unatt     []  w/US     []  w/ice    []  w/heat    min []  Mechanical Traction: type/lbs                   []  pro   []  sup   []  int   []  cont    []  before manual    []  after manual    min []  Ultrasound, settings/location:      min []  Iontophoresis w/ dexamethasone, location:                                               []  take home patch       []  in clinic    min []  Ice     []  Heat    location/position:     min []  Vasopneumatic Device, press/temp:    If using vaso (only need to measure limb vaso being performed on)      pre-treatment girth :       post-treatment girth :       measured at (landmark location) :      min []  Other:    [] Skin assessment post-treatment (if applicable):    []  intact    []  redness- no adverse reaction                  []redness - adverse reaction:            10 min Therapeutic Activity: [x]  See flow sheet  Use of l/s roll for sitting to improve postural awareness, use of rest breaks during periods of prolonged sitting at desk during work day.    Rationale:    increase ROM, increase strength, and increase proprioception to improve the patients ability to return to pain-free sitting     Billed With/As:   [] TE   [] TA   [] Neuro   [] Self Care Patient Education: [x] Review HEP    [] Progressed/Changed HEP based on:   [] positioning   [] body mechanics   [] transfers   [] heat/ice application    [] other:      Other Objective/Functional Measures:    See POC     Post Treatment Pain Level (on 0 to 10) scale:   3  / 10     ASSESSMENT  Assessment/Changes in Function:     See POC      []  See Progress Note/Recertification   Patient will continue to benefit from skilled PT services to modify and progress therapeutic interventions, address functional mobility deficits, address ROM deficits, address strength deficits, analyze and address soft tissue restrictions, analyze and cue movement patterns, analyze and modify body mechanics/ergonomics, assess and modify postural abnormalities, and instruct in home and community integration to attain remaining goals.    Progress toward goals / Updated goals:    Progressing towards goals established at [address] [facility] [id number]  []  Upgrade activities as tolerated YES Continue plan of care   []  Discharge due to :    []  Other:      Therapist: Tiffany Nunes PT    Date: 1/26/2023 Time: 7:51 PM     Future Appointments   Date Time Provider Michael Bailon   1/31/2023  7:00 AM Viviana Harmon PT [facility]   2/1/2023  8:30 AM Donna Ureña MD [facility]   2/2/2023  4:30 PM Viviana Harmon PT [facility]   2/7/2023  6:00 PM [facility] PT [facility] 1 [facility]   2/9/2023  4:30 PM [facility] PT [facility] [address] [facility]   2[facility]14/2023  4:00 PM [facility] PT [facility] [address] [facility]   2/16/2023  4:30 PM Coreen Jacobsen PT [facility]   2/21/2023  4:30 PM Coreen Jacobsen, PT [facility]   2/23/2023  4:30 PM [facility] PT [facility] [address] [facility]   2/28/2023  4:30 PM [facility] PT [facility] [address] [facility]

## 2023-01-31 ENCOUNTER — HOSPITAL ENCOUNTER (OUTPATIENT)
Dept: PHYSICAL THERAPY | Age: 58
Discharge: HOME | End: 2023-01-31
Payer: COMMERCIAL

## 2023-01-31 PROCEDURE — 97112 NEUROMUSCULAR REEDUCATION: CPT

## 2023-01-31 PROCEDURE — 97530 THERAPEUTIC ACTIVITIES: CPT

## 2023-01-31 PROCEDURE — 97110 THERAPEUTIC EXERCISES: CPT

## 2023-01-31 NOTE — PROGRESS NOTES
PHYSICAL THERAPY - DAILY TREATMENT NOTE    Patient Name: Demetra Dennis        Date: 2023  : 1965   YES Patient  Verified  Visit #:   2   of   12  Insurance: Payor: Rudolph Reyes / Plan: [id number] [employer] / Product Type: PPO /      In time: 7:00 A Out time: 7:45 A   Total Treatment Time: 45     BCBS/Medicare Time Tracking (below)   Total Timed Codes (min):45 1:1 Treatment Time:  45     TREATMENT AREA =  Other low back pain [M54.59]    SUBJECTIVE  Pain Level (on 0 to 10 scale):  3  / 10   Medication Changes/New allergies or changes in medical history, any new surgeries or procedures? NO    If yes, update Summary List   Subjective Functional Status/Changes:  []  No changes reported     Patient reports no new complaints, he typically has  more pain in the mornings. He has some lower back pain today but no pain in his R leg.           Modalities Rationale:     Patient deferred   min [] Estim, type/location:                                      []  att     []  unatt     []  w/US     []  w/ice    []  w/heat    min []  Mechanical Traction: type/lbs                   []  pro   []  sup   []  int   []  cont    []  before manual    []  after manual    min []  Ultrasound, settings/location:      min []  Iontophoresis w/ dexamethasone, location:                                               []  take home patch       []  in clinic    min []  Ice     []  Heat    location/position:     min []  Vasopneumatic Device, press/temp:    If using vaso (only need to measure limb vaso being performed on)      pre-treatment girth :       post-treatment girth :       measured at (landmark location) :      min []  Other:    [] Skin assessment post-treatment (if applicable):    []  intact    []  redness- no adverse reaction                  []redness - adverse reaction:        10 min Therapeutic Exercise:  [x]  See flow sheet   Rationale:      increase ROM and increase strength to improve the patients ability to return to pain-free sitting for driving    20 min Neuromuscular Re-ed: [x]  See flow sheet   Rationale:    increase strength, improve coordination, and increase proprioception to improve the patients ability to return to unsupported sitting with improved postural awareness    15 min Therapeutic Activity: [x]  See flow sheet   Rationale:    increase ROM and increase strength to improve the patients ability to tolerate return to light lifting at home without increase in pain      Billed With/As:   [] TE   [] TA   [] Neuro   [] Self Care Patient Education: [x] Review HEP    [] Progressed/Changed HEP based on:   [] positioning   [] body mechanics   [] transfers   [] heat/ice application    [] other:      Other Objective/Functional Measures:    Initiated exercise per flow sheet (see updated HEP)   Post Treatment Pain Level (on 0 to 10) scale:   3  / 10     ASSESSMENT  Assessment/Changes in Function:     Good tolerance to initial program, no reproduction of R LE pain      []  See Progress Note/Recertification   Patient will continue to benefit from skilled PT services to modify and progress therapeutic interventions, address functional mobility deficits, address ROM deficits, address strength deficits, analyze and address soft tissue restrictions, analyze and cue movement patterns, analyze and modify body mechanics/ergonomics, assess and modify postural abnormalities, and instruct in home and community integration to attain remaining goals.    Progress toward goals / Updated goals:    Progressing towards STG 2 & 3, STG 1 met      PLAN  []  Upgrade activities as tolerated YES Continue plan of care   []  Discharge due to :    []  Other:      Therapist: Aminta Herron PT    Date: 1/31/2023 Time: 8:08 AM     Future Appointments   Date Time Provider Michael Bailon   2/1/2023  8:30 AM Andrew Crockett MD [facility]   2/2/2023  4:30 PM Nancy Payne, PT [facility]   2/7/2023  6:00 PM [facility] PT MAHSA Ewing [facility] 2/9/2023  4:30 PM Edilberto Morin, PT [facility]   2/14/2023  4:00 PM [facility] PT [facility] 1 [facility]   2/16/2023  4:30 PM Edilberto Morin, PT [facility]   2/21/2023  4:30 PM Edilberto Morin, PT [facility]   2/23/2023  4:30 PM [facility] PT [facility] 1 [facility]   2/28/2023  4:30 PM [facility] PT [facility] 1 [facility]

## 2023-01-31 NOTE — PROGRESS NOTES
HISTORY OF PRESENT ILLNESS  Ladan Galarza is a 61 y.o. male. He presents for preop evaluation prior to lateral blepharoplasties. Mr#: [id number]      Past Medical History:   Diagnosis Date    Diabetes ([address] 2006    Hypercholesterolemia     Hypertension 2006    Sleep apnea        Past Surgical History:   Procedure Laterality Date    COLONOSCOPY N/A 3/31/2017    COLONOSCOPY w hot polypectomy performed by Misha Aguilar MD at [facility] ENDOSCOPY    HX COLONOSCOPY  04/26/2022    Tubular adenoma, 5-year follow-up, Dr. Henson Round  2/2014       Family History   Problem Relation Age of Onset    Cancer Sister         rectal    Cancer Maternal Uncle         colon    Diabetes Maternal Uncle     Cancer Maternal Grandfather         colon    Diabetes Maternal Grandfather     Cancer Paternal Grandfather         unknown type     Heart Disease Neg Hx     Hypertension Neg Hx        Allergies   Allergen Reactions    Penicillins Rash       Social History     Tobacco Use   Smoking Status Former   Smokeless Tobacco Former    Quit date: 3/17/2015   Tobacco Comments    quit over a year ago        Social History     Substance and Sexual Activity   Alcohol Use Yes    Comment: occasionally            Patient Active Problem List   Diagnosis Code    Sleep apnea G47.30    Obesity (BMI 30.0-34. 9) E66.9    Family history of colon cancer Z80.0    Mixed hyperlipidemia E78.2    Primary hypertension I10    Vitamin D deficiency E55.9    Controlled type 2 diabetes mellitus without complication, without long-term current use of insulin (HCC) E11.9    Adenomatous colon polyp D12.6         Current Outpatient Medications:     losartan-hydroCHLOROthiazide (HYZAAR) 100-12.5 mg per tablet, Take 1 Tablet by mouth daily. , Disp: 90 Tablet, Rfl: 1    ergocalciferol (ERGOCALCIFEROL) 1,250 mcg (50,000 unit) capsule, TAKE 1 CAPSULE BY MOUTH EVERY 7 DAYS, Disp: 12 Capsule, Rfl: 3    atorvastatin (LIPITOR) 80 mg tablet, TAKE 1 TABLET BY MOUTH DAILY, Disp: 90 Tablet, Rfl: 3    metFORMIN (GLUCOPHAGE) 500 mg tablet, TAKE 1 TABLET BY MOUTH DAILY WITH SUPPER, Disp: 90 Tablet, Rfl: 3       Review of Systems   Constitutional:  Negative for fever. HENT:  Negative for congestion, ear pain, sinus pain and sore throat. Respiratory:  Negative for cough and shortness of breath. Cardiovascular:  Negative for chest pain and palpitations. Gastrointestinal:  Negative for diarrhea, nausea and vomiting. Genitourinary:  Negative for dysuria, frequency and urgency. Skin:  Negative for rash. Visit Vitals  /80   Pulse 99   Temp 97.8 °F (36.6 °C) (Temporal)   Resp 16   Ht 5' 8\" (1.727 m)   Wt 187 lb (84.8 kg)   SpO2 97%   BMI 28.43 kg/m²       Physical Exam  Vitals and nursing note reviewed. Constitutional:       General: He is not in acute distress. Appearance: Normal appearance. He is well-developed. He is not ill-appearing. HENT:      Head: Normocephalic. Eyes:      Extraocular Movements: Extraocular movements intact. Cardiovascular:      Rate and Rhythm: Normal rate and regular rhythm. Heart sounds: Normal heart sounds. Pulmonary:      Effort: Pulmonary effort is normal.      Breath sounds: Normal breath sounds. Abdominal:      Palpations: Abdomen is soft. Tenderness: There is no abdominal tenderness. Musculoskeletal:      Cervical back: Neck supple. Skin:     General: Skin is warm and dry. Neurological:      Mental Status: He is alert and oriented to person, place, and time. Psychiatric:         Mood and Affect: Mood normal.         Behavior: Behavior normal.         Thought Content: Thought content normal.       ASSESSMENT and PLAN    ICD-10-CM ICD-9-CM    1. Droopy eyelid, bilateral  H02.403 374.30       2.  Preop examination  Z01.818 V72.84       Assessment:  No medical contraindications to the planned procedure    Plan:  Medically cleared for surgery  Russell Maloney MD      PLEASE NOTE:   This document has been produced using voice recognition software. Unrecognized errors in transcription may be present.

## 2023-02-01 ENCOUNTER — OFFICE VISIT (OUTPATIENT)
Dept: FAMILY MEDICINE CLINIC | Age: 58
End: 2023-02-01
Payer: COMMERCIAL

## 2023-02-01 VITALS
DIASTOLIC BLOOD PRESSURE: 80 MMHG | RESPIRATION RATE: 16 BRPM | HEIGHT: 68 IN | SYSTOLIC BLOOD PRESSURE: 118 MMHG | BODY MASS INDEX: 28.34 KG/M2 | HEART RATE: 99 BPM | TEMPERATURE: 97.8 F | OXYGEN SATURATION: 97 % | WEIGHT: 187 LBS

## 2023-02-01 DIAGNOSIS — Z01.818 PREOP EXAMINATION: ICD-10-CM

## 2023-02-01 DIAGNOSIS — H02.403 DROOPY EYELID, BILATERAL: Primary | ICD-10-CM

## 2023-02-01 PROCEDURE — 99213 OFFICE O/P EST LOW 20 MIN: CPT | Performed by: FAMILY MEDICINE

## 2023-02-01 PROCEDURE — 3079F DIAST BP 80-89 MM HG: CPT | Performed by: FAMILY MEDICINE

## 2023-02-01 PROCEDURE — 3074F SYST BP LT 130 MM HG: CPT | Performed by: FAMILY MEDICINE

## 2023-02-01 NOTE — PROGRESS NOTES
Laila Quesada is a 61 y.o. male (: 1965) presenting to address:    Chief Complaint   Patient presents with    Pre-op Exam       Vitals:    23 0839   BP: 118/80   Pulse: 99   Resp: 16   Temp: 97.8 °F (36.6 °C)   TempSrc: Temporal   SpO2: 97%   Weight: 187 lb (84.8 kg)   Height: 5' 8\" (1.727 m)   PainSc:   2   PainLoc: Back       Hearing/Vision:   No results found. Learning Assessment:     Learning Assessment 2016   PRIMARY LEARNER Patient   HIGHEST LEVEL OF EDUCATION - PRIMARY LEARNER  2 YEARS OF COLLEGE   BARRIERS PRIMARY LEARNER NONE   CO-LEARNER CAREGIVER No   PRIMARY LANGUAGE ENGLISH   LEARNER PREFERENCE PRIMARY READING   ANSWERED BY patient   RELATIONSHIP SELF     Depression Screening:     3 most recent PHQ Screens 2023   PHQ Not Done -   Little interest or pleasure in doing things Not at all   Feeling down, depressed, irritable, or hopeless Not at all   Total Score PHQ 2 0     Fall Risk Assessment:     Fall Risk Assessment, last 12 mths 2022   Able to walk? Yes   Fall in past 12 months? 0   Do you feel unsteady? 0   Are you worried about falling 0     Abuse Screening:     Abuse Screening Questionnaire 2022   Do you ever feel afraid of your partner? N   Are you in a relationship with someone who physically or mentally threatens you? N   Is it safe for you to go home? Y     ADL Assessment:   No flowsheet data found. Coordination of Care Questionaire:   1. \"Have you been to the ER, urgent care clinic since your last visit? Hospitalized since your last visit? \" No    2. \"Have you seen or consulted any other health care providers outside of the [address] [place] [address] since your last visit? \" No     3. For patients aged 39-70: Has the patient had a colonoscopy? Yes - no Care Gap present       Advanced Directive:   1. Do you have an Advanced Directive? NO    2. Would you like information on Advanced Directives?  NO

## 2023-02-02 ENCOUNTER — HOSPITAL ENCOUNTER (OUTPATIENT)
Dept: PHYSICAL THERAPY | Age: 58
Discharge: HOME | End: 2023-02-02
Payer: COMMERCIAL

## 2023-02-02 PROCEDURE — 97112 NEUROMUSCULAR REEDUCATION: CPT

## 2023-02-02 PROCEDURE — 97530 THERAPEUTIC ACTIVITIES: CPT

## 2023-02-02 PROCEDURE — 97110 THERAPEUTIC EXERCISES: CPT

## 2023-02-02 NOTE — PROGRESS NOTES
PHYSICAL THERAPY - DAILY TREATMENT NOTE    Patient Name: Vidal Primrose        Date: 2023  : 1965   YES Patient  Verified  Visit #:   3   of   12  Insurance: Payor: Lex Mins / Plan: [id number] / Product Type: PPO /      In time: 4:30 P Out time: 5:20 P   Total Treatment Time: 45     BCBS/Medicare Time Tracking (below)   Total Timed Codes (min): 45 1:1 Treatment Time:  40     TREATMENT AREA =  Other low back pain [M54.59]    SUBJECTIVE  Pain Level (on 0 to 10 scale):  3  / 10   Medication Changes/New allergies or changes in medical history, any new surgeries or procedures? NO    If yes, update Summary List   Subjective Functional Status/Changes:  []  No changes reported     Patient reports no new complaints, he had a lot of pain in his R leg from driving today from work. He will have MRI next week.           Modalities Rationale:     Patient deferred   min [] Estim, type/location:                                      []  att     []  unatt     []  w/US     []  w/ice    []  w/heat    min []  Mechanical Traction: type/lbs                   []  pro   []  sup   []  int   []  cont    []  before manual    []  after manual    min []  Ultrasound, settings/location:      min []  Iontophoresis w/ dexamethasone, location:                                               []  take home patch       []  in clinic    min []  Ice     []  Heat    location/position:     min []  Vasopneumatic Device, press/temp:    If using vaso (only need to measure limb vaso being performed on)      pre-treatment girth :       post-treatment girth :       measured at (landmark location) :      min []  Other:    [] Skin assessment post-treatment (if applicable):    []  intact    []  redness- no adverse reaction                  []redness - adverse reaction:        10 min Therapeutic Exercise:  [x]  See flow sheet   Rationale:      increase ROM and increase strength to improve the patients ability to return to pain-free work activities while sitting at his work station     20/  15 min Neuromuscular Re-ed: [x]  See flow sheet   Rationale:    increase strength, improve coordination, and increase proprioception to improve the patients ability to return to unsupported sitting with improved postural awareness    15 min Therapeutic Activity: [x]  See flow sheet   Rationale:    increase ROM and increase strength to improve the patients ability to tolerate return to light lifting at home without increase in pain      Billed With/As:   [] TE   [] TA   [] Neuro   [] Self Care Patient Education: [x] Review HEP    [] Progressed/Changed HEP based on:   [] positioning   [] body mechanics   [] transfers   [] heat/ice application    [] other:      Other Objective/Functional Measures: Added supine & quadraped neural mobilization today (   Post Treatment Pain Level (on 0 to 10) scale:   3  / 10     ASSESSMENT  Assessment/Changes in Function:     No reproduction of pain with trial of neural mobilization in quadraped, c/o pain in R LE with supine neural mobilization, fair sitting posture on swiss ball, patient required tactile cues for upright posture in sitting      []  See Progress Note/Recertification   Patient will continue to benefit from skilled PT services to modify and progress therapeutic interventions, address functional mobility deficits, address ROM deficits, address strength deficits, analyze and address soft tissue restrictions, analyze and cue movement patterns, analyze and modify body mechanics/ergonomics, assess and modify postural abnormalities, and instruct in home and community integration to attain remaining goals.    Progress toward goals / Updated goals:    Progressing towards STG 2 & 4     PLAN  []  Upgrade activities as tolerated YES Continue plan of care   []  Discharge due to :    []  Other:      Therapist: Norma Valentino PT    Date: 2/2/2023 Time: 5:20 PM     Future Appointments   Date Time Provider Michael Bailon   2/7/2023  6:00 PM Rema Jackson [facility] [place] [facility]   2/9/2023  4:30 PM Anisa Benjamin, PT [facility]   2/14/2023  4:00 PM [facility] PT [facility]   2/16/2023  4:30 PM Anisa Benjamin, PT [facility] [place] [facility]   2/21/2023  4:30 PM Anisa Benjamin, PT [facility]   2/23/2023  4:30 PM [facility] PT [facility]   2/28/2023  4:30 PM [facility] PT [facility]

## 2023-02-07 ENCOUNTER — HOSPITAL ENCOUNTER (OUTPATIENT)
Dept: PHYSICAL THERAPY | Age: 58
Discharge: HOME | End: 2023-02-07
Payer: COMMERCIAL

## 2023-02-07 PROCEDURE — 97140 MANUAL THERAPY 1/> REGIONS: CPT

## 2023-02-07 PROCEDURE — 97110 THERAPEUTIC EXERCISES: CPT

## 2023-02-07 NOTE — PROGRESS NOTES
PHYSICAL THERAPY - DAILY TREATMENT NOTE    Patient Name: David Gibbons        Date: 2023  : 1965   YES Patient  Verified  Visit #:     Insurance: Payor: Anatoliy Foley / Plan: [id number] / Product Type: PPO /      In time: 600 Out time: 630   Total Treatment Time: 30     BCBS/Medicare Time Tracking (below)   Total Timed Codes (min): 30 1:1 Treatment Time:  30     TREATMENT AREA =  Other low back pain [M54.59]    SUBJECTIVE  Pain Level (on 0 to 10 scale): 2 / 10   Medication Changes/New allergies or changes in medical history, any new surgeries or procedures? NO    If yes, update Summary List   Subjective Functional Status/Changes:  []  No changes reported     No real change in symptoms at this time.          Modalities Rationale:     Patient deferred   min [] Estim, type/location:                                      []  att     []  unatt     []  w/US     []  w/ice    []  w/heat    min []  Mechanical Traction: type/lbs                   []  pro   []  sup   []  int   []  cont    []  before manual    []  after manual    min []  Ultrasound, settings/location:      min []  Iontophoresis w/ dexamethasone, location:                                               []  take home patch       []  in clinic    min []  Ice     []  Heat    location/position:     min []  Vasopneumatic Device, press/temp:    If using vaso (only need to measure limb vaso being performed on)      pre-treatment girth :       post-treatment girth :       measured at (landmark location) :      min []  Other:    [] Skin assessment post-treatment (if applicable):    []  intact    []  redness- no adverse reaction                  []redness - adverse reaction:         min Therapeutic Exercise:  [x]  See flow sheet   Rationale:      increase ROM and increase strength to improve the patients ability to return to pain-free work activities while sitting at his work station     15 min Manual Therapy:  Prone with one pillow: DTM (R) QL > (L) and L/S paraspinals (R) > (L). Distraction. Rationale:      increase ROM and increase strength to improve the patients ability to return to pain-free work activities while sitting at his work station     15 min Neuromuscular Re-ed: [x]  See flow sheet   Rationale:    increase strength, improve coordination, and increase proprioception to improve the patients ability to return to unsupported sitting with improved postural awareness     min Therapeutic Activity: [x]  See flow sheet   Rationale:    increase ROM and increase strength to improve the patients ability to tolerate return to light lifting at home without increase in pain      Billed With/As:   [x] TE   [] TA   [] Neuro   [] Self Care Patient Education: [x] Review HEP    [] Progressed/Changed HEP based on:   [] positioning   [] body mechanics   [] transfers   [] heat/ice application    [] other:      Other Objective/Functional Measures:    Decrease pain sensation in (R)  lower leg after manual and lying prone with one pillow. Noted increase muscle tension along (R) QL and paraspinals of L/S. Post Treatment Pain Level (on 0 to 10) scale:   3  / 10     ASSESSMENT  Assessment/Changes in Function:     Patient to have MRI tomorrow on Wednesday. Patient advised to bring in copy of results. NB/NW after treatment session     []  See Progress Note/Recertification   Patient will continue to benefit from skilled PT services to modify and progress therapeutic interventions, address functional mobility deficits, address ROM deficits, address strength deficits, analyze and address soft tissue restrictions, analyze and cue movement patterns, analyze and modify body mechanics/ergonomics, assess and modify postural abnormalities, and instruct in home and community integration to attain remaining goals. Progress toward goals / Updated goals:    Short Term Goals:  To be accomplished in  2  weeks:  1) Establish HEP to prevent further disability. 2) Patient will report decreased c/o average pain to < or = 2-3/10 to facilitate prolonged sitting at desk with manageable sx in lower back. 3) Improve FOTO score from 42 points to > or = 50 points indicating improved tolerance with ADLs in regards to lower back. 4) Centralize R LE sx to level of posterior thigh to facilitate periods of prolonged driving without peripheralization of sx. Long Term Goals: To be accomplished in  4  weeks:  1) Improve FOTO score from 50 points to > or = 61 points indicating improved tolerance with ADLs in regards to lower back. 2) Patient will demonstrate (-) neural tension with SLR & prone knee flexion on R LE to facilitate driving activities with manageable sx. 3) Centralize R LE sx to level of l/s to facilitate return to pain-free work activities without peripheralization of sx. 4) Patient to report no c/o weakness in R LE when negotiating stairs or curbs.      PLAN  []  Upgrade activities as tolerated YES Continue plan of care   []  Discharge due to :    []  Other:      Therapist: Fito Diaz PTA    Date: 2/7/2023 Time: 5:20 PM     Future Appointments   Date Time Provider Michael Bailon   2/7/2023  6:00 PM Eve Garcia [facility]   2/9/2023  4:30 PM Enrikea Giovanni, PT [facility]   2/14/2023  4:00 PM [facility] PT [facility]   2/16/2023  4:30 PM Debbora Lines, PT [facility]   2/21/2023  4:30 PM Debbora Giovanni, PT [facility]   2/23/2023  4:30 PM [facility] PT [facility]   2/28/2023  4:30 PM [facility] PT [facility]

## 2023-02-09 ENCOUNTER — HOSPITAL ENCOUNTER (OUTPATIENT)
Dept: PHYSICAL THERAPY | Age: 58
Discharge: HOME | End: 2023-02-09
Payer: COMMERCIAL

## 2023-02-09 PROCEDURE — 97014 ELECTRIC STIMULATION THERAPY: CPT

## 2023-02-09 PROCEDURE — 97140 MANUAL THERAPY 1/> REGIONS: CPT

## 2023-02-09 PROCEDURE — 97535 SELF CARE MNGMENT TRAINING: CPT

## 2023-02-09 NOTE — PROGRESS NOTES
PHYSICAL THERAPY - DAILY TREATMENT NOTE    Patient Name: Nydia Quispe        Date: 2023  : 1965   yes Patient  Verified  Visit #:      of   12  Insurance: Payor: BLUE CROSS / Plan: [address] / Product Type: PPO /      In time: 455 Out time: 540   Total Treatment Time: 45     Medicare/[employer] Time Tracking (below)   Total Timed Codes (min):  35 1:1 Treatment Time:  35     TREATMENT AREA =  Other low back pain [M54.59]    SUBJECTIVE  Pain Level (on 0 to 10 scale):  2  / 10   Medication Changes/New allergies or changes in medical history, any new surgeries or procedures?    no  If yes, update Summary List   Subjective Functional Status/Changes:  []  No changes reported     Patient reports no pain down the leg today. But the pain is off and on. OBJECTIVE  Modalities Rationale:     decrease edema, decrease inflammation, and decrease pain to improve patient's ability to perform ADLs with decreased pain and improved mobility.   10 min [x] Estim, type/location: Prone over 3 pillows IFC to low back                                     []  att     [x]  unatt     []  w/US     []  w/ice    []  w/heat    min []  Mechanical Traction: type/lbs                   []  pro   []  sup   []  int   []  cont    []  before manual    []  after manual    min []  Ultrasound, settings/location:      min []  Iontophoresis w/ dexamethasone, location:                                               []  take home patch       []  in clinic    min []  Ice     []  Heat    location/position:     min []  Vasopneumatic Device, press/temp:    If using vaso (only need to measure limb vaso being performed on)      pre-treatment girth :       post-treatment girth :       measured at (landmark location) :      min []  Other:    [] Skin assessment post-treatment (if applicable):    []  intact    []  redness- no adverse reaction                  []redness - adverse reaction:        25 min Manual Therapy: Prone with three pillow: DTM (R) QL > (L) and L/S paraspinals (R) > (L). Distraction. to sacrum (which helped a lot in prone over 3 pillows with reduction in symptoms)    Rationale:      decrease pain, increase ROM, increase tissue extensibility, decrease edema , and decrease trigger points to improve patient's ability to perform general ADLs with decrease c/o symptoms and with improved functional performance. The manual therapy interventions were performed at a separate and distinct time from the therapeutic activities interventions. 10 min Self Care: Educated on prone pillow progression for reduction of radicular sytmpoms    Rationale:    increase ROM, increase strength, improve coordination, improve balance, and increase proprioception to improve the patients ability to perform ADLs with decreased c/o symptoms and improved mobility. Billed With/As:   [x] TE   [] TA   [] Neuro   [] Self Care Patient Education: [x] Review HEP    [] Progressed/Changed HEP based on:   [] positioning   [] body mechanics   [] transfers   [] heat/ice application    [] other:      Other Objective/Functional Measures:    Prone over 3 pillows with sacral distraction - reduction in neural symptoms down R LE    Post Treatment Pain Level (on 0 to 10) scale:   2 / 10     ASSESSMENT  Assessment/Changes in Function:     Patient tolerated today's treatment very well. Did well with prone over 3 pillow. s tried MFR in prone over 2 pillows but increased radic symptoms. Tried distraction in over 3 pillows and that reduced his R radic symptoms. ended with IFC and ice.       []  See Progress Note/Recertification   Patient will continue to benefit from skilled PT services to modify and progress therapeutic interventions, address functional mobility deficits, address ROM deficits, address strength deficits, analyze and address soft tissue restrictions, analyze and cue movement patterns, analyze and modify body mechanics/ergonomics, assess and modify postural abnormalities, and instruct in home and community integration to attain remaining goals. Progress toward goals / Updated goals:    Short Term Goals: To be accomplished in  2  weeks:  1) Establish HEP to prevent further disability. 2) Patient will report decreased c/o average pain to < or = 2-3/10 to facilitate prolonged sitting at desk with manageable sx in lower back. 3) Improve FOTO score from 42 points to > or = 50 points indicating improved tolerance with ADLs in regards to lower back. 4) Centralize R LE sx to level of posterior thigh to facilitate periods of prolonged driving without peripheralization of sx. Long Term Goals: To be accomplished in  4  weeks:  1) Improve FOTO score from 50 points to > or = 61 points indicating improved tolerance with ADLs in regards to lower back. 2) Patient will demonstrate (-) neural tension with SLR & prone knee flexion on R LE to facilitate driving activities with manageable sx. 3) Centralize R LE sx to level of l/s to facilitate return to pain-free work activities without peripheralization of sx.     4) Patient to report no c/o weakness in R LE when negotiating stairs or curbs     PLAN  [x]  Upgrade activities as tolerated yes Continue plan of care   []  Discharge due to :    []  Other:      Therapist: Familia Newman PT    Date: 2/9/2023 Time: 12:47 PM     Future Appointments   Date Time Provider Michael Bailon   2/9/2023  4:30 PM Ulysses Maloney PT [facility]   2/14/2023  4:00 PM [facility] PT [facility]   2/16/2023  4:30 PM Ulysses Maloney PT [facility]   2/21/2023  4:30 PM Ulysses aMloney PT [facility]   2/23/2023  4:30 PM [facility] PT [facility]   2/28/2023  4:30 PM [facility] PT [facility] 1 [facility]

## 2023-02-10 ENCOUNTER — TELEPHONE (OUTPATIENT)
Dept: FAMILY MEDICINE CLINIC | Facility: CLINIC | Age: 58
End: 2023-02-10

## 2023-02-10 NOTE — TELEPHONE ENCOUNTER
Please advise Mr. Ramona Scott that the lumbar MRI scan shows areas at L4-5 and L5-S1 that could be the source of pain on his right side that radiates down the right leg. This does not look like this sort of thing that would benefit from surgical intervention but might very well benefit from an epidural steroid injection. From what he described before I do not think he is ever really received 1. Please ask if he would like to be referred to a specialist who can perform this procedure.

## 2023-02-13 DIAGNOSIS — M54.16 RADICULOPATHY, LUMBAR REGION: Primary | ICD-10-CM

## 2023-02-14 ENCOUNTER — HOSPITAL ENCOUNTER (OUTPATIENT)
Facility: HOSPITAL | Age: 58
Setting detail: RECURRING SERIES
Discharge: HOME | End: 2023-02-17
Payer: COMMERCIAL

## 2023-02-14 ENCOUNTER — APPOINTMENT (OUTPATIENT)
Dept: PHYSICAL THERAPY | Age: 58
End: 2023-02-14
Payer: COMMERCIAL

## 2023-02-14 PROCEDURE — G0283 ELEC STIM OTHER THAN WOUND: HCPCS

## 2023-02-14 PROCEDURE — 97110 THERAPEUTIC EXERCISES: CPT

## 2023-02-14 PROCEDURE — 97140 MANUAL THERAPY 1/> REGIONS: CPT

## 2023-02-14 NOTE — PROGRESS NOTES
PHYSICAL / OCCUPATIONAL THERAPY - DAILY TREATMENT NOTE (updated )    Patient Name: Antwan Silva    Date: 2023    : 1965  Insurance: Payor: Catracho Irby / Plan: Emmanuel Tadeo / Product Type: *No Product type* /      Patient  verified Yes     Visit #   Current / Total 6 12   Time   In / Out 400 440   Pain   In / Out 1 0   Subjective Functional Status/Changes: Reports no significant problems over this past weekend. Denies radicular symptoms in (R) LE today. Changes to:  Meds, Allergies, Med Hx, Sx Hx? If yes, update Summary List no       TREATMENT AREA =  No admission diagnoses are documented for this encounter. OBJECTIVE    Modalities Rationale:     decrease inflammation, decrease pain, and increase tissue extensibility to improve patient's ability to progress to PLOF and address remaining functional goals. 10 min [x] Estim Unattended, type/location:  IFC L/S and glutes                                    [x]  w/ice    []  w/heat    min [] Estim Attended, type/location:                                     []  w/US     []  w/ice    []  w/heat    []  TENS insruct      min []  Mechanical Traction: type/lbs                   []  pro   []  sup   []  int   []  cont    []  before manual    []  after manual    min []  Ultrasound, settings/location:      min []  Iontophoresis w/ dexamethasone, location:                                               []  take home patch       []  in clinic    min  unbilled []  Ice     []  Heat    location/position:     min []  Paraffin,  details:     min []  Vasopneumatic Device, press/temp:     min []  Ardith Form / Italai Lesch:     If using vaso (only need to measure limb vaso being performed on)      pre-treatment girth :       post-treatment girth :       measured at (landmark location) :      min []  Other:    Skin assessment post-treatment (if applicable):    []  intact    []  redness- no adverse reaction                 []redness - adverse reaction: Therapeutic Procedures: Tx Min Billable or 1:1 Min (if diff from Tx Min) Procedure, Rationale, Specifics   20  [id number] Manual Therapy (timed):  decrease pain, increase ROM, and increase tissue extensibility to improve patient's ability to progress to PLOF and address remaining functional goals. The manual therapy interventions were performed at a separate and distinct time from the therapeutic activities interventions . (see flow sheet as applicable)     Details if applicable:  DTM L/S paraspinals and (B) upper glutes, PA mobs L4/5 region. 10  [id number] Neuromuscular Re-Education (timed):  improve balance, coordination, kinesthetic sense, posture, core stability and proprioception to improve patient's ability to develop conscious control of individual muscles and awareness of position of extremities in order to progress to PLOF and address remaining functional goals. (see flow sheet as applicable)     Details if applicable:            Details if applicable:            Details if applicable:            Details if applicable:       [facility] BC Totals Reminder: bill using total billable min of TIMED therapeutic procedures (example: do not include dry needle or estim unattended, both untimed codes, in totals to left)  8-22 min = 1 unit; 23-37 min = 2 units; 38-52 min = 3 units; 53-67 min = 4 units; 68-82 min = 5 units   Total Total     [x]  Patient Education billed concurrently with other procedures   [x] Review HEP    [] Progressed/Changed HEP, detail:    [] Other detail:       Objective Information/Functional Measures/Assessment    Good centralization of symptoms with use of HEP and therx in gym. Able to abolish LBP with prone lying with one pillow. No LBP or radicular symptoms at end of treatment session.     Patient will continue to benefit from skilled PT / OT services to modify and progress therapeutic interventions, analyze and address functional mobility deficits, analyze and address ROM deficits, analyze and address strength deficits, analyze and address soft tissue restrictions, and analyze and cue for proper movement patterns to address functional deficits and attain remaining goals. Progress toward goals / Updated goals:  []  See Progress Note/Recertification    Short Term Goals: To be accomplished in  2  weeks:  1) Establish HEP to prevent further disability. Progressing well 2/14/23  2) Patient will report decreased c/o average pain to < or = 2-3/10 to facilitate prolonged sitting at desk with manageable sx in lower back. Progressing steadily 2/14/23  3) Improve FOTO score from 42 points to > or = 50 points indicating improved tolerance with ADLs in regards to lower back. 4) Centralize R LE sx to level of posterior thigh to facilitate periods of prolonged driving without peripheralization of sx. Progressing steadily 2/14/23. Long Term Goals: To be accomplished in  4  weeks:  1) Improve FOTO score from 50 points to > or = 61 points indicating improved tolerance with ADLs in regards to lower back. 2) Patient will demonstrate (-) neural tension with SLR & prone knee flexion on R LE to facilitate driving activities with manageable sx. 3) Centralize R LE sx to level of l/s to facilitate return to pain-free work activities without peripheralization of sx. 4) Patient to report no c/o weakness in R LE when negotiating stairs or curbs.         PLAN  Yes  Continue plan of care  [x]  Upgrade activities as tolerated  []  Discharge due to :  []  Other:    Catherine Mayen PTA    2/14/2023    11:15 AM    Future Appointments   Date Time Provider Reg Apple   2/14/2023  4:00 PM Catherine Mayen PTA [facility]   2/16/2023  4:30 PM Chelsey Monique PT [facility]   2/21/2023  4:30 PM Chelsey Monique PT [facility]   2/23/2023  4:30 PM Catherine Mayen PTA [facility]   2/28/2023  4:30 PM Catherine Mayen PTA [facility]

## 2023-02-16 ENCOUNTER — APPOINTMENT (OUTPATIENT)
Dept: PHYSICAL THERAPY | Age: 58
End: 2023-02-16
Payer: COMMERCIAL

## 2023-02-21 ENCOUNTER — HOSPITAL ENCOUNTER (OUTPATIENT)
Facility: HOSPITAL | Age: 58
Setting detail: RECURRING SERIES
Discharge: HOME | End: 2023-02-24
Payer: COMMERCIAL

## 2023-02-21 ENCOUNTER — APPOINTMENT (OUTPATIENT)
Dept: PHYSICAL THERAPY | Age: 58
End: 2023-02-21
Payer: COMMERCIAL

## 2023-02-21 PROCEDURE — 97112 NEUROMUSCULAR REEDUCATION: CPT

## 2023-02-21 PROCEDURE — 97140 MANUAL THERAPY 1/> REGIONS: CPT

## 2023-02-21 NOTE — PROGRESS NOTES
PHYSICAL / OCCUPATIONAL THERAPY - DAILY TREATMENT NOTE (updated )    Patient Name: Jose J Gillespie    Date: 2023    : 1965  Insurance: Payor: MALA / Plan: LAUREN MEDEIROS FEDERAL / Product Type: *No Product type* /      Patient  verified Yes     Visit #   Current / Total 7 12   Time   In / Out 445 510   Pain   In / Out 1/10 0/10   Subjective Functional Status/Changes: Reports the back is doing all right. Has an INDIRA scheduled for Friday. Saw the pain management recently    Changes to:  Meds, Allergies, Med Hx, Sx Hx?  If yes, update Summary List no       TREATMENT AREA =  No admission diagnoses are documented for this encounter.    OBJECTIVE    Modalities Rationale:     decrease inflammation, decrease pain, and increase tissue extensibility to improve patient's ability to progress to PLOF and address remaining functional goals.    PD min [x] Estim Unattended, type/location:  IFC L/S and glutes                                    [x]  w/ice    []  w/heat    min [] Estim Attended, type/location:                                     []  w/US     []  w/ice    []  w/heat    []  TENS insruct      min []  Mechanical Traction: type/lbs                   []  pro   []  sup   []  int   []  cont    []  before manual    []  after manual    min []  Ultrasound, settings/location:      min []  Iontophoresis w/ dexamethasone, location:                                               []  take home patch       []  in clinic    min  unbilled []  Ice     []  Heat    location/position:     min []  Paraffin,  details:     min []  Vasopneumatic Device, press/temp:     min []  Whirlpool / Fluido:    If using vaso (only need to measure limb vaso being performed on)      pre-treatment girth :       post-treatment girth :       measured at (landmark location) :      min []  Other:    Skin assessment post-treatment (if applicable):    []  intact    []  redness- no adverse reaction                 []redness - adverse reaction:      Therapeutic Procedures: Tx Min Billable or 1:1 Min (if diff from Tx Min) Procedure, Rationale, Specifics   15  28455 Manual Therapy (timed):  decrease pain, increase ROM, and increase tissue extensibility to improve patient's ability to progress to PLOF and address remaining functional goals. The manual therapy interventions were performed at a separate and distinct time from the therapeutic activities interventions . (see flow sheet as applicable)     Details if applicable:  DTM L/S paraspinals and (B) upper glutes, PA mobs Ll-3 gr 4   Sacral distraction     10  79961 Neuromuscular Re-Education (timed):  improve balance, coordination, kinesthetic sense, posture, core stability and proprioception to improve patient's ability to develop conscious control of individual muscles and awareness of position of extremities in order to progress to PLOF and address remaining functional goals. (see flow sheet as applicable)     Details if applicable:            Details if applicable:            Details if applicable:            Details if applicable:     22  [facility] Totals Reminder: bill using total billable min of TIMED therapeutic procedures (example: do not include dry needle or estim unattended, both untimed codes, in totals to left)  8-22 min = 1 unit; 23-37 min = 2 units; 38-52 min = 3 units; 53-67 min = 4 units; 68-82 min = 5 units   Total Total     [x]  Patient Education billed concurrently with other procedures   [x] Review HEP    [] Progressed/Changed HEP, detail:    [] Other detail:       Objective Information/Functional Measures/Assessment      Better positional tolerance over 2 pillows. More pain going into extension over 2 pillows. Worse with prone on hands compared to prone on elbows.  Advised on directional preference today to avoid neurogenic symptoms     Session limited to pt being late    Patient will continue to benefit from skilled PT / OT services to modify and progress therapeutic interventions, analyze and address functional mobility deficits, analyze and address ROM deficits, analyze and address strength deficits, analyze and address soft tissue restrictions, and analyze and cue for proper movement patterns to address functional deficits and attain remaining goals. Progress toward goals / Updated goals:  []  See Progress Note/Recertification    Short Term Goals: To be accomplished in  2  weeks:  1) Establish HEP to prevent further disability. Progressing well 2/14/23  2) Patient will report decreased c/o average pain to < or = 2-3/10 to facilitate prolonged sitting at desk with manageable sx in lower back. Progressing steadily 2/14/23  3) Improve FOTO score from 42 points to > or = 50 points indicating improved tolerance with ADLs in regards to lower back. 4) Centralize R LE sx to level of posterior thigh to facilitate periods of prolonged driving without peripheralization of sx. Progressing steadily 2/21/23. Long Term Goals: To be accomplished in  4  weeks:  1) Improve FOTO score from 50 points to > or = 61 points indicating improved tolerance with ADLs in regards to lower back. 2) Patient will demonstrate (-) neural tension with SLR & prone knee flexion on R LE to facilitate driving activities with manageable sx. 3) Centralize R LE sx to level of l/s to facilitate return to pain-free work activities without peripheralization of sx. 4) Patient to report no c/o weakness in R LE when negotiating stairs or curbs.         PLAN  Yes  Continue plan of care  [x]  Upgrade activities as tolerated  []  Discharge due to :  []  Other:    Alia Mejia PT    2/21/2023    10:28 AM    Future Appointments   Date Time Provider Reg Apple   2/21/2023  4:30 PM Alia Mejia PT [facility]   2/23/2023  4:30 PM Shivani Egan [facility]   2/28/2023  4:30 PM Almaz Ingram PTA [facility]

## 2023-02-23 ENCOUNTER — HOSPITAL ENCOUNTER (OUTPATIENT)
Facility: HOSPITAL | Age: 58
Setting detail: RECURRING SERIES
Discharge: HOME | End: 2023-02-26
Payer: COMMERCIAL

## 2023-02-23 ENCOUNTER — APPOINTMENT (OUTPATIENT)
Dept: PHYSICAL THERAPY | Age: 58
End: 2023-02-23
Payer: COMMERCIAL

## 2023-02-23 PROCEDURE — 97140 MANUAL THERAPY 1/> REGIONS: CPT

## 2023-02-23 PROCEDURE — G0283 ELEC STIM OTHER THAN WOUND: HCPCS

## 2023-02-23 NOTE — PROGRESS NOTES
PHYSICAL / OCCUPATIONAL THERAPY - DAILY TREATMENT NOTE (updated )    Patient Name: Aracelis Roblero    Date: 2023    : 1965  Insurance: Payor: Dona Healy / Plan: Ramon Hooper / Product Type: *No Product type* /      Patient  verified Yes     Visit #   Current / Total 8 12   Time   In / Out 435 500   Pain   In / Out 3 3   Subjective Functional Status/Changes: I woke up in an awkward position and I think that's why my pain is higher. Having some radicular symptoms but not like before. Tomorrow I go for my epidural.   Changes to:  Meds, Allergies, Med Hx, Sx Hx? If yes, update Summary List no       TREATMENT AREA =  No admission diagnoses are documented for this encounter. OBJECTIVE    Modalities Rationale:     decrease inflammation, decrease pain, and increase tissue extensibility to improve patient's ability to progress to PLOF and address remaining functional goals. 10 min [x] Estim Unattended, type/location:  IFC L/S and glutes                                    [x]  w/ice    []  w/heat    min [] Estim Attended, type/location:                                     []  w/US     []  w/ice    []  w/heat    []  TENS insruct      min []  Mechanical Traction: type/lbs                   []  pro   []  sup   []  int   []  cont    []  before manual    []  after manual    min []  Ultrasound, settings/location:      min []  Iontophoresis w/ dexamethasone, location:                                               []  take home patch       []  in clinic    min  unbilled []  Ice     []  Heat    location/position:     min []  Paraffin,  details:     min []  Vasopneumatic Device, press/temp:     min []  Michael Toro / Domenica Chung:     If using vaso (only need to measure limb vaso being performed on)      pre-treatment girth :       post-treatment girth :       measured at (landmark location) :      min []  Other:    Skin assessment post-treatment (if applicable):    []  intact    []  redness- no adverse reaction               []redness - adverse reaction:         Therapeutic Procedures:  Tx Min Billable or 1:1 Min (if diff from Tx Min) Procedure, Rationale, Specifics   15  76049 Manual Therapy (timed):  decrease pain, increase ROM, and increase tissue extensibility to improve patient's ability to progress to PLOF and address remaining functional goals.  The manual therapy interventions were performed at a separate and distinct time from the therapeutic activities interventions . (see flow sheet as applicable)     Details if applicable:  DTM L/S paraspinals and (B) upper glutes, PA mobs L4/5 region.       89415 Neuromuscular Re-Education (timed):  improve balance, coordination, kinesthetic sense, posture, core stability and proprioception to improve patient's ability to develop conscious control of individual muscles and awareness of position of extremities in order to progress to PLOF and address remaining functional goals. (see flow sheet as applicable)     Details if applicable:            Details if applicable:            Details if applicable:            Details if applicable:       [facility] Totals Reminder: bill using total billable min of TIMED therapeutic procedures (example: do not include dry needle or estim unattended, both untimed codes, in totals to left)  8-22 min = 1 unit; 23-37 min = 2 units; 38-52 min = 3 units; 53-67 min = 4 units; 68-82 min = 5 units   Total Total     [x]  Patient Education billed concurrently with other procedures   [x] Review HEP    [] Progressed/Changed HEP, detail:    [] Other detail:       Objective Information/Functional Measures/Assessment    No change of symptoms at end of treatment.  Patient having INDIRA performed on 2/24/23.  Light on therapy today.    Patient will continue to benefit from skilled PT / OT services to modify and progress therapeutic interventions, analyze and address functional mobility deficits, analyze and address ROM deficits, analyze and address strength deficits,  analyze and address soft tissue restrictions, and analyze and cue for proper movement patterns to address functional deficits and attain remaining goals. Progress toward goals / Updated goals:  []  See Progress Note/Recertification    Short Term Goals: To be accomplished in  2  weeks:  1) Establish HEP to prevent further disability. Progressing well 2/23/23  2) Patient will report decreased c/o average pain to < or = 2-3/10 to facilitate prolonged sitting at desk with manageable sx in lower back. Progressing steadily 2/23/23  3) Improve FOTO score from 42 points to > or = 50 points indicating improved tolerance with ADLs in regards to lower back. 4) Centralize R LE sx to level of posterior thigh to facilitate periods of prolonged driving without peripheralization of sx. Progressing steadily 2/23/23. Long Term Goals: To be accomplished in  4  weeks:  1) Improve FOTO score from 50 points to > or = 61 points indicating improved tolerance with ADLs in regards to lower back. 2) Patient will demonstrate (-) neural tension with SLR & prone knee flexion on R LE to facilitate driving activities with manageable sx. 3) Centralize R LE sx to level of l/s to facilitate return to pain-free work activities without peripheralization of sx. 4) Patient to report no c/o weakness in R LE when negotiating stairs or curbs.         PLAN  Yes  Continue plan of care  [x]  Upgrade activities as tolerated  []  Discharge due to :  []  Other:    Braeden Barros PTA    2/23/2023    8:56 AM    Future Appointments   Date Time Provider Reg Apple   2/23/2023  4:30 PM Archana Sanchez [facility]   2/28/2023  4:30 PM Braeden Barros PTA [facility]

## 2023-02-28 ENCOUNTER — HOSPITAL ENCOUNTER (OUTPATIENT)
Facility: HOSPITAL | Age: 58
Setting detail: RECURRING SERIES
Discharge: HOME | End: 2023-03-03
Payer: COMMERCIAL

## 2023-02-28 ENCOUNTER — APPOINTMENT (OUTPATIENT)
Dept: PHYSICAL THERAPY | Age: 58
End: 2023-02-28
Payer: COMMERCIAL

## 2023-02-28 PROCEDURE — 97535 SELF CARE MNGMENT TRAINING: CPT

## 2023-02-28 PROCEDURE — 97112 NEUROMUSCULAR REEDUCATION: CPT

## 2023-02-28 NOTE — THERAPY RECERTIFICATION
[address] [facility] PHYSICAL THERAPY  [address] [facility]. [facility] [address], [facility], [facility] [facility] Ph: [phone] Fx: [phone]  PHYSICAL THERAPY PROGRESS NOTE  Patient Name: Edson Anguiano : 1965   Treatment/Medical Diagnosis: Other low back pain   Referral Source: Abhay Alvarez MD     Date of Initial Visit: 2023 Attended Visits: 8 Missed Visits: 0     SUMMARY OF TREATMENT  SUMMARY OF TREATMENT  Pt seen in clinic for 8 visits to address low back pain symptoms. Pt has been assessed, completed therapeutic exercises, neuromuscular re-ed, therapeutic functional activity, received manual therapy intervention, self-care strategies, HEP techniques and pt ed on condition consistency and follow-through. CURRENT STATUS     GROC: +4 moderately better   Patient reports max pain 3/10, least pain 0/10, average pain 1/10. Patient reports ~ 90% overall improvement with performance of all general ADLs. Patient reports ~ 35% reduction of symptoms. Patient reports sitting tolerance:no problems, standing tolerance: 60 minutes, walking tolerance: 60 minutes. Patient will reports ~ 50% improvement with his ability to sleep through the night uninterrupted without L/S. Improve FOTO score from 50 points to > or = 61 points indicating improved tolerance with ADLs in regards to lower back. Status at last Eval: 42  Current Status: 60  Goal Met? Progressing well    2. Patient will demonstrate (-) neural tension with SLR & prone knee flexion on R LE to facilitate driving activities with manageable sx. Status at last Eval: SLR was mildly (+) on R for lower pain & prone knee flexion test was (+) on R for both LBP & R LE pain  Current Status: reports decrease tension and pain when tested   Goal Met?  progressing    3. Centralize R LE sx to level of l/s to facilitate return to pain-free work activities without peripheralization of sx.     Status at last Eval: GIOVANNI positioning increased LBP, unable to tolerate REIL. RFIL had no affect on sx today. Current Status: continues with intermittent symptoms. Goal Met?   progressing    4. Patient to report no c/o weakness in R LE when negotiating stairs or curbs. Status at last Eval: unable  Current Status: negotiates 4 steps without noted weakness  Goal Met?  yes      New Goals to be achieved in __4__ weeks  1. Improve FOTO score from 50 points to > or = 61 points indicating improved tolerance with ADLs in regards to lower back. 2. Centralize R LE sx to level of l/s to facilitate return to pain-free work activities without peripheralization of sx. 3.  Patient will demonstrate (-) neural tension with SLR & prone knee flexion on R LE to facilitate driving activities with manageable sx. 4. Patient to report no c/o weakness in R LE when negotiating 1 flight on [employer]    RECOMMENDATIONS  We would like to continue 2x/wk for 4 weeks to progress patient further with all functional mobility and general activities. Please advise. If you have any questions/comments please contact us directly at [phone]. Thank you for allowing us to assist in the care of your patient.     Lindacarolann Garcia, PTA       2/28/2023       6:50 PM

## 2023-02-28 NOTE — PROGRESS NOTES
PHYSICAL / OCCUPATIONAL THERAPY - DAILY TREATMENT NOTE (updated )    Patient Name: Pb Key    Date: 2023    : 1965  Insurance: Payor: Rell Kim 150 / Plan: Maribell Redmond / Product Type: *No Product type* /      Patient  verified Yes     Visit #   Current / Total 9 12   Time   In / Out 435 505   Pain   In / Out 1 0   Subjective Functional Status/Changes: Woke up at a 1-2 and still feeling pretty good. Changes to:  Meds, Allergies, Med Hx, Sx Hx? If yes, update Summary List no       TREATMENT AREA =  No admission diagnoses are documented for this encounter. OBJECTIVE    Modalities Rationale:     decrease inflammation, decrease pain, and increase tissue extensibility to improve patient's ability to progress to PLOF and address remaining functional goals. min [x] Estim Unattended, type/location:  IFC L/S and glutes                                    [x]  w/ice    []  w/heat    min [] Estim Attended, type/location:                                     []  w/US     []  w/ice    []  w/heat    []  TENS insruct      min []  Mechanical Traction: type/lbs                   []  pro   []  sup   []  int   []  cont    []  before manual    []  after manual    min []  Ultrasound, settings/location:      min []  Iontophoresis w/ dexamethasone, location:                                               []  take home patch       []  in clinic    min  unbilled []  Ice     []  Heat    location/position:     min []  Paraffin,  details:     min []  Vasopneumatic Device, press/temp:     min []  Hollace Band / Lei Douse: If using vaso (only need to measure limb vaso being performed on)      pre-treatment girth :       post-treatment girth :       measured at (landmark location) :      min []  Other:    Skin assessment post-treatment (if applicable):    []  intact    []  redness- no adverse reaction                 []redness - adverse reaction:         Therapeutic Procedures:   Tx Min Billable or 1:1 Min (if diff from Tx Min) Procedure, Rationale, Specifics     99991 Manual Therapy (timed):  decrease pain, increase ROM, and increase tissue extensibility to improve patient's ability to progress to PLOF and address remaining functional goals. The manual therapy interventions were performed at a separate and distinct time from the therapeutic activities interventions . (see flow sheet as applicable)     Details if applicable:  DTM L/S paraspinals and (B) upper glutes, PA mobs L4/5 region. 15  [id number] Neuromuscular Re-Education (timed):  improve balance, coordination, kinesthetic sense, posture, core stability and proprioception to improve patient's ability to develop conscious control of individual muscles and awareness of position of extremities in order to progress to PLOF and address remaining functional goals. (see flow sheet as applicable)     Details if applicable:     15   14011 Self Care/Home Management (timed):  improve patient knowledge and understanding of pain reducing techniques, diagnosis/prognosis, physical therapy expectations, procedures and progression, and assess goals and functional mobility progrees  to improve patient's ability to progress to PLOF and address remaining functional goals. (see flow sheet as applicable)       Details if applicable:            Details if applicable:            Details if applicable:     27  [facility] Totals Reminder: bill using total billable min of TIMED therapeutic procedures (example: do not include dry needle or estim unattended, both untimed codes, in totals to left)  8-22 min = 1 unit; 23-37 min = 2 units; 38-52 min = 3 units; 53-67 min = 4 units; 68-82 min = 5 units   Total Total     [x]  Patient Education billed concurrently with other procedures   [x] Review HEP    [] Progressed/Changed HEP, detail:    [] Other detail:       Objective Information/Functional Measures/Assessment    FOTO: 60     GROC: +4 moderately better.      Patient reports max pain 3/10, least pain 0/10, average pain 1/10. Patient reports ~ 90% overall improvement with performance of all general ADLs. Patient reports ~ 35% reduction of symptoms. Patient reports sitting tolerance:no problems, standing tolerance: 60 minutes, walking tolerance: 60 minutes. Patient will reports ~ 50% improvement with his ability to sleep through the night uninterrupted without L/S/    Patient will continue to benefit from skilled PT / OT services to modify and progress therapeutic interventions, analyze and address functional mobility deficits, analyze and address ROM deficits, analyze and address strength deficits, analyze and address soft tissue restrictions, and analyze and cue for proper movement patterns to address functional deficits and attain remaining goals. Progress toward goals / Updated goals:  []  See Progress Note/Recertification    Short Term Goals: To be accomplished in  2  weeks:  1) Establish HEP to prevent further disability. Progressing well 2/23/23  2) Patient will report decreased c/o average pain to < or = 2-3/10 to facilitate prolonged sitting at desk with manageable sx in lower back. Progressing steadily 2/23/23  3) Improve FOTO score from 42 points to > or = 50 points indicating improved tolerance with ADLs in regards to lower back. 4) Centralize R LE sx to level of posterior thigh to facilitate periods of prolonged driving without peripheralization of sx. Progressing steadily 2/23/23. Long Term Goals: To be accomplished in  4  weeks:  1) Improve FOTO score from 50 points to > or = 61 points indicating improved tolerance with ADLs in regards to lower back. 2) Patient will demonstrate (-) neural tension with SLR & prone knee flexion on R LE to facilitate driving activities with manageable sx. 3) Centralize R LE sx to level of l/s to facilitate return to pain-free work activities without peripheralization of sx.     4) Patient to report no c/o weakness in R LE when negotiating stairs or curbs.         PLAN  Yes  Continue plan of care  [x]  Upgrade activities as tolerated  []  Discharge due to :  []  Other:    Moisés Newell PTA    2/28/2023    8:56 AM    Future Appointments   Date Time Provider Reg Apple   2/28/2023  4:30 PM Moisés Newell PTA [facility]

## 2023-03-14 ENCOUNTER — APPOINTMENT (OUTPATIENT)
Facility: HOSPITAL | Age: 58
End: 2023-03-14
Payer: COMMERCIAL

## 2023-03-16 ENCOUNTER — APPOINTMENT (OUTPATIENT)
Facility: HOSPITAL | Age: 58
End: 2023-03-16
Payer: COMMERCIAL

## 2023-03-20 RX ORDER — LOSARTAN POTASSIUM AND HYDROCHLOROTHIAZIDE 12.5; 1 MG/1; MG/1
TABLET ORAL
Qty: 90 TABLET | Refills: 0 | Status: SHIPPED | OUTPATIENT
Start: 2023-03-20

## 2023-03-21 ENCOUNTER — APPOINTMENT (OUTPATIENT)
Facility: HOSPITAL | Age: 58
End: 2023-03-21
Payer: COMMERCIAL

## 2023-03-23 ENCOUNTER — HOSPITAL ENCOUNTER (OUTPATIENT)
Facility: HOSPITAL | Age: 58
Setting detail: RECURRING SERIES
End: 2023-03-23
Payer: COMMERCIAL

## 2023-03-28 ENCOUNTER — HOSPITAL ENCOUNTER (OUTPATIENT)
Facility: HOSPITAL | Age: 58
Setting detail: RECURRING SERIES
Discharge: HOME | End: 2023-03-31
Payer: COMMERCIAL

## 2023-03-28 PROCEDURE — 97140 MANUAL THERAPY 1/> REGIONS: CPT

## 2023-03-28 PROCEDURE — 97110 THERAPEUTIC EXERCISES: CPT

## 2023-03-28 NOTE — PROGRESS NOTES
PHYSICAL / OCCUPATIONAL THERAPY - DAILY TREATMENT NOTE (updated )    Patient Name: Radha Muse    Date: 3/28/2023    : 1965  Insurance: Payor: Javy Rashid / Plan: Kya Cue / Product Type: *No Product type* /      Patient  verified Yes     Visit #   Current / Total 10    Time   In / Out 540 625   Pain   In / Out 3/10 1/10   Subjective Functional Status/Changes: Back back to where it was. Leaving again on the  and returns the . Changes to:  Meds, Allergies, Med Hx, Sx Hx? If yes, update Summary List no       TREATMENT AREA =  No admission diagnoses are documented for this encounter. OBJECTIVE    Modalities Rationale:     decrease inflammation, decrease pain, and increase tissue extensibility to improve patient's ability to progress to PLOF and address remaining functional goals. min [x] Estim Unattended, type/location:  IFC L/S and glutes                                    [x]  w/ice    []  w/heat    min [] Estim Attended, type/location:                                     []  w/US     []  w/ice    []  w/heat    []  TENS insruct      min []  Mechanical Traction: type/lbs                   []  pro   []  sup   []  int   []  cont    []  before manual    []  after manual    min []  Ultrasound, settings/location:      min []  Iontophoresis w/ dexamethasone, location:                                               []  take home patch       []  in clinic    min  unbilled []  Ice     []  Heat    location/position:     min []  Paraffin,  details:     min []  Vasopneumatic Device, press/temp:     min []  Maine Todd / Mis Colesen: If using vaso (only need to measure limb vaso being performed on)      pre-treatment girth :       post-treatment girth :       measured at (landmark location) :      min []  Other:    Skin assessment post-treatment (if applicable):    []  intact    []  redness- no adverse reaction                 []redness - adverse reaction:         Therapeutic Procedures:   Tx

## 2023-03-30 ENCOUNTER — HOSPITAL ENCOUNTER (OUTPATIENT)
Facility: HOSPITAL | Age: 58
Setting detail: RECURRING SERIES
End: 2023-03-30
Payer: COMMERCIAL

## 2023-03-30 PROCEDURE — 97110 THERAPEUTIC EXERCISES: CPT

## 2023-03-30 PROCEDURE — G0283 ELEC STIM OTHER THAN WOUND: HCPCS

## 2023-03-30 PROCEDURE — 97140 MANUAL THERAPY 1/> REGIONS: CPT

## 2023-03-30 NOTE — PROGRESS NOTES
Rationale, Specifics   20 20 [id number] Manual Therapy (timed):  decrease pain, increase ROM, and increase tissue extensibility to improve patient's ability to progress to PLOF and address remaining functional goals. The manual therapy interventions were performed at a separate and distinct time from the therapeutic activities interventions . (see flow sheet as applicable)     Details if applicable:  DTM L/S paraspinals on R prone over 2 pillows   L/R PA mob in neutral and in hip ER positions  Prone hip Er stretching    10 10 11115 Therapeutic Exercise (timed):  increase ROM, strength, coordination, balance, and proprioception to improve patient's ability to progress to PLOF and address remaining functional goals. (see flow sheet as applicable)     Details if applicable:    Pt shown child pose to L , hailey stretch, and PPT for opening of facet on R   Also shown LAX ball standing to R glut for STM and hip ER stretch      90125 Self Care/Home Management (timed):  improve patient knowledge and understanding of pain reducing techniques, diagnosis/prognosis, physical therapy expectations, procedures and progression, and assess goals and functional mobility progrees  to improve patient's ability to progress to PLOF and address remaining functional goals.   (see flow sheet as applicable)       Details if applicable:            Details if applicable:            Details if applicable:     27 30 [facility] Totals Reminder: bill using total billable min of TIMED therapeutic procedures (example: do not include dry needle or estim unattended, both untimed codes, in totals to left)  8-22 min = 1 unit; 23-37 min = 2 units; 38-52 min = 3 units; 53-67 min = 4 units; 68-82 min = 5 units   Total Total     [x]  Patient Education billed concurrently with other procedures   [x] Review HEP    [] Progressed/Changed HEP, detail:    [] Other detail:       Objective Information/Functional Measures/Assessment    (+) quadrant test on R with reproduction or curbs.         PLAN  Yes  Continue plan of care  [x]  Upgrade activities as tolerated  []  Discharge due to :  []  Other:    Kelli Brown PT    3/30/2023    10:03 AM    Future Appointments   Date Time Provider Reg Apple   3/30/2023  5:30 PM Kelli Brown, PT [facility]   4/4/2023  6:00 PM Kelli Brown PT [facility]   4/6/2023  6:00 PM Kelli Brown PT [facility]   4/10/2023  8:30 AM Kelli Brown PT [facility]

## 2023-04-04 ENCOUNTER — HOSPITAL ENCOUNTER (OUTPATIENT)
Facility: HOSPITAL | Age: 58
Setting detail: RECURRING SERIES
Discharge: HOME | End: 2023-04-07
Payer: COMMERCIAL

## 2023-04-04 PROCEDURE — 97535 SELF CARE MNGMENT TRAINING: CPT

## 2023-04-04 PROCEDURE — 97140 MANUAL THERAPY 1/> REGIONS: CPT

## 2023-04-04 NOTE — THERAPY RECERTIFICATION
[address] [facility] PHYSICAL THERAPY  [address] [facility]. Thania Vinson Herman, [address] [place] [address] Ph: [phone] Fx: [phone]  PHYSICAL THERAPY PROGRESS NOTE  Patient Name: Anastasia Flores : 1965   Treatment/Medical Diagnosis: Other low back pain   Referral Source: Torrie Painting MD     Date of Initial Visit: 23 Attended Visits: 12 Missed Visits: 0     SUMMARY OF TREATMENT  Pt seen in clinic for 12 visits to address low back pain symptoms. Pt has been assessed, completed therapeutic exercises, neuromuscular re-ed, therapeutic functional activity, received manual therapy intervention, self-care strategies, HEP techniques and pt ed on condition consistency and follow-through. SUBJECTIVE: had a long day at work. Sitting down felt all right. Today he was feeling about a 2/10 pain in his low back without pain in the R leg. Still getting some burning sensation down the back of the R thigh to the knee area. Pt leaves next week for [place] for 1.5 months     CURRENT STATUS    GROC: +4 moderately better   Patient reports max pain 3/10 laying on R side and standing for length of time >1 hour, least pain 0/10 with sitting up , current pain 2/10. Patient reports ~ 75% overall improvement with performance of all general ADLs. Patient reports sitting tolerance:no problems unless driving in a car for a long time, standing tolerance: 60 minutes, walking tolerance:  minutes. Patient will reports being able to sleep throughout the night without disturbance when sleeping on the L side  Pt does report the stretching does help his low back. OBJECTIVE:   Lumbar AROM   Flexion 90% no p! Extension 50% p! In R LE   L SB 60% no p!  R SB 50% poor lumbar mobility p! Down R LE  L rot 90% no p!  R rot 80% p!  Down the R LE     Standing L side glide caused R low back pain     SLR - (-)  Prone knee flexion -(+)  Decreased B quad length with low back pain with B LE  Stairs - IND no pain

## 2023-04-04 NOTE — PROGRESS NOTES
Therapy (timed):  decrease pain, increase ROM, and increase tissue extensibility to improve patient's ability to progress to PLOF and address remaining functional goals. The manual therapy interventions were performed at a separate and distinct time from the therapeutic activities interventions . (see flow sheet as applicable)     Details if applicable:  DTM L/S paraspinals on R prone over 2 pillows   L S/L with pillow under L flank for L SB: DTM along R QL, inferior pelvis mobs    L/R PA mob in neutral and in hip ER positions (ND)  Prone hip Er stretching (ND)   5 5 [id number] Therapeutic Exercise (timed):  increase ROM, strength, coordination, balance, and proprioception to improve patient's ability to progress to PLOF and address remaining functional goals. (see flow sheet as applicable)     Details if applicable:       15 15  [id number] Self Care/Home Management (timed):  improve patient knowledge and understanding of pain reducing techniques, diagnosis/prognosis, physical therapy expectations, procedures and progression, and assess goals and functional mobility progrees  to improve patient's ability to progress to PLOF and address remaining functional goals.   (see flow sheet as applicable)       Details if applicable:    Reassessment performed   Discussion of flexion directional preference             Details if applicable:            Details if applicable:     28 35 [facility] Totals Reminder: bill using total billable min of TIMED therapeutic procedures (example: do not include dry needle or estim unattended, both untimed codes, in totals to left)  8-22 min = 1 unit; 23-37 min = 2 units; 38-52 min = 3 units; 53-67 min = 4 units; 68-82 min = 5 units   Total Total     [x]  Patient Education billed concurrently with other procedures   [x] Review HEP    [] Progressed/Changed HEP, detail:    [] Other detail:       Objective Information/Functional Measures/Assessment    (+) quadrant test on R with reproduction of LE symptoms   R

## 2023-06-29 ENCOUNTER — HOSPITAL ENCOUNTER (OUTPATIENT)
Facility: HOSPITAL | Age: 58
Setting detail: RECURRING SERIES
End: 2023-06-29
Payer: COMMERCIAL

## 2023-06-29 PROCEDURE — 97161 PT EVAL LOW COMPLEX 20 MIN: CPT

## 2023-06-29 PROCEDURE — 97140 MANUAL THERAPY 1/> REGIONS: CPT

## 2023-07-10 ENCOUNTER — HOSPITAL ENCOUNTER (OUTPATIENT)
Facility: HOSPITAL | Age: 58
Setting detail: RECURRING SERIES
Discharge: HOME | End: 2023-07-13
Payer: COMMERCIAL

## 2023-07-10 ENCOUNTER — APPOINTMENT (OUTPATIENT)
Facility: HOSPITAL | Age: 58
End: 2023-07-10
Payer: COMMERCIAL

## 2023-07-10 PROCEDURE — 97140 MANUAL THERAPY 1/> REGIONS: CPT

## 2023-07-10 PROCEDURE — 97530 THERAPEUTIC ACTIVITIES: CPT

## 2023-07-10 PROCEDURE — 97110 THERAPEUTIC EXERCISES: CPT

## 2023-07-10 NOTE — PROGRESS NOTES
PHYSICAL / OCCUPATIONAL THERAPY - DAILY TREATMENT NOTE (updated )    Patient Name: Juliana Ji    Date: 7/10/2023    : 1965  Insurance: Payor: Vivien Greene / Plan: Melvina Urrutia / Product Type: *No Product type* /      Patient  verified Yes     Visit #   Current / Total 2 10   Time   In / Out 5:09 pm 6:02 pm   Pain   In / Out 1-2-low back 1   Subjective Functional Status/Changes: Pt reports soreness in low back. Pt reports no numbness R LE ~ 1 week and 2 days. TREATMENT AREA =  Other low back pain [M54.59]    OBJECTIVE    Modalities Rationale:     decrease edema, decrease inflammation, decrease pain, and increase tissue extensibility to improve patient's ability to progress to PLOF and address remaining functional goals. min [] Estim Unattended, type/location:                                      []  w/ice    []  w/heat    min [] Estim Attended, type/location:                                     []  w/US     []  w/ice    []  w/heat    []  TENS insruct      min []  Mechanical Traction: type/lbs                   []  pro   []  sup   []  int   []  cont    []  before manual    []  after manual    min []  Ultrasound, settings/location:      min []  Iontophoresis w/ dexamethasone, location:                                               []  take home patch       []  in clinic   10 min  unbill []  Ice     []  Heat    location/position:     min []  Paraffin,  details:     min []  Vasopneumatic Device, press/temp:     min []  Saadia Caldwell / Gloria Sea: If using vaso (only need to measure limb vaso being performed on)      pre-treatment girth :       post-treatment girth :       measured at (landmark location) :      min []  Other:    Skin assessment post-treatment (if applicable):    [x]  intact    []  redness- no adverse reaction                 []redness - adverse reaction:         Therapeutic Procedures:     Tx Min Billable or 1:1 Min (if diff from Tx Min) Procedure, Rationale, Specifics   23  [id number]

## 2023-07-12 ENCOUNTER — HOSPITAL ENCOUNTER (OUTPATIENT)
Facility: HOSPITAL | Age: 58
Setting detail: RECURRING SERIES
Discharge: HOME | End: 2023-07-15
Payer: COMMERCIAL

## 2023-07-12 ENCOUNTER — APPOINTMENT (OUTPATIENT)
Facility: HOSPITAL | Age: 58
End: 2023-07-12
Payer: COMMERCIAL

## 2023-07-12 PROCEDURE — 97110 THERAPEUTIC EXERCISES: CPT

## 2023-07-12 PROCEDURE — 97140 MANUAL THERAPY 1/> REGIONS: CPT

## 2023-07-12 NOTE — PROGRESS NOTES
PHYSICAL / OCCUPATIONAL THERAPY - DAILY TREATMENT NOTE (updated )    Patient Name: Demarcus Rios    Date: 2023    : 1965  Insurance: Payor: Damion Posada / Plan: Nasir Villanueva / Product Type: *No Product type* /      Patient  verified   Yes     Visit #   Current / Total 3 10   Time   In / Out 5:06 pm 5:52    Pain   In / Out 1 1   Subjective Functional Status/Changes: Pt reports no HEP yesterday. Pain mid low back . No radicular sx     TREATMENT AREA =  Other low back pain [M54.59]    OBJECTIVE    Modalities Rationale:     decrease edema, decrease inflammation, decrease pain, and increase tissue extensibility to improve patient's ability to progress to PLOF and address remaining functional goals. min [] Estim Unattended, type/location:                                      []  w/ice    []  w/heat    min [] Estim Attended, type/location:                                     []  w/US     []  w/ice    []  w/heat    []  TENS insruct      min []  Mechanical Traction: type/lbs                   []  pro   []  sup   []  int   []  cont    []  before manual    []  after manual    min []  Ultrasound, settings/location:      min []  Iontophoresis w/ dexamethasone, location:                                               []  take home patch       []  in clinic   10 min  unbill []  Ice     [x]  Heat    location/position: Supine low back    min []  Paraffin,  details:     min []  Vasopneumatic Device, press/temp:     min []  Silvina Nab / Pamula Blind: If using vaso (only need to measure limb vaso being performed on)      pre-treatment girth :       post-treatment girth :       measured at (landmark location) :      min []  Other:    Skin assessment post-treatment (if applicable):    [x]  intact    []  redness- no adverse reaction                 []redness - adverse reaction:         Therapeutic Procedures:     Tx Min Billable or 1:1 Min (if diff from Tx Min) Procedure, Rationale, Specifics   14  60652 Therapeutic

## 2023-07-17 ENCOUNTER — APPOINTMENT (OUTPATIENT)
Facility: HOSPITAL | Age: 58
End: 2023-07-17
Payer: COMMERCIAL

## 2023-07-17 ENCOUNTER — HOSPITAL ENCOUNTER (OUTPATIENT)
Facility: HOSPITAL | Age: 58
Setting detail: RECURRING SERIES
Discharge: HOME | End: 2023-07-20
Payer: COMMERCIAL

## 2023-07-17 PROCEDURE — 97112 NEUROMUSCULAR REEDUCATION: CPT

## 2023-07-17 PROCEDURE — 97530 THERAPEUTIC ACTIVITIES: CPT

## 2023-07-17 PROCEDURE — G0283 ELEC STIM OTHER THAN WOUND: HCPCS

## 2023-07-17 PROCEDURE — 97110 THERAPEUTIC EXERCISES: CPT

## 2023-07-19 ENCOUNTER — APPOINTMENT (OUTPATIENT)
Facility: HOSPITAL | Age: 58
End: 2023-07-19
Payer: COMMERCIAL

## 2023-07-19 ENCOUNTER — HOSPITAL ENCOUNTER (OUTPATIENT)
Facility: HOSPITAL | Age: 58
Setting detail: RECURRING SERIES
Discharge: HOME | End: 2023-07-22
Payer: COMMERCIAL

## 2023-07-19 PROCEDURE — 97110 THERAPEUTIC EXERCISES: CPT

## 2023-07-19 PROCEDURE — 97112 NEUROMUSCULAR REEDUCATION: CPT

## 2023-07-19 PROCEDURE — G0283 ELEC STIM OTHER THAN WOUND: HCPCS

## 2023-07-19 PROCEDURE — 97530 THERAPEUTIC ACTIVITIES: CPT

## 2023-07-19 NOTE — PROGRESS NOTES
coordination, balance, and proprioception to improve patient's ability to progress to PLOF and address remaining functional goals. (see flow sheet as applicable)     Details if applicable:     11  05593 Therapeutic Activity (timed):  use of dynamic activities replicating functional movements to increase ROM, strength, coordination, balance, and proprioception in order to improve patient's ability to progress to PLOF and address remaining functional goals. (see flow sheet as applicable)     Details if applicable:     13  53098 Neuromuscular Re-Education (timed):  improve balance, coordination, kinesthetic sense, posture, core stability and proprioception to improve patient's ability to develop conscious control of individual muscles and awareness of position of extremities in order to progress to PLOF and address remaining functional goals.  (see flow sheet as applicable)     Details if applicable:     40  [facility] Totals Reminder: bill using total billable min of TIMED therapeutic procedures (example: do not include dry needle or estim unattended, both untimed codes, in totals to left)  8-22 min = 1 unit; 23-37 min = 2 units; 38-52 min = 3 units; 53-67 min = 4 units; 68-82 min = 5 units   Total Total     [x]  Patient Education billed concurrently with other procedures   [x] Review HEP    [] Progressed/Changed HEP, detail:    [] Other detail:       Objective Information/Functional Measures/Assessment    Review sit to stand body mechanics with hip hinge  Mild fatigue with side lying hip abduction  Supine bridge AROM ~ 50%    Patient will continue to benefit from skilled PT / OT services to modify and progress therapeutic interventions, analyze and address functional mobility deficits, analyze and address ROM deficits, analyze and address strength deficits, analyze and address soft tissue restrictions, analyze and cue for proper movement patterns, analyze and modify for postural abnormalities, and instruct in home and

## 2023-07-24 ENCOUNTER — APPOINTMENT (OUTPATIENT)
Facility: HOSPITAL | Age: 58
End: 2023-07-24
Payer: COMMERCIAL

## 2023-07-24 ENCOUNTER — HOSPITAL ENCOUNTER (OUTPATIENT)
Facility: HOSPITAL | Age: 58
Setting detail: RECURRING SERIES
Discharge: HOME | End: 2023-07-27
Payer: COMMERCIAL

## 2023-07-24 PROCEDURE — 97112 NEUROMUSCULAR REEDUCATION: CPT

## 2023-07-24 PROCEDURE — 97535 SELF CARE MNGMENT TRAINING: CPT

## 2023-07-24 PROCEDURE — 97110 THERAPEUTIC EXERCISES: CPT

## 2023-07-24 PROCEDURE — G0283 ELEC STIM OTHER THAN WOUND: HCPCS

## 2023-07-24 NOTE — PROGRESS NOTES
PHYSICAL / OCCUPATIONAL THERAPY - DAILY TREATMENT NOTE (updated )    Patient Name: Naomy Ho    Date: 2023    : 1965  Insurance: Payor: Asia Jaimes / Plan: Issa Carnes / Product Type: *No Product type* /      Patient  verified Yes     Visit #   Current / Total 6 10   Time   In / Out 4:27 pm 5:26 pm   Pain   In / Out 1 0   Subjective Functional Status/Changes: Pt states he did a lot of yard work over the weekend. Feeling mostly stiff     TREATMENT AREA =  Other low back pain [M54.59]    OBJECTIVE    Modalities Rationale:     decrease edema, decrease inflammation, decrease pain, and increase tissue extensibility to improve patient's ability to progress to PLOF and address remaining functional goals. 10 min [x] Estim Unattended, type/location: IFC low back, post hip supine                                     []  w/ice    [x]  w/heat    min [] Estim Attended, type/location:                                     []  w/US     []  w/ice    []  w/heat    []  TENS insruct      min []  Mechanical Traction: type/lbs                   []  pro   []  sup   []  int   []  cont    []  before manual    []  after manual    min []  Ultrasound, settings/location:      min []  Iontophoresis w/ dexamethasone, location:                                               []  take home patch       []  in clinic    min  unbill []  Ice     []  Heat    location/position:     min []  Paraffin,  details:     min []  Vasopneumatic Device, press/temp:     min []  Rasta Armando / James Brenda: If using vaso (only need to measure limb vaso being performed on)      pre-treatment girth :       post-treatment girth :       measured at (landmark location) :      min []  Other:    Skin assessment post-treatment (if applicable):    [x]  intact    []  redness- no adverse reaction                 []redness - adverse reaction:         Therapeutic Procedures:     Tx Min Billable or 1:1 Min (if diff from [employer]) Procedure, Rationale, Specifics

## 2023-07-26 ENCOUNTER — APPOINTMENT (OUTPATIENT)
Facility: HOSPITAL | Age: 58
End: 2023-07-26
Payer: COMMERCIAL

## 2023-07-26 ENCOUNTER — HOSPITAL ENCOUNTER (OUTPATIENT)
Facility: HOSPITAL | Age: 58
Setting detail: RECURRING SERIES
Discharge: HOME | End: 2023-07-29
Payer: COMMERCIAL

## 2023-07-26 PROCEDURE — G0283 ELEC STIM OTHER THAN WOUND: HCPCS

## 2023-07-26 PROCEDURE — 97535 SELF CARE MNGMENT TRAINING: CPT

## 2023-07-26 PROCEDURE — 97110 THERAPEUTIC EXERCISES: CPT

## 2023-07-26 NOTE — PROGRESS NOTES
PHYSICAL / OCCUPATIONAL THERAPY - DAILY TREATMENT NOTE (updated )    Patient Name: Naomy Ho    Date: 2023    : 1965  Insurance: Payor: [address] [place] [address] Drive / Plan: Issa Mitchs / Product Type: *No Product type* /      Patient  verified Yes     Visit #   Current / Total 7 10   Time   In / Out 5:05 pm 6:13    Pain   In / Out 1 1   Subjective Functional Status/Changes: Pt reports no soreness after last session . TREATMENT AREA =  Other low back pain [M54.59]    OBJECTIVE    Modalities Rationale:     decrease edema, decrease inflammation, decrease pain, and increase tissue extensibility to improve patient's ability to progress to PLOF and address remaining functional goals. 10 min [x] Estim Unattended, type/location: Supine IFC low back                                     []  w/ice    [x]  w/heat    min [] Estim Attended, type/location:                                     []  w/US     []  w/ice    []  w/heat    []  TENS insruct      min []  Mechanical Traction: type/lbs                   []  pro   []  sup   []  int   []  cont    []  before manual    []  after manual    min []  Ultrasound, settings/location:      min []  Iontophoresis w/ dexamethasone, location:                                               []  take home patch       []  in clinic    min  unbill []  Ice     []  Heat    location/position:     min []  Paraffin,  details:     min []  Vasopneumatic Device, press/temp:     min []  Rasta Armando / James Brenda: If using vaso (only need to measure limb vaso being performed on)      pre-treatment girth :       post-treatment girth :       measured at (landmark location) :      min []  Other:    Skin assessment post-treatment (if applicable):    [x]  intact    []  redness- no adverse reaction                 []redness - adverse reaction:         Therapeutic Procedures:     Tx Min Billable or 1:1 Min (if diff from Tx Min) Procedure, Rationale, Specifics   36 [phone] [id number] Therapeutic Exercise (timed):

## 2023-07-31 ENCOUNTER — APPOINTMENT (OUTPATIENT)
Facility: HOSPITAL | Age: 58
End: 2023-07-31
Payer: COMMERCIAL

## 2023-07-31 ENCOUNTER — HOSPITAL ENCOUNTER (OUTPATIENT)
Facility: HOSPITAL | Age: 58
Setting detail: RECURRING SERIES
Discharge: HOME | End: 2023-08-03
Payer: COMMERCIAL

## 2023-07-31 PROCEDURE — 97535 SELF CARE MNGMENT TRAINING: CPT

## 2023-07-31 PROCEDURE — 97112 NEUROMUSCULAR REEDUCATION: CPT

## 2023-07-31 PROCEDURE — G0283 ELEC STIM OTHER THAN WOUND: HCPCS

## 2023-07-31 PROCEDURE — 97110 THERAPEUTIC EXERCISES: CPT

## 2023-07-31 NOTE — PROGRESS NOTES
PHYSICAL / OCCUPATIONAL THERAPY - DAILY TREATMENT NOTE (updated )    Patient Name: Ml Frye    Date: 2023    : 1965  Insurance: Payor: Samuel Josue / Plan: Diana De Los Santos / Product Type: *No Product type* /      Patient  verified Yes     Visit #   Current / Total 8 12   Time   In / Out 4:25 pm 5:21    Pain   In / Out 1 0   Subjective Functional Status/Changes: Pain range: 1 to 2/10; average pain: ~ 1/10   Pain mostly first thing in am when he wakes; the pain gets better as the day progresses . Jacky Needs more than a mile -not limited at all     TREATMENT AREA =  Other low back pain [M54.59]    OBJECTIVE    Modalities Rationale:     decrease edema, decrease inflammation, decrease pain, and increase tissue extensibility to improve patient's ability to progress to PLOF and address remaining functional goals. 10 min [x] Estim Unattended, type/location:  IFC low back, post hip                                    []  w/ice    []  w/heat    min [] Estim Attended, type/location:                                     []  w/US     []  w/ice    []  w/heat    []  TENS insruct      min []  Mechanical Traction: type/lbs                   []  pro   []  sup   []  int   []  cont    []  before manual    []  after manual    min []  Ultrasound, settings/location:      min []  Iontophoresis w/ dexamethasone, location:                                               []  take home patch       []  in clinic    min  unbill []  Ice     []  Heat    location/position:     min []  Paraffin,  details:     min []  Vasopneumatic Device, press/temp:     min []  Starleen Alexus / Verlon Brightly:     If using vaso (only need to measure limb vaso being performed on)      pre-treatment girth :       post-treatment girth :       measured at (landmark location) :      min []  Other:    Skin assessment post-treatment (if applicable):    []  intact    []  redness- no adverse reaction                 []redness - adverse reaction:         Therapeutic

## 2023-07-31 NOTE — PROGRESS NOTES
[address] [email or web address] PHYSICAL THERAPY  [address] [facility], [address], [place], [address] Ph: [phone] Fx: [phone]  PHYSICAL THERAPY PROGRESS NOTE  Patient Name: Earnestine Gibson : 1965   Treatment/Medical Diagnosis: Other low back pain [M54.59]   Referral Source: Joby Vasquez MD     Date of Initial Visit: 23 Attended Visits: 8 Missed Visits: 0     SUMMARY OF TREATMENT  Physical Therapy treatment has consisted of Therapeutic exercise for lumbar ROM and stabilization strengthening, Therapeutic Activity for proper posture , body mechanics, actiivty modification for pain management, Manual Therapy, Interferential electrical stimulation, HEP, and MH. CURRENT STATUS  Mr. Vianca Romero has progressed well in physical therapy reporting decreasing pain in ADLs and no R LE radicular numbness > 4 weeks, improving Lumbar AROM , and he is demonstrating improving core stabilization strength and SI mobility. Pain range: 1 to 2/10; average pain: ~ 1/10   Pain mostly first thing in am when he wakes; the pain gets better as the day progresses . Tivis Piety more than a mile -not limited at all  Participating in recreation , performing moderate activities, like moving a table, carrying groceries, pushing a vacuum -limited a little, Performing vigorous activities like running, lifting heavy objects -limited a lot      Pt will increase FOTO score to 81 points to improve ability to perform ADLs. - not met: FOTO: 67   Status at last note/certification: 67 points  Status at last Eval: 51  Current Status: 67   Goal Met? Progressing     2. Pt will report an improvement in at worst pain to 5/10 to improve ability to tolerate daily tasks. Status at last note/certification: 62/61  Status at last Eval: z  Current Status: 2/10  Goal Met?  yes    3. Pt will increase AROM l/s EXT/B SB/right rotation to WNL with minimal to no increased pain to improve ability to perform work activities.   Status at

## 2023-08-02 ENCOUNTER — APPOINTMENT (OUTPATIENT)
Facility: HOSPITAL | Age: 58
End: 2023-08-02
Payer: COMMERCIAL

## 2023-08-02 ENCOUNTER — HOSPITAL ENCOUNTER (OUTPATIENT)
Facility: HOSPITAL | Age: 58
Setting detail: RECURRING SERIES
Discharge: HOME | End: 2023-08-05
Payer: COMMERCIAL

## 2023-08-02 PROCEDURE — 97110 THERAPEUTIC EXERCISES: CPT

## 2023-08-02 PROCEDURE — G0283 ELEC STIM OTHER THAN WOUND: HCPCS

## 2023-08-02 PROCEDURE — 97535 SELF CARE MNGMENT TRAINING: CPT

## 2023-08-02 NOTE — PROGRESS NOTES
PHYSICAL / OCCUPATIONAL THERAPY - DAILY TREATMENT NOTE (updated )    Patient Name: Ramandeep Montero    Date: 2023    : 1965  Insurance: Payor: Tomas Pyle / Plan: Amna Roberto / Product Type: *No Product type* /      Patient  verified Yes     Visit #   Current / Total 9 12   Time   In / Out 5:17 pm 6:35 pm   Pain   In / Out 2 0   Subjective Functional Status/Changes: Pt reports mild low back soreness today     TREATMENT AREA =  Other low back pain [M54.59]    OBJECTIVE    Modalities Rationale:     decrease edema and decrease pain to improve patient's ability to progress to PLOF and address remaining functional goals. 10 min [x] Estim Unattended, type/location: IFC low back, post hip                                     []  w/ice    [x]  w/heat    min [] Estim Attended, type/location:                                     []  w/US     []  w/ice    []  w/heat    []  TENS insruct      min []  Mechanical Traction: type/lbs                   []  pro   []  sup   []  int   []  cont    []  before manual    []  after manual    min []  Ultrasound, settings/location:      min []  Iontophoresis w/ dexamethasone, location:                                               []  take home patch       []  in clinic    min  unbill []  Ice     []  Heat    location/position:     min []  Paraffin,  details:     min []  Vasopneumatic Device, press/temp:     min []  Valmarcelae Jeisonh / Savannah Pardon: If using vaso (only need to measure limb vaso being performed on)      pre-treatment girth :       post-treatment girth :       measured at (landmark location) :      min []  Other:    Skin assessment post-treatment (if applicable):    [x]  intact    []  redness- no adverse reaction                 []redness - adverse reaction:         Therapeutic Procedures:     Tx Min Billable or 1:1 Min (if diff from Tx Min) Procedure, Rationale, Specifics   27 9 [id number] Therapeutic Exercise (timed):  increase ROM, strength, coordination, balance, and

## 2023-08-07 ENCOUNTER — HOSPITAL ENCOUNTER (OUTPATIENT)
Facility: HOSPITAL | Age: 58
Setting detail: RECURRING SERIES
Discharge: HOME | End: 2023-08-10
Payer: COMMERCIAL

## 2023-08-07 PROCEDURE — 97112 NEUROMUSCULAR REEDUCATION: CPT

## 2023-08-07 PROCEDURE — 97110 THERAPEUTIC EXERCISES: CPT

## 2023-08-07 PROCEDURE — 97530 THERAPEUTIC ACTIVITIES: CPT

## 2023-08-07 PROCEDURE — G0283 ELEC STIM OTHER THAN WOUND: HCPCS

## 2023-08-07 NOTE — PROGRESS NOTES
PHYSICAL / OCCUPATIONAL THERAPY - DAILY TREATMENT NOTE (updated )    Patient Name: Oswaldo Andujar    Date: 2023    : 1965  [facility] / Plan: Dang Vasquez / Product Type: *No Product type* /      Patient  verified Yes     Visit #   Current / Total 10 12   Time   In / Out 5:03 6:01   Pain   In / Out 1/10 0/10   Subjective Functional Status/Changes: Pain lingers at 1/10, still has pain after activities involving bending/lifting which is biggest area he'd like to improve     TREATMENT AREA =  Other low back pain [M54.59]    OBJECTIVE    Modalities Rationale:     decrease inflammation, decrease pain, and increase tissue extensibility to improve patient's ability to progress to PLOF and address remaining functional goals. 10 post min [x] Estim Unattended, type/location:  IFC LS, supine                                    []  w/ice    [x]  w/heat    min [] Estim Attended, type/location:                                     []  w/US     []  w/ice    []  w/heat    []  TENS insruct      min []  Mechanical Traction: type/lbs                   []  pro   []  sup   []  int   []  cont    []  before manual    []  after manual    min []  Ultrasound, settings/location:      min []  Iontophoresis w/ dexamethasone, location:                                               []  take home patch       []  in clinic    min  unbill []  Ice     []  Heat    location/position:     min []  Paraffin,  details:     min []  Vasopneumatic Device, press/temp:     min []  Lc Powell / Taiwo Railing: If using vaso (only need to measure limb vaso being performed on)      pre-treatment girth :       post-treatment girth :       measured at (landmark location) :      min []  Other:    Skin assessment post-treatment (if applicable):    [x]  intact    []  redness- no adverse reaction                 []redness - adverse reaction:         Therapeutic Procedures:     Tx Min Billable or 1:1 Min (if diff from [employer]) Procedure,

## 2023-08-09 ENCOUNTER — HOSPITAL ENCOUNTER (OUTPATIENT)
Facility: HOSPITAL | Age: 58
Setting detail: RECURRING SERIES
Discharge: HOME | End: 2023-08-12
Payer: COMMERCIAL

## 2023-08-09 PROCEDURE — 97112 NEUROMUSCULAR REEDUCATION: CPT

## 2023-08-09 PROCEDURE — G0283 ELEC STIM OTHER THAN WOUND: HCPCS

## 2023-08-09 PROCEDURE — 97530 THERAPEUTIC ACTIVITIES: CPT

## 2023-08-09 PROCEDURE — 97110 THERAPEUTIC EXERCISES: CPT

## 2023-08-09 NOTE — PROGRESS NOTES
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 10 min [x] Estim Unattended, type/location:  IFC low back                                    []  w/ice    []  w/heat    min [] Estim Attended, type/location:                                     []  w/US     []  w/ice    []  w/heat    []  TENS insruct      min []  Mechanical Traction: type/lbs                   []  pro   []  sup   []  int   []  cont    []  before manual    []  after manual    min []  Ultrasound, settings/location:      min []  Iontophoresis w/ dexamethasone, location:                                               []  take home patch       []  in clinic    min  unbill []  Ice     []  Heat    location/position:     min []  Paraffin,  details:     min []  Vasopneumatic Device, press/temp:     min []  Hermes Vaughn / Delmarcelaa Colin: If using vaso (only need to measure limb vaso being performed on)      pre-treatment girth :       post-treatment girth :       measured at (landmark location) :      min []  Other:    Skin assessment post-treatment (if applicable):    []  intact    []  redness- no adverse reaction                 []redness - adverse reaction:         Therapeutic Procedures:     Tx Min Billable or 1:1 Min (if diff from Tx Min) Procedure, Rationale, Specifics   [phone] [id number] Therapeutic

## 2023-08-14 ENCOUNTER — HOSPITAL ENCOUNTER (OUTPATIENT)
Facility: HOSPITAL | Age: 58
Setting detail: RECURRING SERIES
Discharge: HOME | End: 2023-08-17
Payer: COMMERCIAL

## 2023-08-14 PROCEDURE — 97530 THERAPEUTIC ACTIVITIES: CPT

## 2023-08-14 PROCEDURE — G0283 ELEC STIM OTHER THAN WOUND: HCPCS

## 2023-08-14 PROCEDURE — 97110 THERAPEUTIC EXERCISES: CPT

## 2023-08-14 PROCEDURE — 97112 NEUROMUSCULAR REEDUCATION: CPT

## 2023-08-16 ENCOUNTER — HOSPITAL ENCOUNTER (OUTPATIENT)
Facility: HOSPITAL | Age: 58
Setting detail: RECURRING SERIES
Discharge: HOME | End: 2023-08-19
Payer: COMMERCIAL

## 2023-08-16 PROCEDURE — 97530 THERAPEUTIC ACTIVITIES: CPT

## 2023-08-16 PROCEDURE — G0283 ELEC STIM OTHER THAN WOUND: HCPCS

## 2023-08-16 PROCEDURE — 97112 NEUROMUSCULAR REEDUCATION: CPT

## 2023-08-16 PROCEDURE — 97110 THERAPEUTIC EXERCISES: CPT

## 2023-08-16 NOTE — PROGRESS NOTES
PHYSICAL / OCCUPATIONAL THERAPY - DAILY TREATMENT NOTE (updated )    Patient Name: Earnestine Gibson    Date: 2023    : 1965  Insurance: Payor: Cory Perea / Plan: Cristo Kohler / Product Type: *No Product type* /      Patient  verified Yes     Visit #   Current / Total 13 20   Time   In / Out 5:00 6:05   Pain   In / Out 1-2/10 1/10   Subjective Functional Status/Changes: No new c/o, normally stiff in AM upon waking     TREATMENT AREA =  Other low back pain [M54.59]    OBJECTIVE    Modalities Rationale:     decrease inflammation, decrease pain, and increase tissue extensibility to improve patient's ability to progress to PLOF and address remaining functional goals. 10 post min [x] Estim Unattended, type/location:  IFC LS, supine with LE elevated                                    []  w/ice    [x]  w/heat    min [] Estim Attended, type/location:                                     []  w/US     []  w/ice    []  w/heat    []  TENS insruct      min []  Mechanical Traction: type/lbs                   []  pro   []  sup   []  int   []  cont    []  before manual    []  after manual    min []  Ultrasound, settings/location:      min []  Iontophoresis w/ dexamethasone, location:                                               []  take home patch       []  in clinic    min  unbill []  Ice     []  Heat    location/position:     min []  Paraffin,  details:     min []  Vasopneumatic Device, press/temp:     min []  Edilia Burner / Anju Manriquez: If using vaso (only need to measure limb vaso being performed on)      pre-treatment girth :       post-treatment girth :       measured at (landmark location) :      min []  Other:     Skin assessment post-treatment (if applicable):    [x]  intact    []  redness- no adverse reaction                 []redness - adverse reaction:         Therapeutic Procedures:     Tx Min Billable or 1:1 Min (if diff from Tx Min) Procedure, Rationale, Specifics   [phone] [id number] Therapeutic Exercise

## 2023-08-21 ENCOUNTER — HOSPITAL ENCOUNTER (OUTPATIENT)
Facility: HOSPITAL | Age: 58
Setting detail: RECURRING SERIES
Discharge: HOME | End: 2023-08-24
Payer: COMMERCIAL

## 2023-08-21 PROCEDURE — G0283 ELEC STIM OTHER THAN WOUND: HCPCS

## 2023-08-21 PROCEDURE — 97112 NEUROMUSCULAR REEDUCATION: CPT

## 2023-08-21 PROCEDURE — 97530 THERAPEUTIC ACTIVITIES: CPT

## 2023-08-21 PROCEDURE — 97110 THERAPEUTIC EXERCISES: CPT

## 2023-08-21 NOTE — PROGRESS NOTES
Progress Note/Recertification     Pt will increase FOTO score to 81 points to improve ability to perform ADLs.- n/a  Status at last note/certification: 67 points     2. Pt will increase AROM l/s EXT/B SB/right rotation to WNL with minimal to no increased pain to improve ability to perform work activities. - Progressing toward full ROM  Status at last note/certification: all % of WNL; pain with EXT, right SB and right rotation     3. Pt will report having minimal to no increased pain in the mornings to improve tolerance to daily and household mobility.   Status at last note/certification: pain is mostly in the mornings  Current: continues to report pain next day after manual/yard work/ lifting 8/21/23     Next PN/ RC due 8/31/23  Auth due ANUEL    PLAN  Yes  Continue plan of care  [x]  Upgrade activities as tolerated  []  Discharge due to :  []  Other:    Richard Herrera PTA    8/21/2023    6:02 PM    Future Appointments   Date Time Provider [address]   8/30/2023  5:00 PM [facility] [place] [facility]   9/1/2023  1:40 PM Richard Herrera PTA [facility]

## 2023-08-30 ENCOUNTER — HOSPITAL ENCOUNTER (OUTPATIENT)
Facility: HOSPITAL | Age: 58
Setting detail: RECURRING SERIES
Discharge: HOME | End: 2023-09-02
Payer: COMMERCIAL

## 2023-08-30 PROCEDURE — 97112 NEUROMUSCULAR REEDUCATION: CPT

## 2023-08-30 PROCEDURE — G0283 ELEC STIM OTHER THAN WOUND: HCPCS

## 2023-08-30 PROCEDURE — 97110 THERAPEUTIC EXERCISES: CPT

## 2023-08-30 PROCEDURE — 97530 THERAPEUTIC ACTIVITIES: CPT

## 2023-08-30 NOTE — PROGRESS NOTES
PHYSICAL / OCCUPATIONAL THERAPY - DAILY TREATMENT NOTE (updated )    Patient Name: Tata Salazar    Date: 2023    : 1965  Insurance: Payor: Josias Fitzpatrick / Plan: Flores Hands / Product Type: *No Product type* /      Patient  verified Yes     Visit #   Current / Total 15 20   Time   In / Out 5:04 pm 6:00 pm   Pain   In / Out 2- R knee; 2- low back 1   Subjective Functional Status/Changes: Pt reports he woke Saturday with right knee pain . 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 TREATMENT AREA =  Other low back pain [M54.59]    OBJECTIVE    Modalities Rationale:     decrease edema, decrease inflammation, decrease pain, and increase tissue extensibility to improve patient's ability to progress to PLOF and address remaining functional goals. 10 min [x] Estim Unattended, type/location: IFC low back, post hip                                      []  w/ice    [x]  w/heat    min [] Estim Attended, type/location:                                     []  w/US     []  w/ice    []  w/heat    []  TENS insruct      min []  Mechanical Traction: type/lbs                   []  pro   []  sup   []  int   []  cont    []  before manual    []  after manual    min []  Ultrasound, settings/location:      min  unbill []  Ice     []  Heat    location/position:     min []  Paraffin,  details:     min []  Vasopneumatic Device, press/temp:     min []  August Olivia / Zulay Clemens: If using vaso (only need to measure limb vaso being performed on)      pre-treatment girth :       post-treatment girth :       measured at (landmark location) :      min []  Other:    Skin assessment post-treatment:  Intact      Therapeutic Procedures: Tx Min Billable or 1:1 Min (if diff from Tx Min) Procedure, Rationale, Specifics   23  [id number] Therapeutic Exercise (timed):  increase ROM, strength, coordination, balance, and proprioception to improve patient's ability to progress to PLOF and address remaining functional goals.  (see flow sheet

## 2023-09-01 ENCOUNTER — HOSPITAL ENCOUNTER (OUTPATIENT)
Facility: HOSPITAL | Age: 58
Setting detail: RECURRING SERIES
Discharge: HOME | End: 2023-09-04
Payer: COMMERCIAL

## 2023-09-01 PROCEDURE — G0283 ELEC STIM OTHER THAN WOUND: HCPCS

## 2023-09-01 PROCEDURE — 97110 THERAPEUTIC EXERCISES: CPT

## 2023-09-01 PROCEDURE — 97112 NEUROMUSCULAR REEDUCATION: CPT

## 2023-09-01 PROCEDURE — 97535 SELF CARE MNGMENT TRAINING: CPT

## 2023-09-01 NOTE — PROGRESS NOTES
PHYSICAL / OCCUPATIONAL THERAPY - DAILY TREATMENT NOTE (updated )    Patient Name: Demarcus Rios    Date: 2023    : 1965  Insurance: Payor: Damion Posada / Plan: Nasir Villanueva / Product Type: *No Product type* /      Patient  verified Yes     Visit #   Current / Total 16 20   Time   In / Out 1:45 pm 3:05 pm   Pain   In / Out 0 0   Subjective Functional Status/Changes: Pt reports no radicular sx ~ 1 week. TREATMENT AREA =  Other low back pain [M54.59]    OBJECTIVE    Modalities Rationale:     decrease edema, decrease inflammation, decrease pain, and increase tissue extensibility to improve patient's ability to progress to PLOF and address remaining functional goals. 10 min [x] Estim Unattended, type/location:  IFC low back post hip                                    []  w/ice    []  w/heat    min [] Estim Attended, type/location:                                     []  w/US     []  w/ice    []  w/heat    []  TENS insruct      min []  Mechanical Traction: type/lbs                   []  pro   []  sup   []  int   []  cont    []  before manual    []  after manual    min []  Ultrasound, settings/location:      min  unbill []  Ice     []  Heat    location/position:     min []  Paraffin,  details:     min []  Vasopneumatic Device, press/temp:     min []  Silvina Nab / Pamula Blind: If using vaso (only need to measure limb vaso being performed on)      pre-treatment girth :       post-treatment girth :       measured at (landmark location) :      min []  Other:    Skin assessment post-treatment:   Intact      Therapeutic Procedures: Tx Min Billable or 1:1 Min (if diff from Tx Min) Procedure, Rationale, Specifics   [phone] [id number] Therapeutic Exercise (timed):  increase ROM, strength, coordination, balance, and proprioception to improve patient's ability to progress to PLOF and address remaining functional goals.  (see flow sheet as applicable)     Details if applicable:        [id number] Neuromuscular

## 2023-09-06 ENCOUNTER — HOSPITAL ENCOUNTER (OUTPATIENT)
Facility: HOSPITAL | Age: 58
Setting detail: RECURRING SERIES
Discharge: HOME | End: 2023-09-09
Payer: COMMERCIAL

## 2023-09-06 PROCEDURE — G0283 ELEC STIM OTHER THAN WOUND: HCPCS

## 2023-09-06 PROCEDURE — 97530 THERAPEUTIC ACTIVITIES: CPT

## 2023-09-06 PROCEDURE — 97110 THERAPEUTIC EXERCISES: CPT

## 2023-09-06 PROCEDURE — 97112 NEUROMUSCULAR REEDUCATION: CPT

## 2023-09-06 NOTE — PROGRESS NOTES
PHYSICAL / OCCUPATIONAL THERAPY - DAILY TREATMENT NOTE (updated )    Patient Name: Colby Hollis    Date: 2023    : 1965  Insurance: Payor: [facility] / Plan: Kevyn South / Product Type: *No Product type* /      Patient  verified Yes     Visit #   Current / Total 17 20   Time   In / Out 5:45 pm 6:53    Pain   In / Out 1 0   Subjective Functional Status/Changes: Pt reports numbness in R LE yesterday while shopping. Day before he did yard work. Fine all day Monday and Monday night. No issues at work     TREATMENT AREA =  Other low back pain [M54.59]    OBJECTIVE    Modalities Rationale:     decrease edema, decrease inflammation, decrease pain, and increase tissue extensibility to improve patient's ability to progress to PLOF and address remaining functional goals. 10 min [x] Estim Unattended, type/location:  IFC low back, Post                                    []  w/ice    []  w/heat    min [] Estim Attended, type/location:                                     []  w/US     []  w/ice    []  w/heat    []  TENS insruct      min []  Mechanical Traction: type/lbs                   []  pro   []  sup   []  int   []  cont    []  before manual    []  after manual    min []  Ultrasound, settings/location:      min  unbill []  Ice     []  Heat    location/position:     min []  Paraffin,  details:     min []  Vasopneumatic Device, press/temp:     min []  Claire Mike / Lugenia Nicely: If using vaso (only need to measure limb vaso being performed on)      pre-treatment girth :       post-treatment girth :       measured at (landmark location) :      min []  Other:    Skin assessment post-treatment:   Intact      Therapeutic Procedures:     Tx Min Billable or 1:1 Min (if diff from Tx Min) Procedure, Rationale, Specifics   37  [id number] Therapeutic Exercise (timed):  increase ROM, strength, coordination, balance, and proprioception to improve patient's ability to progress to PLOF and address remaining functional

## 2023-09-06 NOTE — THERAPY RECERTIFICATION
met    Payor: Gurmeet aDngelo / Plan: Dang Vasquez / Product Type: *No Product type* /     Non-Medicare, can change goals, can adjust or add frequency duration, no signature required      New Goals to be achieved in __4__ weeks  1. Pt will increase FOTO score to 81 points to improve ability to perform ADLs. Status at last note/certification: 67 points    2. Pt will increase AROM l/s EXT/B SB/right rotation to WNL with minimal to no increased pain to improve ability to perform work activities. 3. Pt will report having minimal to no increased pain in the mornings to improve tolerance to daily and household mobility. Frequency / Duration:   Patient to be seen   1-2   times per week for   4    weeks:    RECOMMENDATIONS  We would like to continue therapy for progress to goals stated above. Continue per initial Plan of Care. If you have any questions/comments please contact us directly. Thank you for allowing us to assist in the care of your patient.     Christina Cohn, PTA       9/6/2023       5:57 PM

## 2023-09-11 ENCOUNTER — HOSPITAL ENCOUNTER (OUTPATIENT)
Facility: HOSPITAL | Age: 58
Setting detail: RECURRING SERIES
Discharge: HOME | End: 2023-09-14
Payer: COMMERCIAL

## 2023-09-11 PROCEDURE — 97530 THERAPEUTIC ACTIVITIES: CPT

## 2023-09-11 PROCEDURE — 97112 NEUROMUSCULAR REEDUCATION: CPT

## 2023-09-11 PROCEDURE — 97110 THERAPEUTIC EXERCISES: CPT

## 2023-09-11 NOTE — PROGRESS NOTES
community integration to address functional deficits and attain remaining goals. Progress toward goals / Updated goals:  []  See Progress Note/Recertification  New Goals to be achieved in __4__ weeks  1. Pt will increase FOTO score to 81 points to improve ability to perform ADLs. - not reassessed  Status at last note/certification: 67 points     2. Pt will increase AROM l/s EXT/B SB/right rotation to WNL with minimal to no increased pain to improve ability to perform work activities. - pt demonstrating improving trunk AROM in forward folds, hip and lumbar stretching exercise  3. Pt will report having minimal to no increased pain in the mornings to improve tolerance to daily and household mobility.- Partially met- on radicular sx since last week.       PLAN  - Continue Plan of [address] [place] [address]    9/11/2023    6:53 PM    Future Appointments   Date Time Provider [address]   9/13/2023  5:40 PM Terry Evans [facility]   9/18/2023  5:40 PM Terry Evans [facility]   9/20/2023  5:40 PM Terry Evans [facility]   9/25/2023  5:40 PM Terry Evans PTA [facility]   9/27/2023  5:40 PM [facility]

## 2023-09-13 ENCOUNTER — HOSPITAL ENCOUNTER (OUTPATIENT)
Facility: HOSPITAL | Age: 58
Setting detail: RECURRING SERIES
Discharge: HOME | End: 2023-09-16
Payer: COMMERCIAL

## 2023-09-13 PROCEDURE — 97112 NEUROMUSCULAR REEDUCATION: CPT

## 2023-09-13 PROCEDURE — 97530 THERAPEUTIC ACTIVITIES: CPT

## 2023-09-13 PROCEDURE — 97110 THERAPEUTIC EXERCISES: CPT

## 2023-09-13 NOTE — PROGRESS NOTES
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 No radicular sx noted. TREATMENT AREA =  Other low back pain [M54.59]    OBJECTIVE    Modalities Rationale:     decrease edema, decrease inflammation, decrease pain, and increase tissue extensibility to improve patient's ability to progress to PLOF and address remaining functional goals. min [] Estim Unattended, type/location:                                      []  w/ice    []  w/heat    min [] Estim Attended, type/location:                                     []  w/US     []  w/ice    []  w/heat    []  TENS insruct      min []  Mechanical Traction: type/lbs                   []  pro   []  sup   []  int   []  cont    []  before manual    []  after manual    min []  Ultrasound, settings/location:     10 min  unbill []  Ice     [x]  Heat    location/position: Supine low back     min []  Paraffin,  details:     min []  Vasopneumatic Device, press/temp:     min []  Gwyndolyn Brainerd / Mesfin Beau: If using vaso (only need to measure limb vaso being performed on)      pre-treatment girth :       post-treatment girth :       measured at (landmark location) :      min []  Other:    Skin assessment post-treatment:   Intact      Therapeutic Procedures: Tx Min Billable or 1:1 Min (if diff from Tx Min) Procedure, Rationale, Specifics   [phone] [id number] Therapeutic Exercise (timed):  increase ROM, strength, coordination, balance, and proprioception to improve patient's ability to progress to PLOF and address remaining functional goals.  (see flow sheet as applicable)     Details if

## 2023-09-18 ENCOUNTER — HOSPITAL ENCOUNTER (OUTPATIENT)
Facility: HOSPITAL | Age: 58
Setting detail: RECURRING SERIES
Discharge: HOME | End: 2023-09-21
Payer: COMMERCIAL

## 2023-09-18 PROCEDURE — 97110 THERAPEUTIC EXERCISES: CPT

## 2023-09-18 PROCEDURE — 97112 NEUROMUSCULAR REEDUCATION: CPT

## 2023-09-18 PROCEDURE — 97530 THERAPEUTIC ACTIVITIES: CPT

## 2023-09-18 NOTE — PROGRESS NOTES
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 min [] Estim Unattended, type/location:                                      []  w/ice    []  w/heat    min [] Estim Attended, type/location:                                     []  w/US     []  w/ice    []  w/heat    []  TENS insruct      min []  Mechanical Traction: type/lbs                   []  pro   []  sup   []  int   []  cont    []  before manual    []  after manual    min []  Ultrasound, settings/location:     10 min  unbill []  Ice     [x]  Heat    location/position: Supine Low back    min []  Paraffin,  details:     min []  Vasopneumatic Device, press/temp:     min []  Charlann Dues / Exie De León: If using vaso (only need to measure limb vaso being performed on)      pre-treatment girth :       post-treatment girth :       measured at (landmark location) :      min []  Other:    Skin assessment post-treatment:   Intact      Therapeutic Procedures: Tx Min Billable or 1:1 Min (if diff from Tx Min) Procedure, Rationale, Specifics   33  [id number] Therapeutic Exercise (timed):  increase ROM, strength, coordination, balance, and proprioception to improve patient's ability to progress to PLOF and address remaining functional goals.  (see flow sheet as

## 2023-09-20 ENCOUNTER — HOSPITAL ENCOUNTER (OUTPATIENT)
Facility: HOSPITAL | Age: 58
Setting detail: RECURRING SERIES
Discharge: HOME | End: 2023-09-23
Payer: COMMERCIAL

## 2023-09-20 PROCEDURE — 97112 NEUROMUSCULAR REEDUCATION: CPT

## 2023-09-20 PROCEDURE — 97110 THERAPEUTIC EXERCISES: CPT

## 2023-09-20 PROCEDURE — 97535 SELF CARE MNGMENT TRAINING: CPT

## 2023-09-20 NOTE — PROGRESS NOTES
PHYSICAL / OCCUPATIONAL THERAPY - DAILY TREATMENT NOTE (updated )    Patient Name: Gloria Araujo    Date: 2023    : 1965  Insurance: Payor: Alcides Cali / Plan: Leanna Dickson / Product Type: *No Product type* /      Patient  verified Yes     Visit #   Current / Total (21) 5 8   Time   In / Out 5:42 pm 6:40 pm   Pain   In / Out 2 1   Subjective Functional Status/Changes: Pt reports ~ 80 % overall improvement  Pain range: 1-2/10 with worst pain/stiffness in am      TREATMENT AREA =  Other low back pain [M54.59]    OBJECTIVE    Modalities Rationale:     decrease edema, decrease inflammation, decrease pain, and increase tissue extensibility to improve patient's ability to progress to PLOF and address remaining functional goals. min [] Estim Unattended, type/location:                                      []  w/ice    []  w/heat    min [] Estim Attended, type/location:                                     []  w/US     []  w/ice    []  w/heat    []  TENS insruct      min []  Mechanical Traction: type/lbs                   []  pro   []  sup   []  int   []  cont    []  before manual    []  after manual    min []  Ultrasound, settings/location:     10 min  unbill []  Ice     [x]  Heat    location/position: Supine MH Low back    min []  Paraffin,  details:     min []  Vasopneumatic Device, press/temp:     min []  Lannis [place] / Ivana Vallejo: If using vaso (only need to measure limb vaso being performed on)      pre-treatment girth :       post-treatment girth :       measured at (landmark location) :      min []  Other:    Skin assessment post-treatment:   Intact      Therapeutic Procedures: Tx Min Billable or 1:1 Min (if diff from Tx Min) Procedure, Rationale, Specifics   26 [phone] [id number] Therapeutic Exercise (timed):  increase ROM, strength, coordination, balance, and proprioception to improve patient's ability to progress to PLOF and address remaining functional goals.  (see flow sheet as applicable)

## 2023-09-25 ENCOUNTER — HOSPITAL ENCOUNTER (OUTPATIENT)
Facility: HOSPITAL | Age: 58
Setting detail: RECURRING SERIES
Discharge: HOME | End: 2023-09-28
Payer: COMMERCIAL

## 2023-09-25 PROCEDURE — 97530 THERAPEUTIC ACTIVITIES: CPT

## 2023-09-25 PROCEDURE — 97112 NEUROMUSCULAR REEDUCATION: CPT

## 2023-09-25 PROCEDURE — 97110 THERAPEUTIC EXERCISES: CPT

## 2023-09-26 ENCOUNTER — TELEPHONE (OUTPATIENT)
Dept: FAMILY MEDICINE CLINIC | Facility: CLINIC | Age: 58
End: 2023-09-26

## 2023-09-26 DIAGNOSIS — E55.9 VITAMIN D DEFICIENCY: ICD-10-CM

## 2023-09-26 DIAGNOSIS — E11.9 TYPE 2 DIABETES MELLITUS WITHOUT COMPLICATION, WITHOUT LONG-TERM CURRENT USE OF INSULIN (HCC): ICD-10-CM

## 2023-09-26 DIAGNOSIS — I10 PRIMARY HYPERTENSION: ICD-10-CM

## 2023-09-26 DIAGNOSIS — E78.2 MIXED HYPERLIPIDEMIA: ICD-10-CM

## 2023-09-26 DIAGNOSIS — Z00.00 ROUTINE HEALTH MAINTENANCE: Primary | ICD-10-CM

## 2023-09-26 NOTE — TELEPHONE ENCOUNTER
----- Message from Sol Hodgkins, 2300 to be sent at 9/26/2023  2:44 PM EDT -----  Subject: Referral Request    Reason for referral request? Patient would like to have lab orders to   complete prior to his visit on 10/09/2023. Please call patient when orders   are in the system. Provider patient wants to be referred to(if known):     Provider Phone Number(if known):     Additional Information for Provider?   ---------------------------------------------------------------------------  --------------  [address]    [phone]; OK to leave message on voicemail  ---------------------------------------------------------------------------  --------------

## 2023-09-27 ENCOUNTER — HOSPITAL ENCOUNTER (OUTPATIENT)
Facility: HOSPITAL | Age: 58
Setting detail: RECURRING SERIES
Discharge: HOME | End: 2023-09-30
Payer: COMMERCIAL

## 2023-09-27 PROCEDURE — 97110 THERAPEUTIC EXERCISES: CPT

## 2023-09-27 PROCEDURE — 97112 NEUROMUSCULAR REEDUCATION: CPT

## 2023-09-27 PROCEDURE — 97535 SELF CARE MNGMENT TRAINING: CPT

## 2023-09-27 NOTE — PROGRESS NOTES
[facility] PHYSICAL THERAPY  [address] [facility], [address], [place], [address] Ph: [phone] Fx: [phone]  DISCHARGE SUMMARY  Patient Name: Riana Guillermo : 1965   Treatment/Medical Diagnosis: Other low back pain [M54.59]   Referral Source: Priti Peraza MD     Date of Initial Visit: *** Attended Visits: 24 Missed Visits: ***     SUMMARY OF TREATMENT  ***    CURRENT STATUS  ***    ***  Status at last Eval: ***  Current Status: ***  Goal Met? {Yes/No-Ex:374538}    2. ***  Status at last Eval: ***  Current Status: ***  Goal Met? {Yes/No-Ex:920392}    3. ***  Status at last Eval: ***  Current Status: ***  Goal Met? {Yes/No-Ex:381046}      RECOMMENDATIONS  Discontinue therapy. Progressing towards or have reached established goals. If you have any questions/comments please contact us directly at [phone]. Thank you for allowing us to assist in the care of your patient.     Bernadine Contreras, PAM       2023       6:38 PM    Not Medicaid Ins, no signature required for DC 126/82   09/24/19 (!) 160/95            (goal 120/80)    Past Medical History:   Diagnosis Date    Hyperlipidemia     Hypertension     Rectal polyp 09/2019    Dr. Jayna Jarvis polyp    SCC (squamous cell carcinoma) 08/2011    R Tonsil      Past Surgical History:   Procedure Laterality Date    COLONOSCOPY  01/2009    Dr Tierra Martinez Normal    COLONOSCOPY  09/16/2019    EXCISION OF AURAL MASS      TONSILLECTOMY Right 08/10/2011    R Tonsil Dr Aida Cain     Family History   Problem Relation Age of Onset    Osteoporosis Mother     Coronary Art Dis Father     Heart Disease Father     Heart Attack Father      Social History     Tobacco Use    Smoking status: Never Smoker    Smokeless tobacco: Never Used   Substance Use Topics    Alcohol use: Yes        Current Outpatient Medications   Medication Sig Dispense Refill    ibuprofen (ADVIL;MOTRIN) 200 MG tablet Up to 4 tablets 3 times / day 120 tablet 3    cyclobenzaprine (FLEXERIL) 10 MG tablet Take 1 tablet by mouth 3 times daily as needed for Muscle spasms 30 tablet 0    Camphor-Menthol-Methyl Sal (HM SALONPAS PAIN RELIEF) 1.2-5.7-6.3 % PTCH Apply to affected area daily 10 patch 0    amLODIPine (NORVASC) 10 MG tablet TAKE 1 TABLET NIGHTLY 90 tablet 3    metoprolol succinate (TOPROL XL) 50 MG extended release tablet TAKE 1 TABLET ONCE DAILY 90 tablet 3     No current facility-administered medications for this visit.       Allergies   Allergen Reactions    No Known Allergies        Health Maintenance   Topic Date Due    HIV screen  10/30/1973    Shingles Vaccine (2 of 3) 01/07/2014    Potassium monitoring  08/06/2020    Creatinine monitoring  08/06/2020    A1C test (Diabetic or Prediabetic)  02/13/2021    Lipid screen  08/06/2024    DTaP/Tdap/Td vaccine (2 - Td) 08/12/2029    Colon cancer screen colonoscopy  09/17/2029    Flu vaccine  Completed    Hepatitis C screen  Completed    Hepatitis A vaccine  Aged Out    Hepatitis B vaccine  Aged Out    Hib side and 1+ on the left side. Comments: Straight leg raising causes some ipsilateral low back pain. Assessment:      Diagnosis Orders   1. Acute right-sided low back pain without sciatica              POC Testing Results (If Applicable):  No results found for this visit on 04/07/20. Plan:     He will apply over-the-counter salon pass. Continue ibuprofen. Maximum doses added to his after visit summary. Flexeril 3 times daily as needed  Follow-up with chiropractor as needed. Continue home exercises. Return the office as needed and as scheduled. Sooner if any problems    Orders Given:  No orders of the defined types were placed in this encounter. Prescriptions:    Orders Placed This Encounter   Medications    ibuprofen (ADVIL;MOTRIN) 200 MG tablet     Sig: Up to 4 tablets 3 times / day     Dispense:  120 tablet     Refill:  3    cyclobenzaprine (FLEXERIL) 10 MG tablet     Sig: Take 1 tablet by mouth 3 times daily as needed for Muscle spasms     Dispense:  30 tablet     Refill:  0    Camphor-Menthol-Methyl Sal (HM SALONPAS PAIN RELIEF) 1.2-5.7-6.3 % PTCH     Sig: Apply to affected area daily     Dispense:  10 patch     Refill:  0        Return if symptoms worsen or fail to improve. Electronically signed by Yung Mcclain MD on4/7/2020. **This report has been created using voice recognition software. It may contain minor errors which are inherent in voice recognition technology. **

## 2023-09-27 NOTE — PROGRESS NOTES
PHYSICAL / OCCUPATIONAL THERAPY - DAILY TREATMENT NOTE (updated )    Patient Name: Alen Lombardi    Date: 2023    : 1965  Insurance: Payor: Juan F Pearce / Plan: Eliza Guallpa / Product Type: *No Product type* /      Patient  verified Yes     Visit #   Current / Total 24 24   Time   In / Out 5:46 6:49 pm   Pain   In / Out 1 ***   Subjective Functional Status/Changes: Pt reports no radicular sx 3-4 weeks. 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 min [] Estim Unattended, type/location:                                      []  w/ice    []  w/heat    min [] Estim Attended, type/location:                                     []  w/US     []  w/ice    []  w/heat    []  TENS insruct      min []  Mechanical Traction: type/lbs                   []  pro   []  sup   []  int   []  cont    []  before manual    []  after manual    min []  Ultrasound, settings/location:     10 min  unbill []  Ice     [x]  Heat    location/position: Supine low back    min []  Paraffin,  details:     min []  Vasopneumatic Device, press/temp:     min []  Irene Umana / Miky Clarinda: If using vaso (only need to measure limb vaso being performed on)      pre-treatment girth :       post-treatment girth :       measured at (landmark location) :      min []  Other:    Skin assessment post-treatment:   Intact      Therapeutic Procedures: Tx Min Billable or 1:1 Min (if diff from Tx Min) Procedure, Rationale, Specifics   25  [id number] Therapeutic Exercise (timed):  increase ROM, strength, coordination, balance, and proprioception to improve patient's ability to progress to PLOF and address remaining functional goals.  (see flow sheet as applicable)     Details if applicable:

## 2023-10-04 ENCOUNTER — HOSPITAL ENCOUNTER (OUTPATIENT)
Facility: HOSPITAL | Age: 58
Setting detail: SPECIMEN
Discharge: HOME | End: 2023-10-07
Payer: COMMERCIAL

## 2023-10-04 DIAGNOSIS — E78.2 MIXED HYPERLIPIDEMIA: ICD-10-CM

## 2023-10-04 DIAGNOSIS — E11.9 TYPE 2 DIABETES MELLITUS WITHOUT COMPLICATION, WITHOUT LONG-TERM CURRENT USE OF INSULIN (HCC): ICD-10-CM

## 2023-10-04 DIAGNOSIS — E55.9 VITAMIN D DEFICIENCY: ICD-10-CM

## 2023-10-04 DIAGNOSIS — I10 PRIMARY HYPERTENSION: ICD-10-CM

## 2023-10-04 DIAGNOSIS — Z00.00 ROUTINE HEALTH MAINTENANCE: ICD-10-CM

## 2023-10-04 LAB
25(OH)D3 SERPL-MCNC: 51.5 NG/ML (ref 30–100)
ALBUMIN SERPL-MCNC: 4.1 G/DL (ref 3.4–5)
ALBUMIN/GLOB SERPL: 1.4 (ref 0.8–1.7)
ALP SERPL-CCNC: 66 U/L (ref 45–117)
ALT SERPL-CCNC: 39 U/L (ref 16–61)
AMORPH CRY URNS QL MICRO: ABNORMAL
ANION GAP SERPL CALC-SCNC: 4 MMOL/L (ref 3–18)
APPEARANCE UR: ABNORMAL
AST SERPL-CCNC: 18 U/L (ref 10–38)
BACTERIA URNS QL MICRO: NEGATIVE /HPF
BASOPHILS # BLD: 0 K/UL (ref 0–0.1)
BASOPHILS NFR BLD: 0 % (ref 0–2)
BILIRUB SERPL-MCNC: 0.9 MG/DL (ref 0.2–1)
BILIRUB UR QL: NEGATIVE
BUN SERPL-MCNC: 20 MG/DL (ref 7–18)
BUN/CREAT SERPL: 19 (ref 12–20)
CALCIUM SERPL-MCNC: 9.1 MG/DL (ref 8.5–10.1)
CHLORIDE SERPL-SCNC: 105 MMOL/L (ref 100–111)
CHOLEST SERPL-MCNC: 126 MG/DL
CO2 SERPL-SCNC: 30 MMOL/L (ref 21–32)
COLOR UR: YELLOW
CREAT SERPL-MCNC: 1.03 MG/DL (ref 0.6–1.3)
CREAT UR-MCNC: 384 MG/DL (ref 30–125)
DIFFERENTIAL METHOD BLD: ABNORMAL
EOSINOPHIL # BLD: 0.1 K/UL (ref 0–0.4)
EOSINOPHIL NFR BLD: 2 % (ref 0–5)
EPITH CASTS URNS QL MICRO: NEGATIVE /LPF (ref 0–5)
ERYTHROCYTE [DISTWIDTH] IN BLOOD BY AUTOMATED COUNT: 13.6 % (ref 11.6–14.5)
EST. AVERAGE GLUCOSE BLD GHB EST-MCNC: 137 MG/DL
GLOBULIN SER CALC-MCNC: 3 G/DL (ref 2–4)
GLUCOSE SERPL-MCNC: 138 MG/DL (ref 74–99)
GLUCOSE UR STRIP.AUTO-MCNC: NEGATIVE MG/DL
HBA1C MFR BLD: 6.4 % (ref 4.2–5.6)
HCT VFR BLD AUTO: 42.1 % (ref 36–48)
HDLC SERPL-MCNC: 43 MG/DL (ref 40–60)
HDLC SERPL: 2.9 (ref 0–5)
HGB BLD-MCNC: 13.8 G/DL (ref 13–16)
HGB UR QL STRIP: NEGATIVE
HIV 1+2 AB+HIV1 P24 AG SERPL QL IA: NONREACTIVE
HIV 1/2 RESULT COMMENT: NORMAL
IMM GRANULOCYTES # BLD AUTO: 0 K/UL (ref 0–0.04)
IMM GRANULOCYTES NFR BLD AUTO: 0 % (ref 0–0.5)
KETONES UR QL STRIP.AUTO: NEGATIVE MG/DL
LDLC SERPL CALC-MCNC: 65 MG/DL (ref 0–100)
LEUKOCYTE ESTERASE UR QL STRIP.AUTO: NEGATIVE
LIPID PANEL: NORMAL
LYMPHOCYTES # BLD: 2.2 K/UL (ref 0.9–3.6)
LYMPHOCYTES NFR BLD: 48 % (ref 21–52)
MCH RBC QN AUTO: 27.1 PG (ref 24–34)
MCHC RBC AUTO-ENTMCNC: 32.8 G/DL (ref 31–37)
MCV RBC AUTO: 82.5 FL (ref 78–100)
MICROALBUMIN UR-MCNC: 4.26 MG/DL (ref 0–3)
MICROALBUMIN/CREAT UR-RTO: 11 MG/G (ref 0–30)
MONOCYTES # BLD: 0.4 K/UL (ref 0.05–1.2)
MONOCYTES NFR BLD: 8 % (ref 3–10)
NEUTS SEG # BLD: 1.9 K/UL (ref 1.8–8)
NEUTS SEG NFR BLD: 42 % (ref 40–73)
NITRITE UR QL STRIP.AUTO: NEGATIVE
NRBC # BLD: 0 K/UL (ref 0–0.01)
NRBC BLD-RTO: 0 PER 100 WBC
PH UR STRIP: 5.5 (ref 5–8)
PLATELET # BLD AUTO: 230 K/UL (ref 135–420)
PMV BLD AUTO: 11.9 FL (ref 9.2–11.8)
POTASSIUM SERPL-SCNC: 3.9 MMOL/L (ref 3.5–5.5)
PROT SERPL-MCNC: 7.1 G/DL (ref 6.4–8.2)
PROT UR STRIP-MCNC: ABNORMAL MG/DL
PSA SERPL-MCNC: 1.4 NG/ML (ref 0–4)
RBC # BLD AUTO: 5.1 M/UL (ref 4.35–5.65)
RBC #/AREA URNS HPF: NEGATIVE /HPF (ref 0–5)
SODIUM SERPL-SCNC: 139 MMOL/L (ref 136–145)
SP GR UR REFRACTOMETRY: >1.03 (ref 1–1.03)
TRIGL SERPL-MCNC: 90 MG/DL
TSH SERPL DL<=0.05 MIU/L-ACNC: 1.08 UIU/ML (ref 0.36–3.74)
UROBILINOGEN UR QL STRIP.AUTO: 0.2 EU/DL (ref 0.2–1)
VLDLC SERPL CALC-MCNC: 18 MG/DL
WBC # BLD AUTO: 4.7 K/UL (ref 4.6–13.2)
WBC URNS QL MICRO: NEGATIVE /HPF (ref 0–4)

## 2023-10-04 PROCEDURE — 85025 COMPLETE CBC W/AUTO DIFF WBC: CPT

## 2023-10-04 PROCEDURE — 84443 ASSAY THYROID STIM HORMONE: CPT

## 2023-10-04 PROCEDURE — 36415 COLL VENOUS BLD VENIPUNCTURE: CPT

## 2023-10-04 PROCEDURE — G0103 PSA SCREENING: HCPCS

## 2023-10-04 PROCEDURE — 83036 HEMOGLOBIN GLYCOSYLATED A1C: CPT

## 2023-10-04 PROCEDURE — 81001 URINALYSIS AUTO W/SCOPE: CPT

## 2023-10-04 PROCEDURE — 82570 ASSAY OF URINE CREATININE: CPT

## 2023-10-04 PROCEDURE — 80053 COMPREHEN METABOLIC PANEL: CPT

## 2023-10-04 PROCEDURE — 87389 HIV-1 AG W/HIV-1&-2 AB AG IA: CPT

## 2023-10-04 PROCEDURE — 80061 LIPID PANEL: CPT

## 2023-10-04 PROCEDURE — 82306 VITAMIN D 25 HYDROXY: CPT

## 2023-10-04 PROCEDURE — 82043 UR ALBUMIN QUANTITATIVE: CPT

## 2023-10-07 PROBLEM — Z86.010 HX OF ADENOMATOUS POLYP OF COLON: Status: ACTIVE | Noted: 2023-10-07

## 2023-10-07 PROBLEM — Z86.0101 HX OF ADENOMATOUS POLYP OF COLON: Status: ACTIVE | Noted: 2023-10-07

## 2023-10-07 PROBLEM — M48.061 LUMBAR FORAMINAL STENOSIS: Status: ACTIVE | Noted: 2023-10-07

## 2023-10-07 ASSESSMENT — ENCOUNTER SYMPTOMS
CONSTIPATION: 0
SHORTNESS OF BREATH: 0
NAUSEA: 0
VOMITING: 0
ABDOMINAL PAIN: 0
CHEST TIGHTNESS: 0
COUGH: 0
DIARRHEA: 0
WHEEZING: 0
BLOOD IN STOOL: 0
ANAL BLEEDING: 0
SORE THROAT: 0

## 2023-10-07 NOTE — PROGRESS NOTES
HISTORY OF PRESENT ILLNESS  Riana Guillermo  is a 61 y.o. y.o. male     He presents for health assessment, preventative care and follow-up with a history of type 2 diabetes, hypertension,  hyperlipidemia, vitamin D deficiency, adenomatous polyp of the colon and a family history of carcinoma the colon. History also includes lumbar disc disease with foraminal stenosis followed by interventional pain management.         Mr#: [id number]      Past Medical History:   Diagnosis Date    Diabetes ([address]) 2006    Hypercholesterolemia     Hypertension 2006    Sleep apnea        Past Surgical History:   Procedure Laterality Date    COLONOSCOPY  04/26/2022    Tubular adenoma, 5-year follow-up, Dr. Arora May    COLONOSCOPY N/A 3/31/2017    COLONOSCOPY w hot polypectomy performed by Bruce Nuñez MD at [address]  2/2014       Family History   Problem Relation Age of Onset    Hypertension Neg Hx     Heart Disease Neg Hx     Cancer Paternal Grandfather         unknown type     Diabetes Maternal Grandfather     Cancer Maternal Grandfather         colon    Diabetes Maternal Uncle     Cancer Sister         rectal    Cancer Maternal Uncle         colon       Allergies   Allergen Reactions    Lisinopril Cough    Penicillins Rash and Hives       Social History     Tobacco Use   Smoking Status Former   Smokeless Tobacco Former    Quit date: 3/17/2015       Social History     Substance and Sexual Activity   Alcohol Use Yes       Immunization History   Administered Date(s) Administered    COVID-19, PFIZER PURPLE top, DILUTE for use, (age 15 y+), 30mcg/0.3mL 03/25/2021, 04/15/2021    Influenza, FLUARIX, FLULAVAL, FLUZONE (age 10 mo+) AND AFLURIA, (age 1 y+), PF, 0.5mL 01/12/2017, 11/19/2018, 10/06/2021, 11/08/2022    Pneumococcal, PPSV23, PNEUMOVAX 21, (age 2y+), SC/IM, 0.5mL 07/31/2018    Zoster Recombinant (Shingrix) 06/02/2021, 10/06/2021       Patient Active Problem List   Diagnosis    Mixed hyperlipidemia

## 2023-10-09 ENCOUNTER — OFFICE VISIT (OUTPATIENT)
Dept: FAMILY MEDICINE CLINIC | Facility: CLINIC | Age: 58
End: 2023-10-09
Payer: COMMERCIAL

## 2023-10-09 VITALS
HEIGHT: 68 IN | RESPIRATION RATE: 16 BRPM | WEIGHT: 187 LBS | SYSTOLIC BLOOD PRESSURE: 120 MMHG | HEART RATE: 95 BPM | OXYGEN SATURATION: 98 % | BODY MASS INDEX: 28.34 KG/M2 | DIASTOLIC BLOOD PRESSURE: 70 MMHG | TEMPERATURE: 98 F

## 2023-10-09 DIAGNOSIS — I10 PRIMARY HYPERTENSION: ICD-10-CM

## 2023-10-09 DIAGNOSIS — Z86.010 HX OF ADENOMATOUS POLYP OF COLON: ICD-10-CM

## 2023-10-09 DIAGNOSIS — Z00.00 ROUTINE GENERAL MEDICAL EXAMINATION AT A HEALTH CARE FACILITY: ICD-10-CM

## 2023-10-09 DIAGNOSIS — Z23 NEED FOR PROPHYLACTIC VACCINATION AND INOCULATION AGAINST INFLUENZA: ICD-10-CM

## 2023-10-09 DIAGNOSIS — M48.061 LUMBAR FORAMINAL STENOSIS: ICD-10-CM

## 2023-10-09 DIAGNOSIS — E11.9 TYPE 2 DIABETES MELLITUS WITHOUT COMPLICATION, WITHOUT LONG-TERM CURRENT USE OF INSULIN (HCC): Primary | ICD-10-CM

## 2023-10-09 DIAGNOSIS — E55.9 VITAMIN D DEFICIENCY: ICD-10-CM

## 2023-10-09 DIAGNOSIS — E78.2 MIXED HYPERLIPIDEMIA: ICD-10-CM

## 2023-10-09 DIAGNOSIS — Z80.0 FAMILY HISTORY OF COLON CANCER: ICD-10-CM

## 2023-10-09 PROCEDURE — 3078F DIAST BP <80 MM HG: CPT | Performed by: FAMILY MEDICINE

## 2023-10-09 PROCEDURE — 90677 PCV20 VACCINE IM: CPT | Performed by: FAMILY MEDICINE

## 2023-10-09 PROCEDURE — 90674 CCIIV4 VAC NO PRSV 0.5 ML IM: CPT | Performed by: FAMILY MEDICINE

## 2023-10-09 PROCEDURE — 90472 IMMUNIZATION ADMIN EACH ADD: CPT | Performed by: FAMILY MEDICINE

## 2023-10-09 PROCEDURE — 90471 IMMUNIZATION ADMIN: CPT | Performed by: FAMILY MEDICINE

## 2023-10-09 PROCEDURE — 3074F SYST BP LT 130 MM HG: CPT | Performed by: FAMILY MEDICINE

## 2023-10-09 PROCEDURE — 99396 PREV VISIT EST AGE 40-64: CPT | Performed by: FAMILY MEDICINE

## 2023-10-09 RX ORDER — CYCLOBENZAPRINE HCL 5 MG
5 TABLET ORAL 3 TIMES DAILY
COMMUNITY
Start: 2023-09-27

## 2023-10-09 SDOH — ECONOMIC STABILITY: FOOD INSECURITY: WITHIN THE PAST 12 MONTHS, YOU WORRIED THAT YOUR FOOD WOULD RUN OUT BEFORE YOU GOT MONEY TO BUY MORE.: NEVER TRUE

## 2023-10-09 SDOH — ECONOMIC STABILITY: INCOME INSECURITY: HOW HARD IS IT FOR YOU TO PAY FOR THE VERY BASICS LIKE FOOD, HOUSING, MEDICAL CARE, AND HEATING?: NOT HARD AT ALL

## 2023-10-09 SDOH — ECONOMIC STABILITY: FOOD INSECURITY: WITHIN THE PAST 12 MONTHS, THE FOOD YOU BOUGHT JUST DIDN'T LAST AND YOU DIDN'T HAVE MONEY TO GET MORE.: NEVER TRUE

## 2023-10-09 SDOH — ECONOMIC STABILITY: HOUSING INSECURITY
IN THE LAST 12 MONTHS, WAS THERE A TIME WHEN YOU DID NOT HAVE A STEADY PLACE TO SLEEP OR SLEPT IN A SHELTER (INCLUDING NOW)?: NO

## 2023-10-09 ASSESSMENT — ENCOUNTER SYMPTOMS: BACK PAIN: 1

## 2023-10-09 NOTE — PATIENT INSTRUCTIONS
Current Status:  Type 2 diabetes well controlled  Hypertension well-controlled  Satisfactory lipid levels  Satisfactory vitamin D level  High risk for carcinoma of the colon with colonoscopy surveillance up-to-date  Symptoms associated with lumbar foraminal stenosis generally improved, followed by interventional pain management-recent exacerbation after carrying a heavy backpack at work    Health Maintenance Recommendations:  Keep current with COVID-19 immunization guidelines  PCV-20 pneumococcal immunization-today  Tdap immunization-reported current  Hepatitis B immunization series when available  Influenza immunization given today  Diabetic eye exam reported as current, records requested    Plan:  Continue current medications  Avoid dietary salt, starch and sugar and as much as possible follow program of regular aerobic exercise. Return for follow-up with an in office hemoglobin A1c in 6 months, return sooner with any problems  Please always arrive at least 15 minutes before your scheduled appointment time.

## 2023-10-30 RX ORDER — ATORVASTATIN CALCIUM 80 MG/1
80 TABLET, FILM COATED ORAL DAILY
Qty: 100 TABLET | Refills: 3 | Status: SHIPPED | OUTPATIENT
Start: 2023-10-30

## 2023-10-30 NOTE — TELEPHONE ENCOUNTER
Last refilled 9/2/22 for 90 with 3 refills .  Last ov 10/9/23   Future Appointments   Date Time Provider [address]   4/9/2024  7:30 AM Jorge A Carey MD [facility] AMB

## 2024-01-24 ENCOUNTER — PATIENT MESSAGE (OUTPATIENT)
Dept: FAMILY MEDICINE CLINIC | Facility: CLINIC | Age: 59
End: 2024-01-24

## 2024-01-25 ENCOUNTER — TELEPHONE (OUTPATIENT)
Dept: FAMILY MEDICINE CLINIC | Facility: CLINIC | Age: 59
End: 2024-01-25

## 2024-01-25 NOTE — TELEPHONE ENCOUNTER
Spoke w/pt, pt has been icing the area, swelling is going down; pt scheduled on:   Future Appointments   Date Time Provider Department Center   1/26/2024  1:00 PM Tristan Osman MD [facility] AM[facility]   4/9/2024  7:30 AM Tristan Osman MD [facility] AMB     Advised pt if pain worsens, to go to ER for further eval and possible tx; pt voiced understanding.

## 2024-01-25 NOTE — TELEPHONE ENCOUNTER
From: Jose J Gillespie  To: Dr. Tristan Osman  Sent: 1/24/2024 4:28 PM EST  Subject: Swelling in the groin area and penis shaft    Had a small miss step on a step ladder on the 15th of this month. No major pain until this morning. Notice a lot of swelling on the left side of the penises around 1200 hours, went home and tried ice but still swollen. No answer at  when started to call about 1540. Should I wait until tomorrow morning to see urgent care?

## 2024-01-26 ENCOUNTER — OFFICE VISIT (OUTPATIENT)
Dept: FAMILY MEDICINE CLINIC | Facility: CLINIC | Age: 59
End: 2024-01-26
Payer: COMMERCIAL

## 2024-01-26 VITALS
HEART RATE: 111 BPM | HEIGHT: 68 IN | OXYGEN SATURATION: 98 % | BODY MASS INDEX: 28.04 KG/M2 | RESPIRATION RATE: 16 BRPM | TEMPERATURE: 97.8 F | DIASTOLIC BLOOD PRESSURE: 70 MMHG | WEIGHT: 185 LBS | SYSTOLIC BLOOD PRESSURE: 120 MMHG

## 2024-01-26 DIAGNOSIS — N48.81: Primary | ICD-10-CM

## 2024-01-26 PROCEDURE — 99214 OFFICE O/P EST MOD 30 MIN: CPT | Performed by: FAMILY MEDICINE

## 2024-01-26 PROCEDURE — 3078F DIAST BP <80 MM HG: CPT | Performed by: FAMILY MEDICINE

## 2024-01-26 PROCEDURE — 3074F SYST BP LT 130 MM HG: CPT | Performed by: FAMILY MEDICINE

## 2024-01-26 RX ORDER — LOSARTAN POTASSIUM AND HYDROCHLOROTHIAZIDE 12.5; 1 MG/1; MG/1
1 TABLET ORAL DAILY
Qty: 90 TABLET | Refills: 3 | Status: SHIPPED | OUTPATIENT
Start: 2024-01-26

## 2024-01-26 RX ORDER — ERGOCALCIFEROL 1.25 MG/1
1 CAPSULE ORAL
Qty: 12 CAPSULE | Refills: 3 | Status: SHIPPED | OUTPATIENT
Start: 2024-01-26

## 2024-01-26 ASSESSMENT — PATIENT HEALTH QUESTIONNAIRE - PHQ9
1. LITTLE INTEREST OR PLEASURE IN DOING THINGS: 0
2. FEELING DOWN, DEPRESSED OR HOPELESS: 0
SUM OF ALL RESPONSES TO PHQ QUESTIONS 1-9: 0
SUM OF ALL RESPONSES TO PHQ9 QUESTIONS 1 & 2: 0

## 2024-01-26 NOTE — PROGRESS NOTES
Jose J Gillespie is a 58 y.o. male (: 1965) presenting to address:    Chief Complaint   Patient presents with    Penis Injury     Fell On 1/15.        Vitals:    24 1310   BP: 120/70   Pulse: (!) 111   Resp: 16   Temp: 97.8 °F (36.6 °C)   SpO2: 98%       \"Have you been to the ER, urgent care clinic since your last visit?  Hospitalized since your last visit?\"    NO    “Have you seen or consulted any other health care providers outside of [facility] since your last visit?”    NO             
veins at the proximal volar aspect of the penile shaft, no scrotal abnormalities noted  Neurological:      Mental Status: He is alert.   Psychiatric:         Mood and Affect: Mood normal.         Behavior: Behavior normal.          ASSESSMENT and PLAN    1. Thrombosis of penile vein  -     Vascular duplex penile complete; Future      Current Status:  Penile edema apparently secondary to penile venous thrombosis    Health Maintenance Recommendations:  Keep current with COVID-19 immunization guidelines  Hepatitis B immunization series  Diabetic eye exam    Plan:  Recommend emergency department evaluation if ultrasound cannot be completed today  Penile venous ultrasound with further disposition pending results  Subsequently referred to the emergency department since it seems extremely unlikely that outpatient ultrasound can be obtained in time for meaningful results and intervention on a Friday afternoon.  Return for follow-up as scheduled in April or sooner with any problems  Please always arrive at least 15 minutes before your scheduled appointment time.      Tristan Osman MD    Please Note:  This document has been produced using voice recognition software.  Unrecognized errors in transcription may be present.

## 2024-01-26 NOTE — PATIENT INSTRUCTIONS
Current Status:  Penile edema apparently secondary to penile venous thrombosis    Health Maintenance Recommendations:  Keep current with COVID-19 immunization guidelines  Hepatitis B immunization series  Diabetic eye exam    Plan:  Recommend emergency department evaluation if ultrasound cannot be completed today  Penile venous ultrasound with further disposition pending results  Return for follow-up as scheduled in April or sooner with any problems  Please always arrive at least 15 minutes before your scheduled appointment time.

## 2024-04-08 ASSESSMENT — ENCOUNTER SYMPTOMS
SHORTNESS OF BREATH: 0
CHEST TIGHTNESS: 0

## 2024-04-09 ENCOUNTER — OFFICE VISIT (OUTPATIENT)
Dept: FAMILY MEDICINE CLINIC | Facility: CLINIC | Age: 59
End: 2024-04-09
Payer: COMMERCIAL

## 2024-04-09 VITALS
BODY MASS INDEX: 29.7 KG/M2 | OXYGEN SATURATION: 97 % | TEMPERATURE: 97.3 F | RESPIRATION RATE: 16 BRPM | HEART RATE: 100 BPM | WEIGHT: 196 LBS | SYSTOLIC BLOOD PRESSURE: 122 MMHG | DIASTOLIC BLOOD PRESSURE: 80 MMHG | HEIGHT: 68 IN

## 2024-04-09 DIAGNOSIS — Z86.010 HX OF ADENOMATOUS POLYP OF COLON: ICD-10-CM

## 2024-04-09 DIAGNOSIS — E55.9 VITAMIN D DEFICIENCY: ICD-10-CM

## 2024-04-09 DIAGNOSIS — I10 PRIMARY HYPERTENSION: ICD-10-CM

## 2024-04-09 DIAGNOSIS — E78.2 MIXED HYPERLIPIDEMIA: ICD-10-CM

## 2024-04-09 DIAGNOSIS — E11.9 TYPE 2 DIABETES MELLITUS WITHOUT COMPLICATION, WITHOUT LONG-TERM CURRENT USE OF INSULIN (HCC): Primary | ICD-10-CM

## 2024-04-09 LAB — HBA1C MFR BLD: 6.9 %

## 2024-04-09 PROCEDURE — 90471 IMMUNIZATION ADMIN: CPT | Performed by: FAMILY MEDICINE

## 2024-04-09 PROCEDURE — 99213 OFFICE O/P EST LOW 20 MIN: CPT | Performed by: FAMILY MEDICINE

## 2024-04-09 PROCEDURE — 90739 HEPB VACC 2/4 DOSE ADULT IM: CPT | Performed by: FAMILY MEDICINE

## 2024-04-09 PROCEDURE — 3079F DIAST BP 80-89 MM HG: CPT | Performed by: FAMILY MEDICINE

## 2024-04-09 PROCEDURE — 83036 HEMOGLOBIN GLYCOSYLATED A1C: CPT | Performed by: FAMILY MEDICINE

## 2024-04-09 PROCEDURE — 3074F SYST BP LT 130 MM HG: CPT | Performed by: FAMILY MEDICINE

## 2024-04-09 NOTE — PROGRESS NOTES
Jose J Gillespie is a 58 y.o. male (: 1965) presenting to address:    Chief Complaint   Patient presents with    Diabetes    Hypertension       Vitals:    24 0735   BP: 122/80   Pulse: 100   Resp: 16   Temp: 97.3 °F (36.3 °C)   SpO2: 97%       \"Have you been to the ER, urgent care clinic since your last visit?  Hospitalized since your last visit?\"    Yes     “Have you seen or consulted any other health care providers outside of [facility] since your last visit?”    NO     Hep B #1 Immunization/s administered 2024 by Betty Gotti LPN   Patient tolerated procedure well.  No reactions noted.            
time.      Tristan Osman MD    Please Note:  This document has been produced using voice recognition software.  Unrecognized errors in transcription may be present.

## 2024-04-09 NOTE — PATIENT INSTRUCTIONS
Current Status:  Type 2 diabetes well-controlled with a hemoglobin A1c of 6.9% up from 6.4% 6 months ago  Hypertension well-controlled  Lipid levels and vitamin D level satisfactory as of annual check in October 2023  Colonoscopy surveillance up-to-date    Health Maintenance Recommendations:  Hepatitis B immunization series #1 today, #2 at annual physical exam in 6 months    Plan:  Continue current medications  Avoid dietary salt, starch and sugar and as much as possible follow program of regular aerobic exercise.  Please schedule a fasting lab appointment followed by an annual physical exam appointment after 10/9/2024, return sooner with any problems  Please always arrive at least 15 minutes before your scheduled appointment time.

## 2024-10-13 DIAGNOSIS — Z00.00 ROUTINE HEALTH MAINTENANCE: Primary | ICD-10-CM

## 2024-10-13 DIAGNOSIS — E11.9 TYPE 2 DIABETES MELLITUS WITHOUT COMPLICATION, WITHOUT LONG-TERM CURRENT USE OF INSULIN (HCC): ICD-10-CM

## 2024-10-13 DIAGNOSIS — E78.2 MIXED HYPERLIPIDEMIA: ICD-10-CM

## 2024-10-13 DIAGNOSIS — E55.9 VITAMIN D DEFICIENCY: ICD-10-CM

## 2024-10-13 DIAGNOSIS — I10 PRIMARY HYPERTENSION: ICD-10-CM

## 2024-10-24 ENCOUNTER — HOSPITAL ENCOUNTER (OUTPATIENT)
Facility: HOSPITAL | Age: 59
Setting detail: SPECIMEN
Discharge: HOME | End: 2024-10-27
Payer: COMMERCIAL

## 2024-10-24 DIAGNOSIS — E11.9 TYPE 2 DIABETES MELLITUS WITHOUT COMPLICATION, WITHOUT LONG-TERM CURRENT USE OF INSULIN (HCC): ICD-10-CM

## 2024-10-24 DIAGNOSIS — Z00.00 ROUTINE HEALTH MAINTENANCE: ICD-10-CM

## 2024-10-24 DIAGNOSIS — E78.2 MIXED HYPERLIPIDEMIA: ICD-10-CM

## 2024-10-24 DIAGNOSIS — E55.9 VITAMIN D DEFICIENCY: ICD-10-CM

## 2024-10-24 DIAGNOSIS — I10 PRIMARY HYPERTENSION: ICD-10-CM

## 2024-10-24 LAB
25(OH)D3 SERPL-MCNC: 78.2 NG/ML (ref 30–100)
ALBUMIN SERPL-MCNC: 3.7 G/DL (ref 3.4–5)
ALBUMIN/GLOB SERPL: 1.2 (ref 0.8–1.7)
ALP SERPL-CCNC: 65 U/L (ref 45–117)
ALT SERPL-CCNC: 50 U/L (ref 16–61)
ANION GAP SERPL CALC-SCNC: 8 MMOL/L (ref 3–18)
APPEARANCE UR: CLEAR
AST SERPL-CCNC: 21 U/L (ref 10–38)
BACTERIA URNS QL MICRO: ABNORMAL /HPF
BASOPHILS # BLD: 0 K/UL (ref 0–0.1)
BASOPHILS NFR BLD: 1 % (ref 0–2)
BILIRUB SERPL-MCNC: 0.9 MG/DL (ref 0.2–1)
BILIRUB UR QL: NEGATIVE
BUN SERPL-MCNC: 16 MG/DL (ref 7–18)
BUN/CREAT SERPL: 14 (ref 12–20)
CALCIUM SERPL-MCNC: 9.3 MG/DL (ref 8.5–10.1)
CHLORIDE SERPL-SCNC: 105 MMOL/L (ref 100–111)
CHOLEST SERPL-MCNC: 167 MG/DL
CO2 SERPL-SCNC: 28 MMOL/L (ref 21–32)
COLOR UR: YELLOW
CREAT SERPL-MCNC: 1.13 MG/DL (ref 0.6–1.3)
CREAT UR-MCNC: 292 MG/DL (ref 30–125)
DIFFERENTIAL METHOD BLD: ABNORMAL
EOSINOPHIL # BLD: 0.1 K/UL (ref 0–0.4)
EOSINOPHIL NFR BLD: 2 % (ref 0–5)
EPITH CASTS URNS QL MICRO: NEGATIVE /LPF (ref 0–5)
ERYTHROCYTE [DISTWIDTH] IN BLOOD BY AUTOMATED COUNT: 13.8 % (ref 11.6–14.5)
EST. AVERAGE GLUCOSE BLD GHB EST-MCNC: 148 MG/DL
GLOBULIN SER CALC-MCNC: 3.1 G/DL (ref 2–4)
GLUCOSE SERPL-MCNC: 133 MG/DL (ref 74–99)
GLUCOSE UR STRIP.AUTO-MCNC: NEGATIVE MG/DL
HBA1C MFR BLD: 6.8 % (ref 4.2–5.6)
HCT VFR BLD AUTO: 43.2 % (ref 36–48)
HDLC SERPL-MCNC: 48 MG/DL (ref 40–60)
HDLC SERPL: 3.5 (ref 0–5)
HGB BLD-MCNC: 13.8 G/DL (ref 13–16)
HGB UR QL STRIP: NEGATIVE
IMM GRANULOCYTES # BLD AUTO: 0 K/UL (ref 0–0.04)
IMM GRANULOCYTES NFR BLD AUTO: 0 % (ref 0–0.5)
KETONES UR QL STRIP.AUTO: NEGATIVE MG/DL
LDLC SERPL CALC-MCNC: 96.8 MG/DL (ref 0–100)
LEUKOCYTE ESTERASE UR QL STRIP.AUTO: NEGATIVE
LIPID PANEL: NORMAL
LYMPHOCYTES # BLD: 3 K/UL (ref 0.9–3.6)
LYMPHOCYTES NFR BLD: 52 % (ref 21–52)
MCH RBC QN AUTO: 27.1 PG (ref 24–34)
MCHC RBC AUTO-ENTMCNC: 31.9 G/DL (ref 31–37)
MCV RBC AUTO: 84.7 FL (ref 78–100)
MICROALBUMIN UR-MCNC: 1.25 MG/DL (ref 0–3)
MICROALBUMIN/CREAT UR-RTO: 4 MG/G (ref 0–30)
MONOCYTES # BLD: 0.4 K/UL (ref 0.05–1.2)
MONOCYTES NFR BLD: 7 % (ref 3–10)
MUCOUS THREADS URNS QL MICRO: ABNORMAL /LPF
NEUTS SEG # BLD: 2.2 K/UL (ref 1.8–8)
NEUTS SEG NFR BLD: 38 % (ref 40–73)
NITRITE UR QL STRIP.AUTO: NEGATIVE
NRBC # BLD: 0 K/UL (ref 0–0.01)
NRBC BLD-RTO: 0 PER 100 WBC
PH UR STRIP: 5.5 (ref 5–8)
PLATELET # BLD AUTO: 232 K/UL (ref 135–420)
PMV BLD AUTO: 12.3 FL (ref 9.2–11.8)
POTASSIUM SERPL-SCNC: 4.5 MMOL/L (ref 3.5–5.5)
PROT SERPL-MCNC: 6.8 G/DL (ref 6.4–8.2)
PROT UR STRIP-MCNC: ABNORMAL MG/DL
PSA SERPL-MCNC: 1.7 NG/ML (ref 0–4)
RBC # BLD AUTO: 5.1 M/UL (ref 4.35–5.65)
RBC #/AREA URNS HPF: NEGATIVE /HPF (ref 0–5)
SODIUM SERPL-SCNC: 141 MMOL/L (ref 136–145)
SP GR UR REFRACTOMETRY: 1.02 (ref 1–1.03)
TRIGL SERPL-MCNC: 111 MG/DL
TSH SERPL DL<=0.05 MIU/L-ACNC: 1.3 UIU/ML (ref 0.36–3.74)
UROBILINOGEN UR QL STRIP.AUTO: 0.2 EU/DL (ref 0.2–1)
VLDLC SERPL CALC-MCNC: 22.2 MG/DL
WBC # BLD AUTO: 5.7 K/UL (ref 4.6–13.2)
WBC URNS QL MICRO: ABNORMAL /HPF (ref 0–5)

## 2024-10-24 PROCEDURE — 84443 ASSAY THYROID STIM HORMONE: CPT

## 2024-10-24 PROCEDURE — 80061 LIPID PANEL: CPT

## 2024-10-24 PROCEDURE — 83036 HEMOGLOBIN GLYCOSYLATED A1C: CPT

## 2024-10-24 PROCEDURE — 82043 UR ALBUMIN QUANTITATIVE: CPT

## 2024-10-24 PROCEDURE — 85025 COMPLETE CBC W/AUTO DIFF WBC: CPT

## 2024-10-24 PROCEDURE — 36415 COLL VENOUS BLD VENIPUNCTURE: CPT

## 2024-10-24 PROCEDURE — G0103 PSA SCREENING: HCPCS

## 2024-10-24 PROCEDURE — 81001 URINALYSIS AUTO W/SCOPE: CPT

## 2024-10-24 PROCEDURE — 80053 COMPREHEN METABOLIC PANEL: CPT

## 2024-10-24 PROCEDURE — 82570 ASSAY OF URINE CREATININE: CPT

## 2024-10-24 PROCEDURE — 82306 VITAMIN D 25 HYDROXY: CPT

## 2024-11-01 NOTE — PROGRESS NOTES
HISTORY OF PRESENT ILLNESS  Jose J Gillespie  is a 59 y.o. y.o. male    He presents for health assessment, preventative care and follow-up with a history of type 2 diabetes, hypertension,  hyperlipidemia, vitamin D deficiency, adenomatous polyp of the colon and a family history of carcinoma the colon.  History also includes lumbar disc disease with foraminal stenosis previously followed by interventional pain management.        Mr#: [id number]      Past Medical History:   Diagnosis Date    Diabetes (HCC)     Hypercholesterolemia     Hypertension 2006    Sleep apnea        Past Surgical History:   Procedure Laterality Date    COLONOSCOPY  2022    Tubular adenoma, 5-year follow-up, Dr. Thomas Hernandez    COLONOSCOPY N/A 3/31/2017    COLONOSCOPY w hot polypectomy performed by Miguel Angel [facility] MD at [facility]OSCOPY    HEMORRHOID SURGERY  2014       Family History   Problem Relation Age of Onset    Hypertension Neg Hx     Heart Disease Neg Hx     Cancer Paternal Grandfather         unknown type     Diabetes Maternal Grandfather     Cancer Maternal Grandfather         colon    Diabetes Maternal Uncle     Cancer Sister         rectal    Cancer Maternal Uncle         colon       Allergies   Allergen Reactions    Lisinopril Cough    Penicillins Rash and Hives       Social History     Tobacco Use   Smoking Status Former    Current packs/day: 0.00    Average packs/day: 0.5 packs/day for 30.0 years (15.0 ttl pk-yrs)    Types: Cigarettes    Start date: 3/17/1985    Quit date: 3/17/2015    Years since quittin.6   Smokeless Tobacco Never       Social History     Substance and Sexual Activity   Alcohol Use Yes       Immunization History   Administered Date(s) Administered    COVID-19, PFIZER PURPLE top, DILUTE for use, (age 12 y+), 30mcg/0.3mL 2021, 04/15/2021    Hep B, HEPLISAV-B, (age 18y+), IM, 0.5mL 2024    Influenza, FLUARIX, FLULAVAL, FLUZONE (age 6 mo+) and AFLURIA, (age 3 y+), Quadv PF, 0.5mL 2017,

## 2024-11-04 NOTE — TELEPHONE ENCOUNTER
Last refilled 1/26/24 for 90 with 1 refill. Last refilled.last ov 4/9/24   Future Appointments   Date Time Provider Department Center   11/6/2024  8:00 AM Tristan Osman MD [facility] DEP

## 2024-11-06 ENCOUNTER — OFFICE VISIT (OUTPATIENT)
Dept: FAMILY MEDICINE CLINIC | Facility: CLINIC | Age: 59
End: 2024-11-06
Payer: COMMERCIAL

## 2024-11-06 VITALS
SYSTOLIC BLOOD PRESSURE: 134 MMHG | OXYGEN SATURATION: 97 % | HEIGHT: 68 IN | DIASTOLIC BLOOD PRESSURE: 84 MMHG | RESPIRATION RATE: 16 BRPM | WEIGHT: 195 LBS | HEART RATE: 98 BPM | TEMPERATURE: 97.2 F | BODY MASS INDEX: 29.55 KG/M2

## 2024-11-06 DIAGNOSIS — E78.2 MIXED HYPERLIPIDEMIA: ICD-10-CM

## 2024-11-06 DIAGNOSIS — K64.4 EXTERNAL HEMORRHOIDS: ICD-10-CM

## 2024-11-06 DIAGNOSIS — M48.061 LUMBAR FORAMINAL STENOSIS: ICD-10-CM

## 2024-11-06 DIAGNOSIS — K62.5 ANAL BLEEDING: ICD-10-CM

## 2024-11-06 DIAGNOSIS — E11.9 TYPE 2 DIABETES MELLITUS WITHOUT COMPLICATION, WITHOUT LONG-TERM CURRENT USE OF INSULIN (HCC): ICD-10-CM

## 2024-11-06 DIAGNOSIS — E55.9 VITAMIN D DEFICIENCY: ICD-10-CM

## 2024-11-06 DIAGNOSIS — Z86.0101 HX OF ADENOMATOUS POLYP OF COLON: ICD-10-CM

## 2024-11-06 DIAGNOSIS — Z00.00 ROUTINE GENERAL MEDICAL EXAMINATION AT A HEALTH CARE FACILITY: Primary | ICD-10-CM

## 2024-11-06 PROCEDURE — 90739 HEPB VACC 2/4 DOSE ADULT IM: CPT | Performed by: FAMILY MEDICINE

## 2024-11-06 PROCEDURE — 90661 CCIIV3 VAC ABX FR 0.5 ML IM: CPT | Performed by: FAMILY MEDICINE

## 2024-11-06 PROCEDURE — 90472 IMMUNIZATION ADMIN EACH ADD: CPT | Performed by: FAMILY MEDICINE

## 2024-11-06 PROCEDURE — 90471 IMMUNIZATION ADMIN: CPT | Performed by: FAMILY MEDICINE

## 2024-11-06 PROCEDURE — 3075F SYST BP GE 130 - 139MM HG: CPT | Performed by: FAMILY MEDICINE

## 2024-11-06 PROCEDURE — 99396 PREV VISIT EST AGE 40-64: CPT | Performed by: FAMILY MEDICINE

## 2024-11-06 PROCEDURE — 99213 OFFICE O/P EST LOW 20 MIN: CPT | Performed by: FAMILY MEDICINE

## 2024-11-06 PROCEDURE — 3079F DIAST BP 80-89 MM HG: CPT | Performed by: FAMILY MEDICINE

## 2024-11-06 RX ORDER — EZETIMIBE 10 MG/1
10 TABLET ORAL DAILY
Qty: 90 TABLET | Refills: 3 | Status: SHIPPED | OUTPATIENT
Start: 2024-11-06

## 2024-11-06 SDOH — ECONOMIC STABILITY: INCOME INSECURITY: HOW HARD IS IT FOR YOU TO PAY FOR THE VERY BASICS LIKE FOOD, HOUSING, MEDICAL CARE, AND HEATING?: NOT HARD AT ALL

## 2024-11-06 SDOH — ECONOMIC STABILITY: FOOD INSECURITY: WITHIN THE PAST 12 MONTHS, YOU WORRIED THAT YOUR FOOD WOULD RUN OUT BEFORE YOU GOT MONEY TO BUY MORE.: NEVER TRUE

## 2024-11-06 SDOH — ECONOMIC STABILITY: FOOD INSECURITY: WITHIN THE PAST 12 MONTHS, THE FOOD YOU BOUGHT JUST DIDN'T LAST AND YOU DIDN'T HAVE MONEY TO GET MORE.: NEVER TRUE

## 2024-11-06 ASSESSMENT — ENCOUNTER SYMPTOMS: BLOOD IN STOOL: 1

## 2024-11-06 NOTE — PATIENT INSTRUCTIONS
Current Status:  Type 2 diabetes well-controlled with a hemoglobin A1c of 6.8%  Suboptimal lipid levels with LDL cholesterol now 96.8 with goal level of less than 70 up from 65 1 year ago  Satisfactory vitamin D level  Increase risk of carcinoma of the colon secondary to history of tubular adenomas and family history-colonoscopy surveillance up-to-date  Symptoms associated with lumbar foraminal stenosis improved, stable  Infrequent BRB, annual-external hemorrhoids on exam, nonthrombosed no current bleeding    Health Maintenance Recommendations:  Influenza immunization given today  Hepatitis B immunization #2 given today  COVID-19 immunization booster-available at the pharmacy  Diabetic eye exam recommended at least every 2 years  Colonoscopy due April 2027    Plan:  OTC hemorrhoid cream when symptoms flare, frequent warm water sitz bath  Continue metformin 500 mg daily, losartan hydrochlorothiazide 100-12.5 mg daily atorvastatin 80 mg daily and vitamin D2 50,000 international units weekly  Begin ezetimibe 10 mg daily to improve control of cholesterol levels.  Return for fasting lab appointment followed by an office evaluation appointment in 6 months or sooner with any problems  Please always arrive at least 15 minutes before your scheduled appointment time.

## 2024-11-06 NOTE — PROGRESS NOTES
Jose J Gillespie is a 59 y.o. male (: 1965) presenting to address:    Chief Complaint   Patient presents with    Annual Exam    Immunizations     Would like flu shot and 2nd Hep B        Vitals:    24 0804   BP: 134/84   Pulse: 98   Resp: 16   Temp: 97.2 °F (36.2 °C)   SpO2: 97%       \"Have you been to the ER, urgent care clinic since your last visit?  Hospitalized since your last visit?\"    NO    “Have you seen or consulted any other health care providers outside of [facility] since your last visit?”    NO    Flu shot, Hep B # 2 Immunization/s administered 2024 by Betty Gotti LPN   Patient tolerated procedure well.  No reactions noted.

## 2025-01-31 RX ORDER — LOSARTAN POTASSIUM AND HYDROCHLOROTHIAZIDE 12.5; 1 MG/1; MG/1
1 TABLET ORAL DAILY
Qty: 90 TABLET | Refills: 3 | Status: SHIPPED | OUTPATIENT
Start: 2025-01-31

## 2025-01-31 NOTE — TELEPHONE ENCOUNTER
Last refilled 1/26/24 for 90 with 3 refills. Last ov 11/6/24   Future Appointments   Date Time Provider Department Center   4/29/2025  7:30 AM LAB_[facility] DEP   5/6/2025  8:00 AM [facility]

## 2025-02-03 ENCOUNTER — OFFICE VISIT (OUTPATIENT)
Dept: FAMILY MEDICINE CLINIC | Facility: CLINIC | Age: 60
End: 2025-02-03
Payer: COMMERCIAL

## 2025-02-03 VITALS
DIASTOLIC BLOOD PRESSURE: 88 MMHG | OXYGEN SATURATION: 98 % | BODY MASS INDEX: 29.1 KG/M2 | HEART RATE: 90 BPM | SYSTOLIC BLOOD PRESSURE: 130 MMHG | WEIGHT: 192 LBS | HEIGHT: 68 IN | RESPIRATION RATE: 16 BRPM | TEMPERATURE: 97.8 F

## 2025-02-03 DIAGNOSIS — R59.0 REACTIVE CERVICAL LYMPHADENOPATHY: Primary | ICD-10-CM

## 2025-02-03 PROCEDURE — 3079F DIAST BP 80-89 MM HG: CPT | Performed by: FAMILY MEDICINE

## 2025-02-03 PROCEDURE — 3075F SYST BP GE 130 - 139MM HG: CPT | Performed by: FAMILY MEDICINE

## 2025-02-03 PROCEDURE — 99213 OFFICE O/P EST LOW 20 MIN: CPT | Performed by: FAMILY MEDICINE

## 2025-02-03 RX ORDER — DOXYCYCLINE HYCLATE 100 MG
TABLET ORAL
Qty: 20 TABLET | Refills: 0 | Status: SHIPPED | OUTPATIENT
Start: 2025-02-03

## 2025-02-03 RX ORDER — ERYTHROMYCIN 5 MG/G
OINTMENT OPHTHALMIC
COMMUNITY
Start: 2025-01-29

## 2025-02-03 SDOH — ECONOMIC STABILITY: FOOD INSECURITY: WITHIN THE PAST 12 MONTHS, YOU WORRIED THAT YOUR FOOD WOULD RUN OUT BEFORE YOU GOT MONEY TO BUY MORE.: NEVER TRUE

## 2025-02-03 SDOH — ECONOMIC STABILITY: FOOD INSECURITY: WITHIN THE PAST 12 MONTHS, THE FOOD YOU BOUGHT JUST DIDN'T LAST AND YOU DIDN'T HAVE MONEY TO GET MORE.: NEVER TRUE

## 2025-02-03 ASSESSMENT — ENCOUNTER SYMPTOMS
SORE THROAT: 0
TROUBLE SWALLOWING: 0
COUGH: 0
SHORTNESS OF BREATH: 0
FACIAL SWELLING: 1

## 2025-02-03 ASSESSMENT — PATIENT HEALTH QUESTIONNAIRE - PHQ9
SUM OF ALL RESPONSES TO PHQ QUESTIONS 1-9: 0
2. FEELING DOWN, DEPRESSED OR HOPELESS: NOT AT ALL
1. LITTLE INTEREST OR PLEASURE IN DOING THINGS: NOT AT ALL
SUM OF ALL RESPONSES TO PHQ QUESTIONS 1-9: 0
SUM OF ALL RESPONSES TO PHQ9 QUESTIONS 1 & 2: 0

## 2025-02-03 NOTE — PROGRESS NOTES
Jose J Gillespie is a 59 y.o. male (: 1965) presenting to address:    Chief Complaint   Patient presents with    Facial Swelling     Patient c/o of left side facial swelling and tenderness. Started Saturday has improved since then .        Vitals:    25 1507   BP: 130/88   Pulse: 90   Temp: 97.8 °F (36.6 °C)   SpO2: 98%       \"Have you been to the ER, urgent care clinic since your last visit?  Hospitalized since your last visit?\"    Yes     “Have you seen or consulted any other health care providers outside of [facility] since your last visit?”    NO

## 2025-02-03 NOTE — PATIENT INSTRUCTIONS
Current Status:  Apparent blepharitis left lower lid with reactive left anterior cervical lymphadenopathy and adjacent facial swelling    Health Maintenance Recommendations:  COVID-19 immunization booster available at the pharmacy  Diabetic eye exam recommended at least every 2 years  Colonoscopy due April 2027    Plan:  Begin doxycycline 100 mg twice daily with food x 10 days, set up for 30-40 minutes after each dose  Apply warm wet compresses to the swollen area at the angle of the jaw and around the left eye.  Keep the head elevated is much as possible  Report new symptoms, worsening symptoms or failure to improve  Please schedule a lab appointment prior to the office appointment already scheduled on 5/6/2025.  Return sooner with any problems  Please always arrive at least 15 minutes before your scheduled appointment time.

## 2025-02-03 NOTE — PROGRESS NOTES
HISTORY OF PRESENT ILLNESS  Jose J Gillespie  is a 59 y.o. y.o. male    He reports tenderness and swelling involving the left side of the face beginning 2 days ago and starting to improve        Mr#: [id number]      Past Medical History:   Diagnosis Date    Diabetes (HCC)     Hypercholesterolemia     Hypertension     Sleep apnea        Past Surgical History:   Procedure Laterality Date    COLONOSCOPY  2022    Tubular adenoma, 5-year follow-up, Dr. Thomas Hernandez    COLONOSCOPY N/A 3/31/2017    COLONOSCOPY w hot polypectomy performed by Miguel Angel [facility] MD at [facility] ENDOSCOPY    HEMORRHOID SURGERY  2014    HERNIA REPAIR  2022       Family History   Problem Relation Age of Onset    Cancer Paternal Grandfather         unknown type     Diabetes Maternal Grandfather     Cancer Maternal Grandfather         colon    Diabetes Maternal Uncle     Cancer Maternal Uncle         colon    Cancer Sister         rectal    Hypertension Neg Hx     Heart Disease Neg Hx        Allergies   Allergen Reactions    Lisinopril Cough    Penicillins Rash and Hives       Social History     Tobacco Use   Smoking Status Former    Current packs/day: 0.00    Average packs/day: 0.5 packs/day for 30.0 years (15.0 ttl pk-yrs)    Types: Cigarettes    Start date: 3/17/1985    Quit date: 3/17/2015    Years since quittin.8   Smokeless Tobacco Never       Social History     Substance and Sexual Activity   Alcohol Use Yes       Immunization History   Administered Date(s) Administered    COVID-19, PFIZER PURPLE top, DILUTE for use, (age 12 y+), 30mcg/0.3mL 2021, 04/15/2021    Hep B, HEPLISAV-B, (age 18y+), IM, 0.5mL 2024, 2024    Influenza, FLUARIX, FLULAVAL, FLUZONE (age 6 mo+) and AFLURIA, (age 3 y+), Quadv PF, 0.5mL 2017, 2018, 10/06/2021, 2022    Influenza, FLUCELVAX, (age 6 mo+) IM, Trivalent PF, 0.5mL 2024    Influenza, FLUCELVAX, (age 6 mo+), MDCK, Quadv PF, 0.5mL 10/09/2023    Pneumococcal,

## 2025-04-25 RX ORDER — ATORVASTATIN CALCIUM 80 MG/1
80 TABLET, FILM COATED ORAL DAILY
Qty: 100 TABLET | Refills: 3 | Status: SHIPPED | OUTPATIENT
Start: 2025-04-25

## 2025-04-27 DIAGNOSIS — E11.9 TYPE 2 DIABETES MELLITUS WITHOUT COMPLICATION, WITHOUT LONG-TERM CURRENT USE OF INSULIN (HCC): Primary | ICD-10-CM

## 2025-04-27 DIAGNOSIS — E78.2 MIXED HYPERLIPIDEMIA: ICD-10-CM

## 2025-04-29 ENCOUNTER — LAB (OUTPATIENT)
Dept: FAMILY MEDICINE CLINIC | Facility: CLINIC | Age: 60
End: 2025-04-29

## 2025-04-29 ENCOUNTER — HOSPITAL ENCOUNTER (OUTPATIENT)
Facility: HOSPITAL | Age: 60
Setting detail: SPECIMEN
Discharge: HOME | End: 2025-05-02
Payer: COMMERCIAL

## 2025-04-29 DIAGNOSIS — E11.9 TYPE 2 DIABETES MELLITUS WITHOUT COMPLICATION, WITHOUT LONG-TERM CURRENT USE OF INSULIN (HCC): ICD-10-CM

## 2025-04-29 DIAGNOSIS — E78.2 MIXED HYPERLIPIDEMIA: ICD-10-CM

## 2025-04-29 LAB
CHOLEST SERPL-MCNC: 111 MG/DL
EST. AVERAGE GLUCOSE BLD GHB EST-MCNC: 140 MG/DL
HBA1C MFR BLD: 6.5 % (ref 4.2–5.6)
HDLC SERPL-MCNC: 38 MG/DL (ref 40–60)
HDLC SERPL: 2.9 (ref 0–5)
LDLC SERPL CALC-MCNC: 55 MG/DL (ref 0–100)
TRIGL SERPL-MCNC: 90 MG/DL (ref 0–150)
VLDLC SERPL CALC-MCNC: 18 MG/DL

## 2025-04-29 PROCEDURE — 83036 HEMOGLOBIN GLYCOSYLATED A1C: CPT

## 2025-04-29 PROCEDURE — 36415 COLL VENOUS BLD VENIPUNCTURE: CPT

## 2025-04-29 PROCEDURE — 80061 LIPID PANEL: CPT

## 2025-05-02 NOTE — PROGRESS NOTES
HISTORY OF PRESENT ILLNESS  Jose J Gillespie  is a 59 y.o. y.o. male    He presents for 6-month interval follow-up with a history of type 2 diabetes, hypertension,  hyperlipidemia, vitamin D deficiency, adenomatous polyp of the colon and a family history of carcinoma the colon.      He was seen 3 months ago with blepharitis involving the left lower lid and apparent reactive left anterior cervical adenopathy with adjacent swelling.  He was treated with a 10-day course of doxycycline.          Mr#: [id number]      Past Medical History:   Diagnosis Date    Diabetes (HCC) 2006    Hypercholesterolemia     Hypertension 2006    Sleep apnea        Past Surgical History:   Procedure Laterality Date    COLONOSCOPY  04/26/2022    Tubular adenoma, 5-year follow-up, Dr. Thomas Hernandez    COLONOSCOPY N/A 3/31/2017    COLONOSCOPY w hot polypectomy performed by Miguel Angel Bradley MD at [facility]    HEMORRHOID SURGERY  2/2014    HERNIA REPAIR  7-1-2022       Family History   Problem Relation Age of Onset    Cancer Paternal Grandfather         unknown type     Diabetes Maternal Grandfather     Cancer Maternal Grandfather         colon    Diabetes Maternal Uncle     Cancer Maternal Uncle         colon    Cancer Sister         rectal    Hypertension Neg Hx     Heart Disease Neg Hx        Allergies   Allergen Reactions    Lisinopril Cough    Penicillins Rash and Hives       Social History     Tobacco Use   Smoking Status Former    Current packs/day: 0.00    Average packs/day: 0.5 packs/day for 30.0 years (15.0 ttl pk-yrs)    Types: Cigarettes    Start date: 3/17/1985    Quit date: 3/17/2015    Years since quitting: 10.1   Smokeless Tobacco Never       Social History     Substance and Sexual Activity   Alcohol Use Yes       Immunization History   Administered Date(s) Administered    COVID-19, PFIZER PURPLE top, DILUTE for use, (age 12 y+), 30mcg/0.3mL 03/25/2021, 04/15/2021    Hep B, HEPLISAV-B, (age 18y+), IM, 0.5mL 04/09/2024, 11/06/2024

## 2025-05-06 ENCOUNTER — OFFICE VISIT (OUTPATIENT)
Dept: FAMILY MEDICINE CLINIC | Facility: CLINIC | Age: 60
End: 2025-05-06
Payer: COMMERCIAL

## 2025-05-06 VITALS
DIASTOLIC BLOOD PRESSURE: 86 MMHG | BODY MASS INDEX: 29.25 KG/M2 | RESPIRATION RATE: 16 BRPM | OXYGEN SATURATION: 97 % | TEMPERATURE: 97.5 F | HEART RATE: 86 BPM | WEIGHT: 193 LBS | HEIGHT: 68 IN | SYSTOLIC BLOOD PRESSURE: 128 MMHG

## 2025-05-06 DIAGNOSIS — E11.9 TYPE 2 DIABETES MELLITUS WITHOUT COMPLICATION, WITHOUT LONG-TERM CURRENT USE OF INSULIN (HCC): Primary | ICD-10-CM

## 2025-05-06 DIAGNOSIS — I10 PRIMARY HYPERTENSION: ICD-10-CM

## 2025-05-06 DIAGNOSIS — Z86.0101 HX OF ADENOMATOUS POLYP OF COLON: ICD-10-CM

## 2025-05-06 DIAGNOSIS — E55.9 VITAMIN D DEFICIENCY: ICD-10-CM

## 2025-05-06 DIAGNOSIS — E78.2 MIXED HYPERLIPIDEMIA: ICD-10-CM

## 2025-05-06 PROCEDURE — 3044F HG A1C LEVEL LT 7.0%: CPT | Performed by: FAMILY MEDICINE

## 2025-05-06 PROCEDURE — 3079F DIAST BP 80-89 MM HG: CPT | Performed by: FAMILY MEDICINE

## 2025-05-06 PROCEDURE — 3074F SYST BP LT 130 MM HG: CPT | Performed by: FAMILY MEDICINE

## 2025-05-06 PROCEDURE — 99213 OFFICE O/P EST LOW 20 MIN: CPT | Performed by: FAMILY MEDICINE

## 2025-05-06 NOTE — PROGRESS NOTES
Jose J Gillespie is a 59 y.o. male (: 1965) presenting to address:    Chief Complaint   Patient presents with    Diabetes    Hypertension    Hyperlipidemia     Has only been taking zetia and not atorvastatin.        Vitals:    25 0809   BP: 128/86   Pulse:    Resp:    Temp:    SpO2:        \"Have you been to the ER, urgent care clinic since your last visit?  Hospitalized since your last visit?\"    NO    “Have you seen or consulted any other health care providers outside of [facility] since your last visit?”    NO

## 2025-05-06 NOTE — PATIENT INSTRUCTIONS
Current Status:  Type 2 diabetes well-controlled with a hemoglobin A1c of 6.5% down from 6.8% 6 months ago  Hypertension adequately controlled  Mixed hyperlipidemia with lipid levels at goal as of 4/29/2025-not taking atorvastatin secondary to misunderstanding  Satisfactory vitamin D level as of 10/24/2024  History of colonic tubular adenomas with colonoscopy surveillance currently up-to-date  Blepharitis resolved  Left cervical adenopathy resolved    Health Maintenance Recommendations:   Colonoscopy due April 2027    Plan:  Continue all current medications including atorvastatin  Avoid dietary salt, starch and sugar and as much as possible follow program of regular aerobic exercise.  Please schedule lab appointment followed by an annual physical exam appointment after 11/6/2025, return sooner with any problems

## 2025-05-12 NOTE — TELEPHONE ENCOUNTER
Last refilled 10/30/23 for 100 with 3 refills.last ov 2/3/25   Future Appointments   Date Time Provider Department Center   4/29/2025  7:30 AM LAB[facility] DEP   5/6/2025  8:00 AM [facility]       
No